# Patient Record
Sex: MALE | Race: WHITE | NOT HISPANIC OR LATINO | URBAN - METROPOLITAN AREA
[De-identification: names, ages, dates, MRNs, and addresses within clinical notes are randomized per-mention and may not be internally consistent; named-entity substitution may affect disease eponyms.]

---

## 2017-03-03 ENCOUNTER — OFFICE VISIT - HEALTHEAST (OUTPATIENT)
Dept: FAMILY MEDICINE | Facility: CLINIC | Age: 62
End: 2017-03-03

## 2017-03-03 ENCOUNTER — COMMUNICATION - HEALTHEAST (OUTPATIENT)
Dept: FAMILY MEDICINE | Facility: CLINIC | Age: 62
End: 2017-03-03

## 2017-03-03 DIAGNOSIS — I10 ESSENTIAL HYPERTENSION WITH GOAL BLOOD PRESSURE LESS THAN 130/80: ICD-10-CM

## 2017-03-03 DIAGNOSIS — L91.8 SKIN TAG: ICD-10-CM

## 2017-03-03 DIAGNOSIS — E78.5 HYPERLIPIDEMIA, UNSPECIFIED HYPERLIPIDEMIA TYPE: ICD-10-CM

## 2017-03-03 DIAGNOSIS — E11.9 TYPE 2 DIABETES MELLITUS (H): ICD-10-CM

## 2017-03-03 LAB
CHOLEST SERPL-MCNC: 128 MG/DL
FASTING STATUS PATIENT QL REPORTED: ABNORMAL
HBA1C MFR BLD: 7.7 % (ref 3.5–6)
HDLC SERPL-MCNC: 34 MG/DL
LDLC SERPL CALC-MCNC: 66 MG/DL
TRIGL SERPL-MCNC: 138 MG/DL

## 2017-03-11 ENCOUNTER — RECORDS - HEALTHEAST (OUTPATIENT)
Dept: ADMINISTRATIVE | Facility: OTHER | Age: 62
End: 2017-03-11

## 2017-04-09 ENCOUNTER — COMMUNICATION - HEALTHEAST (OUTPATIENT)
Dept: FAMILY MEDICINE | Facility: CLINIC | Age: 62
End: 2017-04-09

## 2017-04-09 DIAGNOSIS — I10 ESSENTIAL HYPERTENSION: ICD-10-CM

## 2017-04-09 DIAGNOSIS — E78.5 HYPERLIPIDEMIA: ICD-10-CM

## 2017-07-03 ENCOUNTER — OFFICE VISIT - HEALTHEAST (OUTPATIENT)
Dept: FAMILY MEDICINE | Facility: CLINIC | Age: 62
End: 2017-07-03

## 2017-07-03 DIAGNOSIS — H91.90 DECREASED HEARING: ICD-10-CM

## 2017-07-03 DIAGNOSIS — Z02.89 ENCOUNTER FOR EXAMINATION REQUIRED BY DEPARTMENT OF TRANSPORTATION (DOT): ICD-10-CM

## 2017-07-03 ASSESSMENT — MIFFLIN-ST. JEOR: SCORE: 1750.99

## 2017-07-05 ENCOUNTER — COMMUNICATION - HEALTHEAST (OUTPATIENT)
Dept: FAMILY MEDICINE | Facility: CLINIC | Age: 62
End: 2017-07-05

## 2017-07-05 ENCOUNTER — OFFICE VISIT - HEALTHEAST (OUTPATIENT)
Dept: FAMILY MEDICINE | Facility: CLINIC | Age: 62
End: 2017-07-05

## 2017-07-05 DIAGNOSIS — I10 ESSENTIAL HYPERTENSION WITH GOAL BLOOD PRESSURE LESS THAN 130/80: ICD-10-CM

## 2017-07-05 DIAGNOSIS — E11.9 TYPE 2 DIABETES MELLITUS (H): ICD-10-CM

## 2017-07-05 DIAGNOSIS — E78.5 HYPERLIPIDEMIA, UNSPECIFIED HYPERLIPIDEMIA TYPE: ICD-10-CM

## 2017-07-05 DIAGNOSIS — Z00.00 ROUTINE GENERAL MEDICAL EXAMINATION AT A HEALTH CARE FACILITY: ICD-10-CM

## 2017-07-05 DIAGNOSIS — D12.6 TUBULAR ADENOMA OF COLON: ICD-10-CM

## 2017-07-05 DIAGNOSIS — E11.319 DIABETIC RETINOPATHY (H): ICD-10-CM

## 2017-07-05 DIAGNOSIS — E66.09 NON MORBID OBESITY DUE TO EXCESS CALORIES: ICD-10-CM

## 2017-07-05 LAB — HBA1C MFR BLD: 7.3 % (ref 3.5–6)

## 2017-07-05 ASSESSMENT — MIFFLIN-ST. JEOR: SCORE: 1752.41

## 2017-10-04 ENCOUNTER — OFFICE VISIT - HEALTHEAST (OUTPATIENT)
Dept: AUDIOLOGY | Facility: CLINIC | Age: 62
End: 2017-10-04

## 2017-10-04 DIAGNOSIS — H93.11 TINNITUS OF RIGHT EAR: ICD-10-CM

## 2017-10-04 DIAGNOSIS — H90.3 SENSORINEURAL HEARING LOSS, ASYMMETRICAL: ICD-10-CM

## 2017-11-07 ENCOUNTER — OFFICE VISIT - HEALTHEAST (OUTPATIENT)
Dept: FAMILY MEDICINE | Facility: CLINIC | Age: 62
End: 2017-11-07

## 2017-11-07 ENCOUNTER — COMMUNICATION - HEALTHEAST (OUTPATIENT)
Dept: FAMILY MEDICINE | Facility: CLINIC | Age: 62
End: 2017-11-07

## 2017-11-07 DIAGNOSIS — H26.9 CATARACTS, BILATERAL: ICD-10-CM

## 2017-11-07 DIAGNOSIS — E11.9 TYPE 2 DIABETES MELLITUS (H): ICD-10-CM

## 2017-11-07 DIAGNOSIS — I10 ESSENTIAL HYPERTENSION: ICD-10-CM

## 2017-11-07 DIAGNOSIS — E11.36 TYPE 2 DIABETES MELLITUS WITH DIABETIC CATARACT, WITHOUT LONG-TERM CURRENT USE OF INSULIN (H): ICD-10-CM

## 2017-11-07 DIAGNOSIS — E80.6 HYPERBILIRUBINEMIA: ICD-10-CM

## 2017-11-07 DIAGNOSIS — E78.5 HYPERLIPIDEMIA, UNSPECIFIED HYPERLIPIDEMIA TYPE: ICD-10-CM

## 2017-11-07 LAB
CHOLEST SERPL-MCNC: 143 MG/DL
FASTING STATUS PATIENT QL REPORTED: ABNORMAL
HBA1C MFR BLD: 7.5 % (ref 3.5–6)
HDLC SERPL-MCNC: 36 MG/DL
LDLC SERPL CALC-MCNC: 81 MG/DL
TRIGL SERPL-MCNC: 132 MG/DL

## 2017-12-01 ENCOUNTER — COMMUNICATION - HEALTHEAST (OUTPATIENT)
Dept: FAMILY MEDICINE | Facility: CLINIC | Age: 62
End: 2017-12-01

## 2017-12-01 DIAGNOSIS — E11.9 TYPE 2 DIABETES MELLITUS (H): ICD-10-CM

## 2017-12-06 ENCOUNTER — COMMUNICATION - HEALTHEAST (OUTPATIENT)
Dept: UROLOGY | Facility: CLINIC | Age: 62
End: 2017-12-06

## 2017-12-11 ENCOUNTER — AMBULATORY - HEALTHEAST (OUTPATIENT)
Dept: LAB | Facility: HOSPITAL | Age: 62
End: 2017-12-11

## 2017-12-11 ENCOUNTER — COMMUNICATION - HEALTHEAST (OUTPATIENT)
Dept: UROLOGY | Facility: CLINIC | Age: 62
End: 2017-12-11

## 2017-12-11 DIAGNOSIS — N20.9 URINARY TRACT STONES: ICD-10-CM

## 2017-12-13 LAB
1ST CONSTITUENT:: NORMAL
SOURCE: NORMAL

## 2017-12-28 ENCOUNTER — COMMUNICATION - HEALTHEAST (OUTPATIENT)
Dept: UROLOGY | Facility: CLINIC | Age: 62
End: 2017-12-28

## 2018-03-09 ENCOUNTER — OFFICE VISIT - HEALTHEAST (OUTPATIENT)
Dept: FAMILY MEDICINE | Facility: CLINIC | Age: 63
End: 2018-03-09

## 2018-03-09 DIAGNOSIS — I10 ESSENTIAL HYPERTENSION: ICD-10-CM

## 2018-03-09 DIAGNOSIS — E78.5 HYPERLIPIDEMIA, UNSPECIFIED HYPERLIPIDEMIA TYPE: ICD-10-CM

## 2018-03-09 DIAGNOSIS — D12.6 TUBULAR ADENOMA OF COLON: ICD-10-CM

## 2018-03-09 DIAGNOSIS — N20.0 LEFT NEPHROLITHIASIS: ICD-10-CM

## 2018-03-09 DIAGNOSIS — E11.319 DIABETIC RETINOPATHY OF BOTH EYES ASSOCIATED WITH TYPE 2 DIABETES MELLITUS, MACULAR EDEMA PRESENCE UNSPECIFIED, UNSPECIFIED RETINOPATHY SEVERITY (H): ICD-10-CM

## 2018-03-09 DIAGNOSIS — E11.9 TYPE 2 DIABETES MELLITUS (H): ICD-10-CM

## 2018-03-09 DIAGNOSIS — N18.30 CKD (CHRONIC KIDNEY DISEASE) STAGE 3, GFR 30-59 ML/MIN (H): ICD-10-CM

## 2018-03-09 LAB
ANION GAP SERPL CALCULATED.3IONS-SCNC: 9 MMOL/L (ref 5–18)
BUN SERPL-MCNC: 16 MG/DL (ref 8–22)
CALCIUM SERPL-MCNC: 9.6 MG/DL (ref 8.5–10.5)
CHLORIDE BLD-SCNC: 102 MMOL/L (ref 98–107)
CO2 SERPL-SCNC: 30 MMOL/L (ref 22–31)
CREAT SERPL-MCNC: 1.17 MG/DL (ref 0.7–1.3)
CREAT UR-MCNC: 253.8 MG/DL
GFR SERPL CREATININE-BSD FRML MDRD: >60 ML/MIN/1.73M2
GLUCOSE BLD-MCNC: 108 MG/DL (ref 70–125)
HBA1C MFR BLD: 7.8 % (ref 3.5–6)
MICROALBUMIN UR-MCNC: 1.96 MG/DL (ref 0–1.99)
MICROALBUMIN/CREAT UR: 7.7 MG/G
POTASSIUM BLD-SCNC: 4.5 MMOL/L (ref 3.5–5)
SODIUM SERPL-SCNC: 141 MMOL/L (ref 136–145)

## 2018-03-10 ENCOUNTER — COMMUNICATION - HEALTHEAST (OUTPATIENT)
Dept: FAMILY MEDICINE | Facility: CLINIC | Age: 63
End: 2018-03-10

## 2018-03-24 ENCOUNTER — RECORDS - HEALTHEAST (OUTPATIENT)
Dept: ADMINISTRATIVE | Facility: OTHER | Age: 63
End: 2018-03-24

## 2018-04-29 ENCOUNTER — COMMUNICATION - HEALTHEAST (OUTPATIENT)
Dept: FAMILY MEDICINE | Facility: CLINIC | Age: 63
End: 2018-04-29

## 2018-04-29 DIAGNOSIS — E78.5 HYPERLIPIDEMIA: ICD-10-CM

## 2018-04-29 DIAGNOSIS — I10 ESSENTIAL HYPERTENSION: ICD-10-CM

## 2018-06-18 ENCOUNTER — OFFICE VISIT - HEALTHEAST (OUTPATIENT)
Dept: FAMILY MEDICINE | Facility: CLINIC | Age: 63
End: 2018-06-18

## 2018-06-18 DIAGNOSIS — Z02.89 ENCOUNTER FOR EXAMINATION REQUIRED BY DEPARTMENT OF TRANSPORTATION (DOT): ICD-10-CM

## 2018-06-18 DIAGNOSIS — E11.9 DIABETES MELLITUS, TYPE 2 (H): ICD-10-CM

## 2018-06-18 DIAGNOSIS — E78.5 HYPERLIPIDEMIA, UNSPECIFIED HYPERLIPIDEMIA TYPE: ICD-10-CM

## 2018-06-18 DIAGNOSIS — I10 ESSENTIAL HYPERTENSION: ICD-10-CM

## 2018-06-18 DIAGNOSIS — E11.319 DIABETIC RETINOPATHY OF BOTH EYES ASSOCIATED WITH TYPE 2 DIABETES MELLITUS, MACULAR EDEMA PRESENCE UNSPECIFIED, UNSPECIFIED RETINOPATHY SEVERITY (H): ICD-10-CM

## 2018-06-18 DIAGNOSIS — H90.3 ASYMMETRICAL SENSORINEURAL HEARING LOSS: ICD-10-CM

## 2018-06-18 LAB
ALBUMIN SERPL-MCNC: 3.9 G/DL (ref 3.5–5)
ALBUMIN UR-MCNC: ABNORMAL MG/DL
ALP SERPL-CCNC: 74 U/L (ref 45–120)
ALT SERPL W P-5'-P-CCNC: 35 U/L (ref 0–45)
ANION GAP SERPL CALCULATED.3IONS-SCNC: 12 MMOL/L (ref 5–18)
APPEARANCE UR: CLEAR
AST SERPL W P-5'-P-CCNC: 17 U/L (ref 0–40)
BACTERIA #/AREA URNS HPF: ABNORMAL HPF
BILIRUB SERPL-MCNC: 1.4 MG/DL (ref 0–1)
BILIRUB UR QL STRIP: NEGATIVE
BUN SERPL-MCNC: 23 MG/DL (ref 8–22)
CALCIUM SERPL-MCNC: 9.7 MG/DL (ref 8.5–10.5)
CHLORIDE BLD-SCNC: 103 MMOL/L (ref 98–107)
CHOLEST SERPL-MCNC: 125 MG/DL
CO2 SERPL-SCNC: 27 MMOL/L (ref 22–31)
COLOR UR AUTO: YELLOW
CREAT SERPL-MCNC: 1.37 MG/DL (ref 0.7–1.3)
FASTING STATUS PATIENT QL REPORTED: YES
GFR SERPL CREATININE-BSD FRML MDRD: 52 ML/MIN/1.73M2
GLUCOSE BLD-MCNC: 100 MG/DL (ref 70–125)
GLUCOSE UR STRIP-MCNC: NEGATIVE MG/DL
HBA1C MFR BLD: 7.5 % (ref 3.5–6)
HDLC SERPL-MCNC: 33 MG/DL
HGB UR QL STRIP: NEGATIVE
KETONES UR STRIP-MCNC: ABNORMAL MG/DL
LDLC SERPL CALC-MCNC: 67 MG/DL
LEUKOCYTE ESTERASE UR QL STRIP: NEGATIVE
NITRATE UR QL: NEGATIVE
PH UR STRIP: 5 [PH] (ref 5–8)
POTASSIUM BLD-SCNC: 4.7 MMOL/L (ref 3.5–5)
PROT SERPL-MCNC: 6.7 G/DL (ref 6–8)
RBC #/AREA URNS AUTO: ABNORMAL HPF
SODIUM SERPL-SCNC: 142 MMOL/L (ref 136–145)
SP GR UR STRIP: >=1.03 (ref 1–1.03)
SQUAMOUS #/AREA URNS AUTO: ABNORMAL LPF
TRIGL SERPL-MCNC: 124 MG/DL
UROBILINOGEN UR STRIP-ACNC: ABNORMAL
WBC #/AREA URNS AUTO: ABNORMAL HPF

## 2018-06-18 ASSESSMENT — MIFFLIN-ST. JEOR: SCORE: 1733.42

## 2018-06-20 ENCOUNTER — OFFICE VISIT - HEALTHEAST (OUTPATIENT)
Dept: FAMILY MEDICINE | Facility: CLINIC | Age: 63
End: 2018-06-20

## 2018-06-20 DIAGNOSIS — H90.3 ASYMMETRICAL SENSORINEURAL HEARING LOSS: ICD-10-CM

## 2018-06-20 DIAGNOSIS — E11.22 TYPE 2 DIABETES MELLITUS WITH STAGE 3 CHRONIC KIDNEY DISEASE, WITHOUT LONG-TERM CURRENT USE OF INSULIN (H): ICD-10-CM

## 2018-06-20 DIAGNOSIS — D12.6 TUBULAR ADENOMA OF COLON: ICD-10-CM

## 2018-06-20 DIAGNOSIS — I10 ESSENTIAL HYPERTENSION: ICD-10-CM

## 2018-06-20 DIAGNOSIS — N20.0 LEFT NEPHROLITHIASIS: ICD-10-CM

## 2018-06-20 DIAGNOSIS — N18.30 TYPE 2 DIABETES MELLITUS WITH STAGE 3 CHRONIC KIDNEY DISEASE, WITHOUT LONG-TERM CURRENT USE OF INSULIN (H): ICD-10-CM

## 2018-06-20 DIAGNOSIS — E78.5 HYPERLIPIDEMIA, UNSPECIFIED HYPERLIPIDEMIA TYPE: ICD-10-CM

## 2018-06-20 DIAGNOSIS — N18.30 CKD (CHRONIC KIDNEY DISEASE) STAGE 3, GFR 30-59 ML/MIN (H): ICD-10-CM

## 2018-08-10 ENCOUNTER — COMMUNICATION - HEALTHEAST (OUTPATIENT)
Dept: FAMILY MEDICINE | Facility: CLINIC | Age: 63
End: 2018-08-10

## 2018-08-10 DIAGNOSIS — E11.36 TYPE 2 DIABETES MELLITUS WITH DIABETIC CATARACT, WITHOUT LONG-TERM CURRENT USE OF INSULIN (H): ICD-10-CM

## 2018-08-15 ENCOUNTER — OFFICE VISIT - HEALTHEAST (OUTPATIENT)
Dept: OTOLARYNGOLOGY | Facility: CLINIC | Age: 63
End: 2018-08-15

## 2018-08-15 ENCOUNTER — OFFICE VISIT - HEALTHEAST (OUTPATIENT)
Dept: AUDIOLOGY | Facility: CLINIC | Age: 63
End: 2018-08-15

## 2018-08-15 DIAGNOSIS — H90.3 SENSORINEURAL HEARING LOSS, ASYMMETRICAL: ICD-10-CM

## 2018-08-15 DIAGNOSIS — H93.11 TINNITUS OF RIGHT EAR: ICD-10-CM

## 2018-08-15 DIAGNOSIS — H90.3 ASYMMETRICAL SENSORINEURAL HEARING LOSS: ICD-10-CM

## 2018-10-24 ENCOUNTER — OFFICE VISIT - HEALTHEAST (OUTPATIENT)
Dept: FAMILY MEDICINE | Facility: CLINIC | Age: 63
End: 2018-10-24

## 2018-10-24 ENCOUNTER — AMBULATORY - HEALTHEAST (OUTPATIENT)
Dept: FAMILY MEDICINE | Facility: CLINIC | Age: 63
End: 2018-10-24

## 2018-10-24 DIAGNOSIS — E66.811 CLASS 1 OBESITY DUE TO EXCESS CALORIES WITH SERIOUS COMORBIDITY AND BODY MASS INDEX (BMI) OF 33.0 TO 33.9 IN ADULT: ICD-10-CM

## 2018-10-24 DIAGNOSIS — E11.9 DIABETES MELLITUS, TYPE 2 (H): ICD-10-CM

## 2018-10-24 DIAGNOSIS — I10 ESSENTIAL HYPERTENSION: ICD-10-CM

## 2018-10-24 DIAGNOSIS — E66.09 CLASS 1 OBESITY DUE TO EXCESS CALORIES WITH SERIOUS COMORBIDITY AND BODY MASS INDEX (BMI) OF 33.0 TO 33.9 IN ADULT: ICD-10-CM

## 2018-10-24 DIAGNOSIS — L91.8 CUTANEOUS SKIN TAGS: ICD-10-CM

## 2018-10-24 DIAGNOSIS — R06.83 SNORING: ICD-10-CM

## 2018-10-24 DIAGNOSIS — Z00.00 ROUTINE GENERAL MEDICAL EXAMINATION AT A HEALTH CARE FACILITY: ICD-10-CM

## 2018-10-24 DIAGNOSIS — N18.30 CKD (CHRONIC KIDNEY DISEASE) STAGE 3, GFR 30-59 ML/MIN (H): ICD-10-CM

## 2018-10-24 DIAGNOSIS — E78.5 HYPERLIPIDEMIA, UNSPECIFIED HYPERLIPIDEMIA TYPE: ICD-10-CM

## 2018-10-24 DIAGNOSIS — D12.6 TUBULAR ADENOMA OF COLON: ICD-10-CM

## 2018-10-24 LAB
ALBUMIN SERPL-MCNC: 3.8 G/DL (ref 3.5–5)
ALP SERPL-CCNC: 73 U/L (ref 45–120)
ALT SERPL W P-5'-P-CCNC: 42 U/L (ref 0–45)
ANION GAP SERPL CALCULATED.3IONS-SCNC: 14 MMOL/L (ref 5–18)
AST SERPL W P-5'-P-CCNC: 21 U/L (ref 0–40)
BILIRUB SERPL-MCNC: 1.2 MG/DL (ref 0–1)
BUN SERPL-MCNC: 18 MG/DL (ref 8–22)
CALCIUM SERPL-MCNC: 10 MG/DL (ref 8.5–10.5)
CHLORIDE BLD-SCNC: 104 MMOL/L (ref 98–107)
CO2 SERPL-SCNC: 24 MMOL/L (ref 22–31)
CREAT SERPL-MCNC: 1.19 MG/DL (ref 0.7–1.3)
GFR SERPL CREATININE-BSD FRML MDRD: >60 ML/MIN/1.73M2
GLUCOSE BLD-MCNC: 134 MG/DL (ref 70–125)
HBA1C MFR BLD: 7.8 % (ref 3.5–6)
POTASSIUM BLD-SCNC: 4.9 MMOL/L (ref 3.5–5)
PROT SERPL-MCNC: 6.6 G/DL (ref 6–8)
PSA SERPL-MCNC: 2.4 NG/ML (ref 0–4.5)
SODIUM SERPL-SCNC: 142 MMOL/L (ref 136–145)

## 2018-10-24 ASSESSMENT — MIFFLIN-ST. JEOR: SCORE: 1744.47

## 2018-10-25 ENCOUNTER — AMBULATORY - HEALTHEAST (OUTPATIENT)
Dept: FAMILY MEDICINE | Facility: CLINIC | Age: 63
End: 2018-10-25

## 2018-10-25 ENCOUNTER — COMMUNICATION - HEALTHEAST (OUTPATIENT)
Dept: FAMILY MEDICINE | Facility: CLINIC | Age: 63
End: 2018-10-25

## 2018-10-25 DIAGNOSIS — E55.9 VITAMIN D DEFICIENCY: ICD-10-CM

## 2018-10-25 LAB — 25(OH)D3 SERPL-MCNC: 13.8 NG/ML (ref 30–80)

## 2019-02-26 ENCOUNTER — OFFICE VISIT - HEALTHEAST (OUTPATIENT)
Dept: FAMILY MEDICINE | Facility: CLINIC | Age: 64
End: 2019-02-26

## 2019-02-26 DIAGNOSIS — D12.6 TUBULAR ADENOMA OF COLON: ICD-10-CM

## 2019-02-26 DIAGNOSIS — E66.811 CLASS 1 OBESITY DUE TO EXCESS CALORIES WITH SERIOUS COMORBIDITY AND BODY MASS INDEX (BMI) OF 33.0 TO 33.9 IN ADULT: ICD-10-CM

## 2019-02-26 DIAGNOSIS — N18.30 CKD (CHRONIC KIDNEY DISEASE) STAGE 3, GFR 30-59 ML/MIN (H): ICD-10-CM

## 2019-02-26 DIAGNOSIS — E11.36 TYPE 2 DIABETES MELLITUS WITH DIABETIC CATARACT, WITHOUT LONG-TERM CURRENT USE OF INSULIN (H): ICD-10-CM

## 2019-02-26 DIAGNOSIS — E78.5 HYPERLIPIDEMIA, UNSPECIFIED HYPERLIPIDEMIA TYPE: ICD-10-CM

## 2019-02-26 DIAGNOSIS — E55.9 VITAMIN D DEFICIENCY: ICD-10-CM

## 2019-02-26 DIAGNOSIS — E66.09 CLASS 1 OBESITY DUE TO EXCESS CALORIES WITH SERIOUS COMORBIDITY AND BODY MASS INDEX (BMI) OF 33.0 TO 33.9 IN ADULT: ICD-10-CM

## 2019-02-26 DIAGNOSIS — I10 ESSENTIAL HYPERTENSION: ICD-10-CM

## 2019-02-26 LAB
ANION GAP SERPL CALCULATED.3IONS-SCNC: 13 MMOL/L (ref 5–18)
BUN SERPL-MCNC: 20 MG/DL (ref 8–22)
CALCIUM SERPL-MCNC: 9.6 MG/DL (ref 8.5–10.5)
CHLORIDE BLD-SCNC: 103 MMOL/L (ref 98–107)
CO2 SERPL-SCNC: 25 MMOL/L (ref 22–31)
CREAT SERPL-MCNC: 1.3 MG/DL (ref 0.7–1.3)
GFR SERPL CREATININE-BSD FRML MDRD: 56 ML/MIN/1.73M2
GLUCOSE BLD-MCNC: 197 MG/DL (ref 70–125)
HBA1C MFR BLD: 7.1 % (ref 3.5–6)
POTASSIUM BLD-SCNC: 4.5 MMOL/L (ref 3.5–5)
SODIUM SERPL-SCNC: 141 MMOL/L (ref 136–145)

## 2019-02-27 ENCOUNTER — COMMUNICATION - HEALTHEAST (OUTPATIENT)
Dept: FAMILY MEDICINE | Facility: CLINIC | Age: 64
End: 2019-02-27

## 2019-02-27 LAB — 25(OH)D3 SERPL-MCNC: 54.2 NG/ML (ref 30–80)

## 2019-04-06 ENCOUNTER — RECORDS - HEALTHEAST (OUTPATIENT)
Dept: ADMINISTRATIVE | Facility: OTHER | Age: 64
End: 2019-04-06

## 2019-04-17 ENCOUNTER — RECORDS - HEALTHEAST (OUTPATIENT)
Dept: HEALTH INFORMATION MANAGEMENT | Facility: CLINIC | Age: 64
End: 2019-04-17

## 2019-04-19 ENCOUNTER — COMMUNICATION - HEALTHEAST (OUTPATIENT)
Dept: FAMILY MEDICINE | Facility: CLINIC | Age: 64
End: 2019-04-19

## 2019-04-19 DIAGNOSIS — I10 ESSENTIAL HYPERTENSION: ICD-10-CM

## 2019-04-19 DIAGNOSIS — E78.5 HYPERLIPIDEMIA: ICD-10-CM

## 2019-06-14 ENCOUNTER — OFFICE VISIT - HEALTHEAST (OUTPATIENT)
Dept: FAMILY MEDICINE | Facility: CLINIC | Age: 64
End: 2019-06-14

## 2019-06-14 DIAGNOSIS — Z12.11 SCREEN FOR COLON CANCER: ICD-10-CM

## 2019-06-14 DIAGNOSIS — Z02.89 ENCOUNTER FOR EXAMINATION REQUIRED BY DEPARTMENT OF TRANSPORTATION (DOT): ICD-10-CM

## 2019-06-14 LAB
ALBUMIN UR-MCNC: ABNORMAL MG/DL
APPEARANCE UR: CLEAR
BACTERIA #/AREA URNS HPF: ABNORMAL HPF
BILIRUB UR QL STRIP: NEGATIVE
COLOR UR AUTO: YELLOW
GLUCOSE UR STRIP-MCNC: NEGATIVE MG/DL
HGB UR QL STRIP: ABNORMAL
KETONES UR STRIP-MCNC: NEGATIVE MG/DL
LEUKOCYTE ESTERASE UR QL STRIP: NEGATIVE
MUCOUS THREADS #/AREA URNS LPF: ABNORMAL LPF
NITRATE UR QL: NEGATIVE
PH UR STRIP: 5 [PH] (ref 5–8)
RBC #/AREA URNS AUTO: ABNORMAL HPF
SP GR UR STRIP: >=1.03 (ref 1–1.03)
SQUAMOUS #/AREA URNS AUTO: ABNORMAL LPF
UROBILINOGEN UR STRIP-ACNC: ABNORMAL
WBC #/AREA URNS AUTO: ABNORMAL HPF

## 2019-06-14 ASSESSMENT — MIFFLIN-ST. JEOR: SCORE: 1741.35

## 2019-07-10 ENCOUNTER — COMMUNICATION - HEALTHEAST (OUTPATIENT)
Dept: FAMILY MEDICINE | Facility: CLINIC | Age: 64
End: 2019-07-10

## 2019-07-10 ENCOUNTER — OFFICE VISIT - HEALTHEAST (OUTPATIENT)
Dept: FAMILY MEDICINE | Facility: CLINIC | Age: 64
End: 2019-07-10

## 2019-07-10 DIAGNOSIS — N18.30 CKD (CHRONIC KIDNEY DISEASE) STAGE 3, GFR 30-59 ML/MIN (H): ICD-10-CM

## 2019-07-10 DIAGNOSIS — E55.9 VITAMIN D DEFICIENCY: ICD-10-CM

## 2019-07-10 DIAGNOSIS — I10 ESSENTIAL HYPERTENSION: ICD-10-CM

## 2019-07-10 DIAGNOSIS — D12.6 TUBULAR ADENOMA OF COLON: ICD-10-CM

## 2019-07-10 DIAGNOSIS — E78.5 HYPERLIPIDEMIA, UNSPECIFIED HYPERLIPIDEMIA TYPE: ICD-10-CM

## 2019-07-10 DIAGNOSIS — E11.36 TYPE 2 DIABETES MELLITUS WITH DIABETIC CATARACT, WITHOUT LONG-TERM CURRENT USE OF INSULIN (H): ICD-10-CM

## 2019-07-10 LAB
ANION GAP SERPL CALCULATED.3IONS-SCNC: 12 MMOL/L (ref 5–18)
BUN SERPL-MCNC: 26 MG/DL (ref 8–22)
CALCIUM SERPL-MCNC: 10.1 MG/DL (ref 8.5–10.5)
CHLORIDE BLD-SCNC: 102 MMOL/L (ref 98–107)
CHOLEST SERPL-MCNC: 127 MG/DL
CO2 SERPL-SCNC: 26 MMOL/L (ref 22–31)
CREAT SERPL-MCNC: 1.31 MG/DL (ref 0.7–1.3)
CREAT UR-MCNC: 387.6 MG/DL
FASTING STATUS PATIENT QL REPORTED: YES
GFR SERPL CREATININE-BSD FRML MDRD: 55 ML/MIN/1.73M2
GLUCOSE BLD-MCNC: 157 MG/DL (ref 70–125)
HBA1C MFR BLD: 8.2 % (ref 3.5–6)
HDLC SERPL-MCNC: 34 MG/DL
LDLC SERPL CALC-MCNC: 64 MG/DL
MICROALBUMIN UR-MCNC: 3.32 MG/DL (ref 0–1.99)
MICROALBUMIN/CREAT UR: 8.6 MG/G
POTASSIUM BLD-SCNC: 4.7 MMOL/L (ref 3.5–5)
SODIUM SERPL-SCNC: 140 MMOL/L (ref 136–145)
TRIGL SERPL-MCNC: 147 MG/DL

## 2019-07-11 LAB — 25(OH)D3 SERPL-MCNC: 37.1 NG/ML (ref 30–80)

## 2019-08-01 ENCOUNTER — COMMUNICATION - HEALTHEAST (OUTPATIENT)
Dept: FAMILY MEDICINE | Facility: CLINIC | Age: 64
End: 2019-08-01

## 2019-08-01 DIAGNOSIS — E11.36 TYPE 2 DIABETES MELLITUS WITH DIABETIC CATARACT, WITHOUT LONG-TERM CURRENT USE OF INSULIN (H): ICD-10-CM

## 2019-09-26 ENCOUNTER — RECORDS - HEALTHEAST (OUTPATIENT)
Dept: ADMINISTRATIVE | Facility: OTHER | Age: 64
End: 2019-09-26

## 2019-11-14 ENCOUNTER — AMBULATORY - HEALTHEAST (OUTPATIENT)
Dept: FAMILY MEDICINE | Facility: CLINIC | Age: 64
End: 2019-11-14

## 2019-11-14 DIAGNOSIS — E11.319 TYPE 2 DIABETES MELLITUS WITH RETINOPATHY, WITHOUT LONG-TERM CURRENT USE OF INSULIN, MACULAR EDEMA PRESENCE UNSPECIFIED, UNSPECIFIED LATERALITY, UNSPECIFIED RETINOPATHY SEVERITY (H): ICD-10-CM

## 2019-11-15 ENCOUNTER — OFFICE VISIT - HEALTHEAST (OUTPATIENT)
Dept: FAMILY MEDICINE | Facility: CLINIC | Age: 64
End: 2019-11-15

## 2019-11-15 DIAGNOSIS — E55.9 VITAMIN D DEFICIENCY: ICD-10-CM

## 2019-11-15 DIAGNOSIS — E78.5 HYPERLIPIDEMIA, UNSPECIFIED HYPERLIPIDEMIA TYPE: ICD-10-CM

## 2019-11-15 DIAGNOSIS — N18.30 CKD (CHRONIC KIDNEY DISEASE) STAGE 3, GFR 30-59 ML/MIN (H): ICD-10-CM

## 2019-11-15 DIAGNOSIS — Z00.00 ROUTINE GENERAL MEDICAL EXAMINATION AT A HEALTH CARE FACILITY: ICD-10-CM

## 2019-11-15 DIAGNOSIS — E66.811 CLASS 1 OBESITY DUE TO EXCESS CALORIES WITH SERIOUS COMORBIDITY AND BODY MASS INDEX (BMI) OF 33.0 TO 33.9 IN ADULT: ICD-10-CM

## 2019-11-15 DIAGNOSIS — E11.319 TYPE 2 DIABETES MELLITUS WITH RETINOPATHY, WITHOUT LONG-TERM CURRENT USE OF INSULIN, MACULAR EDEMA PRESENCE UNSPECIFIED, UNSPECIFIED LATERALITY, UNSPECIFIED RETINOPATHY SEVERITY (H): ICD-10-CM

## 2019-11-15 DIAGNOSIS — E11.319 DIABETIC RETINOPATHY OF BOTH EYES ASSOCIATED WITH TYPE 2 DIABETES MELLITUS, MACULAR EDEMA PRESENCE UNSPECIFIED, UNSPECIFIED RETINOPATHY SEVERITY (H): ICD-10-CM

## 2019-11-15 DIAGNOSIS — D12.6 TUBULAR ADENOMA OF COLON: ICD-10-CM

## 2019-11-15 DIAGNOSIS — I10 ESSENTIAL HYPERTENSION: ICD-10-CM

## 2019-11-15 DIAGNOSIS — E66.09 CLASS 1 OBESITY DUE TO EXCESS CALORIES WITH SERIOUS COMORBIDITY AND BODY MASS INDEX (BMI) OF 33.0 TO 33.9 IN ADULT: ICD-10-CM

## 2019-11-15 DIAGNOSIS — H43.393 VITREOUS FLOATERS OF BOTH EYES: ICD-10-CM

## 2019-11-15 DIAGNOSIS — H90.3 ASYMMETRICAL SENSORINEURAL HEARING LOSS: ICD-10-CM

## 2019-11-15 LAB
ALBUMIN SERPL-MCNC: 3.8 G/DL (ref 3.5–5)
ALP SERPL-CCNC: 74 U/L (ref 45–120)
ALT SERPL W P-5'-P-CCNC: 32 U/L (ref 0–45)
ANION GAP SERPL CALCULATED.3IONS-SCNC: 12 MMOL/L (ref 5–18)
AST SERPL W P-5'-P-CCNC: 18 U/L (ref 0–40)
BILIRUB SERPL-MCNC: 1.2 MG/DL (ref 0–1)
BUN SERPL-MCNC: 16 MG/DL (ref 8–22)
CALCIUM SERPL-MCNC: 9.6 MG/DL (ref 8.5–10.5)
CHLORIDE BLD-SCNC: 104 MMOL/L (ref 98–107)
CO2 SERPL-SCNC: 26 MMOL/L (ref 22–31)
CREAT SERPL-MCNC: 1.3 MG/DL (ref 0.7–1.3)
GFR SERPL CREATININE-BSD FRML MDRD: 55 ML/MIN/1.73M2
GLUCOSE BLD-MCNC: 120 MG/DL (ref 70–125)
HBA1C MFR BLD: 7.5 % (ref 3.5–6)
HIV 1+2 AB+HIV1 P24 AG SERPL QL IA: NEGATIVE
POTASSIUM BLD-SCNC: 4.7 MMOL/L (ref 3.5–5)
PROT SERPL-MCNC: 6.7 G/DL (ref 6–8)
PSA SERPL-MCNC: 3.2 NG/ML (ref 0–4.5)
SODIUM SERPL-SCNC: 142 MMOL/L (ref 136–145)

## 2019-11-15 ASSESSMENT — MIFFLIN-ST. JEOR: SCORE: 1717.26

## 2019-11-18 ENCOUNTER — COMMUNICATION - HEALTHEAST (OUTPATIENT)
Dept: FAMILY MEDICINE | Facility: CLINIC | Age: 64
End: 2019-11-18

## 2019-11-18 DIAGNOSIS — E11.36 TYPE 2 DIABETES MELLITUS WITH DIABETIC CATARACT, WITHOUT LONG-TERM CURRENT USE OF INSULIN (H): ICD-10-CM

## 2019-11-18 LAB — 25(OH)D3 SERPL-MCNC: 35.5 NG/ML (ref 30–80)

## 2020-03-20 ENCOUNTER — OFFICE VISIT - HEALTHEAST (OUTPATIENT)
Dept: FAMILY MEDICINE | Facility: CLINIC | Age: 65
End: 2020-03-20

## 2020-03-20 DIAGNOSIS — I10 ESSENTIAL HYPERTENSION: ICD-10-CM

## 2020-03-20 DIAGNOSIS — E78.5 HYPERLIPIDEMIA, UNSPECIFIED HYPERLIPIDEMIA TYPE: ICD-10-CM

## 2020-03-20 DIAGNOSIS — E11.319 TYPE 2 DIABETES MELLITUS WITH RETINOPATHY, WITHOUT LONG-TERM CURRENT USE OF INSULIN, MACULAR EDEMA PRESENCE UNSPECIFIED, UNSPECIFIED LATERALITY, UNSPECIFIED RETINOPATHY SEVERITY (H): ICD-10-CM

## 2020-03-20 DIAGNOSIS — D12.6 TUBULAR ADENOMA OF COLON: ICD-10-CM

## 2020-03-20 DIAGNOSIS — E66.811 CLASS 1 OBESITY DUE TO EXCESS CALORIES WITH SERIOUS COMORBIDITY AND BODY MASS INDEX (BMI) OF 33.0 TO 33.9 IN ADULT: ICD-10-CM

## 2020-03-20 DIAGNOSIS — E66.09 CLASS 1 OBESITY DUE TO EXCESS CALORIES WITH SERIOUS COMORBIDITY AND BODY MASS INDEX (BMI) OF 33.0 TO 33.9 IN ADULT: ICD-10-CM

## 2020-04-16 ENCOUNTER — COMMUNICATION - HEALTHEAST (OUTPATIENT)
Dept: FAMILY MEDICINE | Facility: CLINIC | Age: 65
End: 2020-04-16

## 2020-04-16 DIAGNOSIS — E78.5 HYPERLIPIDEMIA, UNSPECIFIED HYPERLIPIDEMIA TYPE: ICD-10-CM

## 2020-04-16 DIAGNOSIS — I10 ESSENTIAL HYPERTENSION WITH GOAL BLOOD PRESSURE LESS THAN 130/80: ICD-10-CM

## 2020-05-26 ENCOUNTER — COMMUNICATION - HEALTHEAST (OUTPATIENT)
Dept: FAMILY MEDICINE | Facility: CLINIC | Age: 65
End: 2020-05-26

## 2020-06-13 ENCOUNTER — RECORDS - HEALTHEAST (OUTPATIENT)
Dept: ADMINISTRATIVE | Facility: OTHER | Age: 65
End: 2020-06-13

## 2020-06-13 LAB — RETINOPATHY: NEGATIVE

## 2020-06-19 ENCOUNTER — RECORDS - HEALTHEAST (OUTPATIENT)
Dept: HEALTH INFORMATION MANAGEMENT | Facility: CLINIC | Age: 65
End: 2020-06-19

## 2020-06-29 ENCOUNTER — OFFICE VISIT - HEALTHEAST (OUTPATIENT)
Dept: FAMILY MEDICINE | Facility: CLINIC | Age: 65
End: 2020-06-29

## 2020-06-29 DIAGNOSIS — I10 ESSENTIAL HYPERTENSION: ICD-10-CM

## 2020-06-29 DIAGNOSIS — Z02.89 ENCOUNTER FOR EXAMINATION REQUIRED BY DEPARTMENT OF TRANSPORTATION (DOT): ICD-10-CM

## 2020-06-29 DIAGNOSIS — H90.3 ASYMMETRICAL SENSORINEURAL HEARING LOSS: ICD-10-CM

## 2020-06-29 DIAGNOSIS — E78.2 MIXED HYPERLIPIDEMIA: ICD-10-CM

## 2020-06-29 DIAGNOSIS — E11.9 TYPE 2 DIABETES MELLITUS WITHOUT COMPLICATION, WITHOUT LONG-TERM CURRENT USE OF INSULIN (H): ICD-10-CM

## 2020-06-29 LAB
ALBUMIN UR-MCNC: ABNORMAL MG/DL
APPEARANCE UR: CLEAR
BACTERIA #/AREA URNS HPF: ABNORMAL HPF
BILIRUB UR QL STRIP: NEGATIVE
COLOR UR AUTO: YELLOW
GLUCOSE UR STRIP-MCNC: NEGATIVE MG/DL
HBA1C MFR BLD: 7.4 % (ref 3.5–6)
HGB UR QL STRIP: NEGATIVE
KETONES UR STRIP-MCNC: NEGATIVE MG/DL
LEUKOCYTE ESTERASE UR QL STRIP: NEGATIVE
MUCOUS THREADS #/AREA URNS LPF: ABNORMAL LPF
NITRATE UR QL: NEGATIVE
PH UR STRIP: 5 [PH] (ref 5–8)
RBC #/AREA URNS AUTO: ABNORMAL HPF
SP GR UR STRIP: >=1.03 (ref 1–1.03)
SQUAMOUS #/AREA URNS AUTO: ABNORMAL LPF
UROBILINOGEN UR STRIP-ACNC: ABNORMAL
WBC #/AREA URNS AUTO: ABNORMAL HPF

## 2020-06-29 ASSESSMENT — MIFFLIN-ST. JEOR: SCORE: 1709.32

## 2020-08-01 ENCOUNTER — COMMUNICATION - HEALTHEAST (OUTPATIENT)
Dept: FAMILY MEDICINE | Facility: CLINIC | Age: 65
End: 2020-08-01

## 2020-08-01 DIAGNOSIS — E11.36 TYPE 2 DIABETES MELLITUS WITH DIABETIC CATARACT, WITHOUT LONG-TERM CURRENT USE OF INSULIN (H): ICD-10-CM

## 2020-08-09 ENCOUNTER — COMMUNICATION - HEALTHEAST (OUTPATIENT)
Dept: FAMILY MEDICINE | Facility: CLINIC | Age: 65
End: 2020-08-09

## 2020-08-09 DIAGNOSIS — E11.36 TYPE 2 DIABETES MELLITUS WITH DIABETIC CATARACT, WITHOUT LONG-TERM CURRENT USE OF INSULIN (H): ICD-10-CM

## 2020-08-21 ENCOUNTER — OFFICE VISIT - HEALTHEAST (OUTPATIENT)
Dept: FAMILY MEDICINE | Facility: CLINIC | Age: 65
End: 2020-08-21

## 2020-08-21 ENCOUNTER — COMMUNICATION - HEALTHEAST (OUTPATIENT)
Dept: FAMILY MEDICINE | Facility: CLINIC | Age: 65
End: 2020-08-21

## 2020-08-21 DIAGNOSIS — E66.811 CLASS 1 OBESITY DUE TO EXCESS CALORIES WITH SERIOUS COMORBIDITY AND BODY MASS INDEX (BMI) OF 33.0 TO 33.9 IN ADULT: ICD-10-CM

## 2020-08-21 DIAGNOSIS — E11.9 TYPE 2 DIABETES MELLITUS WITHOUT COMPLICATION, WITHOUT LONG-TERM CURRENT USE OF INSULIN (H): ICD-10-CM

## 2020-08-21 DIAGNOSIS — E66.09 CLASS 1 OBESITY DUE TO EXCESS CALORIES WITH SERIOUS COMORBIDITY AND BODY MASS INDEX (BMI) OF 33.0 TO 33.9 IN ADULT: ICD-10-CM

## 2020-08-21 DIAGNOSIS — E78.2 MIXED HYPERLIPIDEMIA: ICD-10-CM

## 2020-08-21 DIAGNOSIS — I10 ESSENTIAL HYPERTENSION: ICD-10-CM

## 2020-08-21 LAB
ANION GAP SERPL CALCULATED.3IONS-SCNC: 11 MMOL/L (ref 5–18)
BUN SERPL-MCNC: 20 MG/DL (ref 8–22)
CALCIUM SERPL-MCNC: 9.7 MG/DL (ref 8.5–10.5)
CHLORIDE BLD-SCNC: 103 MMOL/L (ref 98–107)
CHOLEST SERPL-MCNC: 125 MG/DL
CO2 SERPL-SCNC: 27 MMOL/L (ref 22–31)
CREAT SERPL-MCNC: 1.24 MG/DL (ref 0.7–1.3)
CREAT UR-MCNC: 280.7 MG/DL
FASTING STATUS PATIENT QL REPORTED: YES
GFR SERPL CREATININE-BSD FRML MDRD: 59 ML/MIN/1.73M2
GLUCOSE BLD-MCNC: 129 MG/DL (ref 70–125)
HDLC SERPL-MCNC: 35 MG/DL
LDLC SERPL CALC-MCNC: 62 MG/DL
MICROALBUMIN UR-MCNC: 2.38 MG/DL (ref 0–1.99)
MICROALBUMIN/CREAT UR: 8.5 MG/G
POTASSIUM BLD-SCNC: 4.4 MMOL/L (ref 3.5–5)
SODIUM SERPL-SCNC: 141 MMOL/L (ref 136–145)
TRIGL SERPL-MCNC: 140 MG/DL

## 2020-10-14 ENCOUNTER — COMMUNICATION - HEALTHEAST (OUTPATIENT)
Dept: FAMILY MEDICINE | Facility: CLINIC | Age: 65
End: 2020-10-14

## 2020-10-14 DIAGNOSIS — E78.5 HYPERLIPIDEMIA, UNSPECIFIED HYPERLIPIDEMIA TYPE: ICD-10-CM

## 2020-10-14 DIAGNOSIS — I10 ESSENTIAL HYPERTENSION WITH GOAL BLOOD PRESSURE LESS THAN 130/80: ICD-10-CM

## 2020-12-22 ENCOUNTER — OFFICE VISIT - HEALTHEAST (OUTPATIENT)
Dept: FAMILY MEDICINE | Facility: CLINIC | Age: 65
End: 2020-12-22

## 2020-12-22 DIAGNOSIS — E11.319 TYPE 2 DIABETES MELLITUS WITH RETINOPATHY, WITHOUT LONG-TERM CURRENT USE OF INSULIN, MACULAR EDEMA PRESENCE UNSPECIFIED, UNSPECIFIED LATERALITY, UNSPECIFIED RETINOPATHY SEVERITY (H): ICD-10-CM

## 2020-12-22 DIAGNOSIS — H91.93 BILATERAL HEARING LOSS, UNSPECIFIED HEARING LOSS TYPE: ICD-10-CM

## 2020-12-22 DIAGNOSIS — I10 ESSENTIAL HYPERTENSION: ICD-10-CM

## 2020-12-22 DIAGNOSIS — D49.2 ATYPICAL SQUAMOPROLIFERATIVE SKIN LESION: ICD-10-CM

## 2020-12-22 DIAGNOSIS — Z00.01 ENCOUNTER FOR GENERAL ADULT MEDICAL EXAMINATION WITH ABNORMAL FINDINGS: ICD-10-CM

## 2020-12-22 DIAGNOSIS — E66.9 OBESITY, UNSPECIFIED: ICD-10-CM

## 2020-12-22 DIAGNOSIS — N18.31 STAGE 3A CHRONIC KIDNEY DISEASE (H): ICD-10-CM

## 2020-12-22 DIAGNOSIS — E78.2 MIXED HYPERLIPIDEMIA: ICD-10-CM

## 2020-12-22 LAB
ANION GAP SERPL CALCULATED.3IONS-SCNC: 12 MMOL/L (ref 5–18)
BUN SERPL-MCNC: 23 MG/DL (ref 8–22)
CALCIUM SERPL-MCNC: 9.6 MG/DL (ref 8.5–10.5)
CHLORIDE BLD-SCNC: 103 MMOL/L (ref 98–107)
CO2 SERPL-SCNC: 27 MMOL/L (ref 22–31)
CREAT SERPL-MCNC: 1.3 MG/DL (ref 0.7–1.3)
GFR SERPL CREATININE-BSD FRML MDRD: 55 ML/MIN/1.73M2
GLUCOSE BLD-MCNC: 176 MG/DL (ref 70–125)
HBA1C MFR BLD: 7.8 %
POTASSIUM BLD-SCNC: 4.1 MMOL/L (ref 3.5–5)
SODIUM SERPL-SCNC: 142 MMOL/L (ref 136–145)

## 2020-12-22 RX ORDER — ATORVASTATIN CALCIUM 40 MG/1
40 TABLET, FILM COATED ORAL DAILY
Qty: 90 TABLET | Refills: 3 | Status: SHIPPED | OUTPATIENT
Start: 2020-12-22 | End: 2022-01-20

## 2020-12-22 RX ORDER — LISINOPRIL AND HYDROCHLOROTHIAZIDE 12.5; 2 MG/1; MG/1
TABLET ORAL
Qty: 90 TABLET | Refills: 3 | Status: SHIPPED | OUTPATIENT
Start: 2020-12-22 | End: 2022-01-14

## 2020-12-22 RX ORDER — GLIPIZIDE 10 MG/1
10 TABLET, FILM COATED, EXTENDED RELEASE ORAL 2 TIMES DAILY
Qty: 180 TABLET | Refills: 1 | Status: SHIPPED | OUTPATIENT
Start: 2020-12-22 | End: 2021-08-24

## 2020-12-22 ASSESSMENT — MIFFLIN-ST. JEOR: SCORE: 1690.61

## 2020-12-23 ENCOUNTER — COMMUNICATION - HEALTHEAST (OUTPATIENT)
Dept: FAMILY MEDICINE | Facility: CLINIC | Age: 65
End: 2020-12-23

## 2021-01-04 ENCOUNTER — COMMUNICATION - HEALTHEAST (OUTPATIENT)
Dept: FAMILY MEDICINE | Facility: CLINIC | Age: 66
End: 2021-01-04

## 2021-01-04 DIAGNOSIS — E11.319 TYPE 2 DIABETES MELLITUS WITH RETINOPATHY, WITHOUT LONG-TERM CURRENT USE OF INSULIN, MACULAR EDEMA PRESENCE UNSPECIFIED, UNSPECIFIED LATERALITY, UNSPECIFIED RETINOPATHY SEVERITY (H): ICD-10-CM

## 2021-01-04 RX ORDER — BLOOD SUGAR DIAGNOSTIC
1 STRIP MISCELLANEOUS DAILY
Qty: 50 STRIP | Refills: 3 | Status: SHIPPED | OUTPATIENT
Start: 2021-01-04

## 2021-04-21 ENCOUNTER — AMBULATORY - HEALTHEAST (OUTPATIENT)
Dept: FAMILY MEDICINE | Facility: CLINIC | Age: 66
End: 2021-04-21

## 2021-04-21 DIAGNOSIS — E11.319 TYPE 2 DIABETES MELLITUS WITH RETINOPATHY, WITHOUT LONG-TERM CURRENT USE OF INSULIN, MACULAR EDEMA PRESENCE UNSPECIFIED, UNSPECIFIED LATERALITY, UNSPECIFIED RETINOPATHY SEVERITY (H): ICD-10-CM

## 2021-04-22 ENCOUNTER — OFFICE VISIT - HEALTHEAST (OUTPATIENT)
Dept: FAMILY MEDICINE | Facility: CLINIC | Age: 66
End: 2021-04-22

## 2021-04-22 ENCOUNTER — AMBULATORY - HEALTHEAST (OUTPATIENT)
Dept: NURSING | Facility: CLINIC | Age: 66
End: 2021-04-22

## 2021-04-22 ENCOUNTER — COMMUNICATION - HEALTHEAST (OUTPATIENT)
Dept: FAMILY MEDICINE | Facility: CLINIC | Age: 66
End: 2021-04-22

## 2021-04-22 DIAGNOSIS — E78.2 MIXED HYPERLIPIDEMIA: ICD-10-CM

## 2021-04-22 DIAGNOSIS — E11.319 TYPE 2 DIABETES MELLITUS WITH RETINOPATHY, WITHOUT LONG-TERM CURRENT USE OF INSULIN, MACULAR EDEMA PRESENCE UNSPECIFIED, UNSPECIFIED LATERALITY, UNSPECIFIED RETINOPATHY SEVERITY (H): ICD-10-CM

## 2021-04-22 DIAGNOSIS — N18.31 STAGE 3A CHRONIC KIDNEY DISEASE (H): ICD-10-CM

## 2021-04-22 DIAGNOSIS — I10 ESSENTIAL HYPERTENSION: ICD-10-CM

## 2021-04-22 LAB
ALBUMIN SERPL-MCNC: 4 G/DL (ref 3.5–5)
ALP SERPL-CCNC: 78 U/L (ref 45–120)
ALT SERPL W P-5'-P-CCNC: 36 U/L (ref 0–45)
ANION GAP SERPL CALCULATED.3IONS-SCNC: 12 MMOL/L (ref 5–18)
AST SERPL W P-5'-P-CCNC: 18 U/L (ref 0–40)
BILIRUB SERPL-MCNC: 1.3 MG/DL (ref 0–1)
BUN SERPL-MCNC: 22 MG/DL (ref 8–22)
CALCIUM SERPL-MCNC: 9.3 MG/DL (ref 8.5–10.5)
CHLORIDE BLD-SCNC: 101 MMOL/L (ref 98–107)
CHOLEST SERPL-MCNC: 121 MG/DL
CO2 SERPL-SCNC: 26 MMOL/L (ref 22–31)
CREAT SERPL-MCNC: 1.4 MG/DL (ref 0.7–1.3)
FASTING STATUS PATIENT QL REPORTED: YES
GFR SERPL CREATININE-BSD FRML MDRD: 51 ML/MIN/1.73M2
GLUCOSE BLD-MCNC: 180 MG/DL (ref 70–125)
HBA1C MFR BLD: 7.7 %
HDLC SERPL-MCNC: 33 MG/DL
LDLC SERPL CALC-MCNC: 56 MG/DL
POTASSIUM BLD-SCNC: 4.5 MMOL/L (ref 3.5–5)
PROT SERPL-MCNC: 6.6 G/DL (ref 6–8)
SODIUM SERPL-SCNC: 139 MMOL/L (ref 136–145)
TRIGL SERPL-MCNC: 160 MG/DL

## 2021-05-13 ENCOUNTER — AMBULATORY - HEALTHEAST (OUTPATIENT)
Dept: NURSING | Facility: CLINIC | Age: 66
End: 2021-05-13

## 2021-05-28 NOTE — TELEPHONE ENCOUNTER
Refill Approved    Rx renewed per Medication Renewal Policy. Medication was last renewed on 3/3/17.    Ann Edwards, [facility] Triage/Med Refill 4/22/2019     Requested Prescriptions   Pending Prescriptions Disp Refills     lisinopril-hydrochlorothiazide (PRINZIDE,ZESTORETIC) 20-12.5 mg per tablet [Pharmacy Med Name: LISINOPRIL-HCTZ 20-12.5 MG TAB] 90 tablet 3     Sig: TAKE 1 TABLET BY MOUTH DAILY       Diuretics/Combination Diuretics Refill Protocol  Passed - 4/19/2019  1:18 AM        Passed - Visit with PCP or prescribing provider visit in past 12 months     Last office visit with prescriber/PCP: 2/26/2019 Mark Lin MD OR same dept: 2/26/2019 Mark Lin MD OR same specialty: 2/26/2019 Mark Lin MD  Last physical: 10/24/2018 Last MTM visit: Visit date not found   Next visit within 3 mo: Visit date not found  Next physical within 3 mo: Visit date not found  Prescriber OR PCP: Mark Lin MD  Last diagnosis associated with med order: 1. Essential hypertension  - lisinopril-hydrochlorothiazide (PRINZIDE,ZESTORETIC) 20-12.5 mg per tablet [Pharmacy Med Name: LISINOPRIL-HCTZ 20-12.5 MG TAB]; Take 1 tablet by mouth daily.  Dispense: 90 tablet; Refill: 3    2. Hyperlipidemia  - atorvastatin (LIPITOR) 40 MG tablet [Pharmacy Med Name: ATORVASTATIN 40 MG TABLET]; TAKE 1 TABLET BY MOUTH EVERY DAY  Dispense: 90 tablet; Refill: 3    If protocol passes may refill for 12 months if within 3 months of last provider visit (or a total of 15 months).             Passed - Serum Potassium in past 12 months      Lab Results   Component Value Date    Potassium 4.5 02/26/2019             Passed - Serum Sodium in past 12 months      Lab Results   Component Value Date    Sodium 141 02/26/2019             Passed - Blood pressure on file in past 12 months     BP Readings from Last 1 Encounters:   02/26/19 110/60             Passed - Serum Creatinine in past 12 months      Creatinine   Date Value Ref Range  Status   02/26/2019 1.30 0.70 - 1.30 mg/dL Final             atorvastatin (LIPITOR) 40 MG tablet [Pharmacy Med Name: ATORVASTATIN 40 MG TABLET] 90 tablet 3     Sig: TAKE 1 TABLET BY MOUTH EVERY DAY       Statins Refill Protocol (Hmg CoA Reductase Inhibitors) Passed - 4/19/2019  1:18 AM        Passed - PCP or prescribing provider visit in past 12 months      Last office visit with prescriber/PCP: 2/26/2019 Mark Lin MD OR same dept: 2/26/2019 Mark Lin MD OR same specialty: 2/26/2019 Mark Lin MD  Last physical: 10/24/2018 Last MTM visit: Visit date not found   Next visit within 3 mo: Visit date not found  Next physical within 3 mo: Visit date not found  Prescriber OR PCP: Mark Lin MD  Last diagnosis associated with med order: 1. Essential hypertension  - lisinopril-hydrochlorothiazide (PRINZIDE,ZESTORETIC) 20-12.5 mg per tablet [Pharmacy Med Name: LISINOPRIL-HCTZ 20-12.5 MG TAB]; Take 1 tablet by mouth daily.  Dispense: 90 tablet; Refill: 3    2. Hyperlipidemia  - atorvastatin (LIPITOR) 40 MG tablet [Pharmacy Med Name: ATORVASTATIN 40 MG TABLET]; TAKE 1 TABLET BY MOUTH EVERY DAY  Dispense: 90 tablet; Refill: 3    If protocol passes may refill for 12 months if within 3 months of last provider visit (or a total of 15 months).

## 2021-05-29 NOTE — PROGRESS NOTES
Medical Examination      Assessment:      Healthy male exam.   Meets standards, but periodic monitoring required due to Type 2 diabetes and hypertension.   qualified only for 1 year.      Plan:        Medical examiners certificate completed and printed.  Return as needed.         Subjective:      Clif Christie is a 63 y.o. male who presents today for a  fitness determination physical exam. The patient reports no problems.  He has a class D license.  Drives a  truck.  Followed by primary care provider for management of hypertension, type 2 diabetes and hyperlipidemia.  Overall feels well.  Reviewed medical history.  Denies concerns with dizziness and lightheadedness.  No chest pain or dyspnea.  No lower extremity edema.  Reports good control of blood sugars.  No episodes of hypoglycemia.  No adverse side effects associated with his medications.  He has chronic tinnitus and decreased hearing in right ear.  Has history of prior work injury about 30 years ago in which he lost the tip of his right thumb.  He has yearly eye exams with his ophthalmologist.    He does have early cataracts for which monitoring but no treatment is required.  He has asymmetrical hearing loss.  He was felt to be candidate for hearing aids.  He notices a little bit of tinnitus in the right ear but otherwise feels that his hearing is adequate and does not wish to pursue hearing aids at this time.  He is contemplating retiring next year.  He is otherwise feeling well.  No other questions or concerns today     The patient reports no problems.  The following portions of the patient's history were reviewed and updated as appropriate: allergies, current medications, past family history, past medical history, past social history, past surgical history and problem list.  Review of Systems  Pertinent items are noted in HPI.     Objective:      Vision:   Uncorrected Corrected Horizontal Field of Vision  "  Right Eye 20/23-1  >90 degrees   Left Eye  20/32-1  >90 degrees   Both Eyes  20/32       Applicant can recognize and distinguish among traffic control signals and devices showing standard red, green, and gilson colors.         Monocular Vision?: No      Hearing:    Able to hear forced whisper at greater than or equal to 5 feet in both ears       /70 (Patient Site: Right Arm, Patient Position: Sitting, Cuff Size: Adult Large)   Pulse 65   Temp 98.1  F (36.7  C) (Oral)   Ht 5' 7.5\" (1.715 m)   Wt 219 lb 5 oz (99.5 kg)   SpO2 97%   BMI 33.84 kg/m    General appearance: alert, appears stated age and cooperative  Head: Normocephalic, without obvious abnormality, atraumatic  Eyes: conjunctivae/corneas clear. PERRL, EOM's intact. Fundi benign.  Ears: normal TM's and external ear canals both ears  Nose: Nares normal. Septum midline. Mucosa normal. No drainage or sinus tenderness.  Throat: lips, mucosa, and tongue normal; teeth and gums normal  Neck: no adenopathy, no carotid bruit and thyroid not enlarged, symmetric, no tenderness/mass/nodules  Back: symmetric, no curvature. ROM normal. No CVA tenderness.  Lungs: clear to auscultation bilaterally  Chest wall: no tenderness  Heart: regular rate and rhythm, S1, S2 normal, no murmur, click, rub or gallop  Abdomen: soft, non-tender; bowel sounds normal; no masses,  no organomegaly  Extremities: extremities normal, atraumatic, no cyanosis or edema  Pulses: 2+ and symmetric  Skin: Skin color, texture, turgor normal. No rashes or lesions  Neurologic: Grossly normal    Labs:       Lab Results   Component Value Date    COLORU Yellow 06/14/2019    CLARITYU Clear 06/14/2019    GLUCOSEU Negative 06/14/2019    BILIRUBINUR Negative 06/14/2019    KETONESU Negative 06/14/2019    SPECGRAV >=1.030 06/14/2019    HGBUA Trace (!) 06/14/2019    PHUR 5.0 06/14/2019    PROTEINUA 30 mg/dL (!) 06/14/2019    UROBILINOGEN 0.2 E.U./dL 06/14/2019    NITRITE Negative 06/14/2019    " LEUKOCYTESUR Negative 06/14/2019    BACTERIA Few (!) 06/14/2019    RBCUA 0-2 06/14/2019    WBCUA 0-5 06/14/2019    SQUAMEPI 0-5 06/14/2019

## 2021-05-30 VITALS — WEIGHT: 221 LBS | BODY MASS INDEX: 34.61 KG/M2

## 2021-05-30 NOTE — PROGRESS NOTES
Assessment/Plan:    1. Type 2 diabetes mellitus with diabetic cataract, without long-term current use of insulin (H)  Type 2 diabetes with noted worsening.  A1c increasing from 7.1% up to 8.2%.  Patient uncertain reason for this however has not changed significantly level of activity or weight.  Weight goal remains less than 210 pounds initially 200 pounds ideally.  Microalbumin screen obtained today.  Annual diabetic eye exams to continue.  Monofilament testing of bilateral feet normal today with appropriate circulation.  Reassess at [place] to Medicare physical in 4 months.  - Glycosylated Hemoglobin A1c  - Microalbumin, Random Urine  - Basic Metabolic Panel    2. Essential hypertension  Continues lisinopril hydrochlorothiazide 20/12.5 using 1 tablet daily.  - Basic Metabolic Panel    3. Hyperlipidemia, unspecified hyperlipidemia type  Check lipid cascade.  Continues atorvastatin 40 mg daily.  Fasting.  - Lipid Cascade    4. CKD (chronic kidney disease) stage 3, GFR 30-59 ml/min (H)  CKD stage III with prior creatinine 1.30 GFR 56.  Ensure stable renal function.    5. Tubular adenoma of colon  Tubular adenoma on colonoscopy August 31, 2016 told to repeat at 3-year interval and has received information for scheduling.    6. Vitamin D deficiency  Vitamin D deficiency with prior level of 13.8 improved to 54.2.  Now using 2000 units daily of vitamin D.  Recheck vitamin D level.  - Vitamin D, Total (25-Hydroxy)      The following high BMI interventions were performed this visit: encouragement to exercise, weight monitoring, weight loss from baseline weight and lifestyle education regarding diet.  Ensure ongoing efforts to achieve weight goal < 210 pounds initially, < 200 pounds ideally.         Subjective:    Clif Christie is seen today for follow-up evaluation.  Type 2 diabetes.  Prior improvement in A1c from 7.6% down to 7.1%.  Not getting consistent exercise.  Trying to eat more fruits in general.  Using  "glipizide extended release 10 mg twice daily metformin 1000 mg twice daily.  Lisinopril hydrochlorothiazide 20/12.5 daily for hypertension.  Atorvastatin 40 mg daily for lipid management.  Aspirin 81 mg daily without bleeding concerns.  History of CKD stage III.  Tubular adenoma history is to repeat colonoscopy August 2019.  Prior vitamin D deficiency and did complete 50,000 units twice a week x12 weeks now using 2000 units daily.  No recent illness.  No chest pain or palpitations.  No shortness of breath.  Comprehensive review of systems as above otherwise all negative.    S.O. 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     Family History   Problem Relation Age of Onset     Diabetes Mother      Diabetes Father      Diabetes Brother         Past Medical History:   Diagnosis Date     Diabetes mellitus (H)      Hypertension         Social History     Tobacco Use     Smoking status: Light Tobacco Smoker     Types: Cigarettes     Smokeless tobacco: Never Used   Substance Use Topics     Alcohol use: Yes     Comment: rare     Drug use: No        Current Outpatient Medications   Medication Sig Dispense Refill     aspirin 81 MG EC tablet Take 81 mg by mouth daily.       atorvastatin (LIPITOR) 40 MG tablet TAKE ONE TABLET BY MOUTH ONE TIME DAILY 90 tablet 2     blood sugar diagnostic (GLUCOSE BLOOD) Strp Accu-Chek [place] in vitro strips.  Test twice daily.       cholecalciferol, vitamin D3, (VITAMIN D3) 2,000 unit capsule Take 1,000 Units by mouth daily.       glipiZIDE (GLUCOTROL XL) 10 MG 24 hr tablet Take 1 tablet (10 mg total) by mouth 2 (two) times a day. 180 tablet 3     " lisinopril-hydrochlorothiazide (PRINZIDE,ZESTORETIC) 20-12.5 mg per tablet TAKE ONE TABLET BY MOUTH ONE TIME DAILY 90 tablet 2     metFORMIN (GLUCOPHAGE) 1000 MG tablet TAKE ONE TABLET BY MOUTH TWICE A DAY WITH MEALS 180 tablet 2     No current facility-administered medications for this visit.           Objective:    Vitals:    07/10/19 0807   BP: 100/60   Pulse: 92   SpO2: 95%   Weight: 215 lb (97.5 kg)      Body mass index is 33.18 kg/m .    Alert.  No apparent distress.  Chest clear.  Cardiac exam regular.  Monofilament testing normal bilateral feet.  No ankle edema.  Dorsalis pedis pulses intact.  Extremities warm and dry.      This note has been dictated using voice recognition software and as a result may contain minor grammatical errors and unintended word substitutions.

## 2021-05-31 VITALS — BODY MASS INDEX: 33.56 KG/M2 | WEIGHT: 221.44 LBS | HEIGHT: 68 IN

## 2021-05-31 VITALS — HEIGHT: 68 IN | WEIGHT: 220 LBS | BODY MASS INDEX: 33.34 KG/M2

## 2021-05-31 VITALS — WEIGHT: 216 LBS | BODY MASS INDEX: 32.84 KG/M2

## 2021-05-31 NOTE — TELEPHONE ENCOUNTER
Refill Approved    Rx renewed per Medication Renewal Policy. Medication was last renewed on 8/10/18.    Ann Edwards, [facility] Triage/Med Refill 8/1/2019     Requested Prescriptions   Pending Prescriptions Disp Refills     glipiZIDE (GLUCOTROL XL) 10 MG 24 hr tablet [Pharmacy Med Name: GLIPIZIDE ER 10 MG TABLET] 180 tablet 3     Sig: TAKE 1 TABLET BY MOUTH TWICE A DAY       Oral Hypoglycemics Refill Protocol Passed - 8/1/2019  8:33 AM        Passed - Visit with PCP or prescribing provider visit in last 6 months       Last office visit with prescriber/PCP: 7/10/2019 OR same dept: 7/10/2019 Mark Lin MD OR same specialty: 7/10/2019 Mark Lin MD Last physical: Visit date not found Last MTM visit: Visit date not found         Next appt within 3 mo: Visit date not found  Next physical within 3 mo: Visit date not found  Prescriber OR PCP: Mark Lin MD  Last diagnosis associated with med order: 1. Type 2 diabetes mellitus with diabetic cataract, without long-term current use of insulin (H)  - glipiZIDE (GLUCOTROL XL) 10 MG 24 hr tablet [Pharmacy Med Name: GLIPIZIDE ER 10 MG TABLET]; TAKE 1 TABLET BY MOUTH TWICE A DAY  Dispense: 180 tablet; Refill: 3     If protocol passes may refill for 12 months if within 3 months of last provider visit (or a total of 15 months).           Passed - A1C in last 6 months     Hemoglobin A1c   Date Value Ref Range Status   07/10/2019 8.2 (H) 3.5 - 6.0 % Final               Passed - Microalbumin in last year      Microalbumin, Random Urine   Date Value Ref Range Status   07/10/2019 3.32 (H) 0.00 - 1.99 mg/dL Final                  Passed - Blood pressure in last year     BP Readings from Last 1 Encounters:   07/10/19 100/60             Passed - Serum creatinine in last year     Creatinine   Date Value Ref Range Status   07/10/2019 1.31 (H) 0.70 - 1.30 mg/dL Final

## 2021-06-01 VITALS — BODY MASS INDEX: 32.97 KG/M2 | HEIGHT: 68 IN | WEIGHT: 217.56 LBS

## 2021-06-01 VITALS — WEIGHT: 217 LBS | BODY MASS INDEX: 33.49 KG/M2

## 2021-06-01 VITALS — BODY MASS INDEX: 32.99 KG/M2 | WEIGHT: 217 LBS

## 2021-06-02 VITALS — WEIGHT: 216 LBS | BODY MASS INDEX: 33.33 KG/M2

## 2021-06-02 VITALS — WEIGHT: 220 LBS | HEIGHT: 68 IN | BODY MASS INDEX: 33.34 KG/M2

## 2021-06-03 VITALS — BODY MASS INDEX: 33.18 KG/M2 | WEIGHT: 215 LBS

## 2021-06-03 VITALS
HEART RATE: 70 BPM | WEIGHT: 214 LBS | DIASTOLIC BLOOD PRESSURE: 70 MMHG | HEIGHT: 68 IN | OXYGEN SATURATION: 95 % | SYSTOLIC BLOOD PRESSURE: 110 MMHG | BODY MASS INDEX: 32.43 KG/M2

## 2021-06-03 VITALS — WEIGHT: 219.31 LBS | HEIGHT: 68 IN | BODY MASS INDEX: 33.24 KG/M2

## 2021-06-03 NOTE — TELEPHONE ENCOUNTER
Refill Approved    Rx renewed per Medication Renewal Policy. Medication was last renewed on 2/26/19.    Ann Edwards, [facility] Triage/Med Refill 11/18/2019     Requested Prescriptions   Pending Prescriptions Disp Refills     metFORMIN (GLUCOPHAGE) 1000 MG tablet [Pharmacy Med Name: METFORMIN HCL 1,000 MG TABLET] 180 tablet 2     Sig: TAKE 1 TABLET BY MOUTH TWICE A DAY WITH MEALS       Metformin Refill Protocol Passed - 11/18/2019  2:33 AM        Passed - Blood pressure in last 12 months     BP Readings from Last 1 Encounters:   11/15/19 110/70             Passed - LFT or AST or ALT in last 12 months     Albumin   Date Value Ref Range Status   11/15/2019 3.8 3.5 - 5.0 g/dL Final     Bilirubin, Total   Date Value Ref Range Status   11/15/2019 1.2 (H) 0.0 - 1.0 mg/dL Final     Alkaline Phosphatase   Date Value Ref Range Status   11/15/2019 74 45 - 120 U/L Final     AST   Date Value Ref Range Status   11/15/2019 18 0 - 40 U/L Final     ALT   Date Value Ref Range Status   11/15/2019 32 0 - 45 U/L Final     Protein, Total   Date Value Ref Range Status   11/15/2019 6.7 6.0 - 8.0 g/dL Final                Passed - GFR or Serum Creatinine in last 6 months     GFR MDRD Non Af Amer   Date Value Ref Range Status   11/15/2019 55 (L) >60 mL/min/1.73m2 Final     GFR MDRD Af Amer   Date Value Ref Range Status   11/15/2019 >60 >60 mL/min/1.73m2 Final             Passed - Visit with PCP or prescribing provider visit in last 6 months or next 3 months     Last office visit with prescriber/PCP: 11/15/2019 OR same dept: 11/15/2019 Mark Lin MD OR same specialty: 11/15/2019 Mark Lin MD Last physical: Visit date not found Last MTM visit: Visit date not found         Next appt within 3 mo: Visit date not found  Next physical within 3 mo: Visit date not found  Prescriber OR PCP: Mark Lin MD  Last diagnosis associated with med order: 1. Type 2 diabetes mellitus with diabetic cataract, without long-term current  use of insulin (H)  - metFORMIN (GLUCOPHAGE) 1000 MG tablet [Pharmacy Med Name: METFORMIN HCL 1,000 MG TABLET]; TAKE 1 TABLET BY MOUTH TWICE A DAY WITH MEALS  Dispense: 180 tablet; Refill: 2     If protocol passes may refill for 12 months if within 3 months of last provider visit (or a total of 15 months).           Passed - A1C in last 6 months     Hemoglobin A1c   Date Value Ref Range Status   11/15/2019 7.5 (H) 3.5 - 6.0 % Final               Passed - Microalbumin in last year      Microalbumin, Random Urine   Date Value Ref Range Status   07/10/2019 3.32 (H) 0.00 - 1.99 mg/dL Final
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
## 2021-06-04 VITALS
OXYGEN SATURATION: 92 % | BODY MASS INDEX: 32.96 KG/M2 | TEMPERATURE: 97.4 F | HEART RATE: 73 BPM | SYSTOLIC BLOOD PRESSURE: 110 MMHG | DIASTOLIC BLOOD PRESSURE: 60 MMHG | WEIGHT: 212 LBS

## 2021-06-04 VITALS — WEIGHT: 212 LBS | BODY MASS INDEX: 32.71 KG/M2

## 2021-06-04 VITALS
SYSTOLIC BLOOD PRESSURE: 116 MMHG | BODY MASS INDEX: 33.45 KG/M2 | HEART RATE: 87 BPM | DIASTOLIC BLOOD PRESSURE: 65 MMHG | TEMPERATURE: 98.9 F | HEIGHT: 67 IN | OXYGEN SATURATION: 96 % | WEIGHT: 213.13 LBS

## 2021-06-05 VITALS
BODY MASS INDEX: 32.18 KG/M2 | OXYGEN SATURATION: 96 % | SYSTOLIC BLOOD PRESSURE: 100 MMHG | WEIGHT: 207 LBS | HEART RATE: 95 BPM | DIASTOLIC BLOOD PRESSURE: 60 MMHG

## 2021-06-05 VITALS
WEIGHT: 209 LBS | TEMPERATURE: 96.4 F | OXYGEN SATURATION: 93 % | SYSTOLIC BLOOD PRESSURE: 100 MMHG | DIASTOLIC BLOOD PRESSURE: 60 MMHG | HEART RATE: 93 BPM | BODY MASS INDEX: 32.8 KG/M2 | HEIGHT: 67 IN

## 2021-06-07 NOTE — PROGRESS NOTES
"Clif Christie is a 64 y.o. male who is being evaluated via a billable telephone visit.      The patient has been notified of following:     \"This telephone visit will be conducted via a call between you and your physician/provider. We have found that certain health care needs can be provided without the need for a physical exam.  This service lets us provide the care you need with a short phone conversation.  If a prescription is necessary we can send it directly to your pharmacy.  If lab work is needed we can place an order for that and you can then stop by our lab to have the test done at a later time.    If during the course of the call the physician/provider feels a telephone visit is not appropriate, you will not be charged for this service.\"         Clif Christie complains of    Chief Complaint   Patient presents with     Diabetes     pt fasting        I have reviewed and updated the patient's Past Medical History, Social History, Family History and Medication List.    ALLERGIES  Patient has no known allergies.    S.O. 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  Type 2 diabetes.  Prior A1c of 7.5% on November 15, 2019 which had improved from 8.2% previously.  A1c's have averaged in the 7-7.9 range over past several years.  Continues metformin 1000 mg twice daily and glipizide extended release 10 mg twice daily.  Remains on aspirin 81 mg daily.  Blood sugar can be as low as 70 at times and remains asymptomatic otherwise on average 110-120 with majority of blood sugar readings less than 140.  Weight at home was 212 pounds this morning on " scale.  Has not been below 210 pounds in recent memory.  Does recognize weight goal of less than 200 pounds ideally.  Lisinopril hydrochlorothiazide 20/12.5 daily for hypertension and atorvastatin 40 mg daily for lipid management.  History of CKD stage IIIa with creatinine 1.30 GFR 55.  Adenomatous colon polyp on prior colonoscopy September 26, 2019 with recommended follow-up 5-year interval.        Assessment/Plan:    1. Type 2 diabetes mellitus with retinopathy, without long-term current use of insulin, macular edema presence unspecified, unspecified laterality, unspecified retinopathy severity (H)  Continue glipizide extended release 10 mg twice daily metformin 1000 mg twice daily.  Continue to monitor renal function while on metformin.  Anticipate reassessment in office in next 3 to 4 months with fasting labs at that time.    2. Class 1 obesity due to excess calories with serious comorbidity and body mass index (BMI) of 33.0 to 33.9 in adult  Dietary and exercise modification for weight goal less than 205 pounds initially, less than 200 pounds ideally.    3. Essential hypertension  Hypertension stable with lisinopril hydrochlorothiazide 20/12.5 using 1 tab daily without side effects of cough, dizziness etc.    4. Hyperlipidemia, unspecified hyperlipidemia type  Gato on atorvastatin 40 mg daily with prior LDL 64 July 10, 2019.    5. Tubular adenoma of colon  History of tubular adenoma on prior colonoscopy September 26, 2019.  Will repeat at 5-year interval.        Phone call duration:  12 minutes    Mark Lin MD

## 2021-06-07 NOTE — TELEPHONE ENCOUNTER
Refill Approved    Rx renewed per Medication Renewal Policy. Medication was last renewed on 3/3/17.    Ann Edwards, [facility] Triage/Med Refill 4/16/2020     Requested Prescriptions   Pending Prescriptions Disp Refills     atorvastatin (LIPITOR) 40 MG tablet [Pharmacy Med Name: ATORVASTATIN 40 MG TABLET] 90 tablet 3     Sig: TAKE 1 TABLET BY MOUTH EVERY DAY       Statins Refill Protocol (Hmg CoA Reductase Inhibitors) Passed - 4/16/2020 12:31 AM        Passed - PCP or prescribing provider visit in past 12 months      Last office visit with prescriber/PCP: 11/15/2019 Mark Lin MD OR same dept: 11/15/2019 Mark Lin MD OR same specialty: 11/15/2019 Mark Lin MD  Last physical: 10/24/2018 Last MTM visit: Visit date not found   Next visit within 3 mo: Visit date not found  Next physical within 3 mo: Visit date not found  Prescriber OR PCP: Mark Lin MD  Last diagnosis associated with med order: 1. Hyperlipidemia, unspecified hyperlipidemia type  - atorvastatin (LIPITOR) 40 MG tablet [Pharmacy Med Name: ATORVASTATIN 40 MG TABLET]; TAKE 1 TABLET BY MOUTH EVERY DAY  Dispense: 90 tablet; Refill: 3    2. Essential hypertension with goal blood pressure less than 130/80  - lisinopriL-hydrochlorothiazide (PRINZIDE,ZESTORETIC) 20-12.5 mg per tablet [Pharmacy Med Name: LISINOPRIL-HCTZ 20-12.5 MG TAB]; TAKE 1 TABLET BY MOUTH DAILY  Dispense: 90 tablet; Refill: 3    If protocol passes may refill for 12 months if within 3 months of last provider visit (or a total of 15 months).                lisinopriL-hydrochlorothiazide (PRINZIDE,ZESTORETIC) 20-12.5 mg per tablet [Pharmacy Med Name: LISINOPRIL-HCTZ 20-12.5 MG TAB] 90 tablet 3     Sig: TAKE 1 TABLET BY MOUTH DAILY       Diuretics/Combination Diuretics Refill Protocol  Passed - 4/16/2020 12:31 AM        Passed - Visit with PCP or prescribing provider visit in past 12 months     Last office visit with prescriber/PCP: 11/15/2019 Mark Lin  MD OR same dept: 11/15/2019 Mark Lin MD OR same specialty: 11/15/2019 Mark Lin MD  Last physical: 10/24/2018 Last MTM visit: Visit date not found   Next visit within 3 mo: Visit date not found  Next physical within 3 mo: Visit date not found  Prescriber OR PCP: Mark Lin MD  Last diagnosis associated with med order: 1. Hyperlipidemia, unspecified hyperlipidemia type  - atorvastatin (LIPITOR) 40 MG tablet [Pharmacy Med Name: ATORVASTATIN 40 MG TABLET]; TAKE 1 TABLET BY MOUTH EVERY DAY  Dispense: 90 tablet; Refill: 3    2. Essential hypertension with goal blood pressure less than 130/80  - lisinopriL-hydrochlorothiazide (PRINZIDE,ZESTORETIC) 20-12.5 mg per tablet [Pharmacy Med Name: LISINOPRIL-HCTZ 20-12.5 MG TAB]; TAKE 1 TABLET BY MOUTH DAILY  Dispense: 90 tablet; Refill: 3    If protocol passes may refill for 12 months if within 3 months of last provider visit (or a total of 15 months).             Passed - Serum Potassium in past 12 months      Lab Results   Component Value Date    Potassium 4.7 11/15/2019             Passed - Serum Sodium in past 12 months      Lab Results   Component Value Date    Sodium 142 11/15/2019             Passed - Blood pressure on file in past 12 months     BP Readings from Last 1 Encounters:   11/15/19 110/70             Passed - Serum Creatinine in past 12 months      Creatinine   Date Value Ref Range Status   11/15/2019 1.30 0.70 - 1.30 mg/dL Final

## 2021-06-08 NOTE — TELEPHONE ENCOUNTER
Left message to call back for: pt  Information to relay to patient:  Spoke with spouse and she will relay the message to call and schedule with dr doty next week for his DOT px.

## 2021-06-08 NOTE — TELEPHONE ENCOUNTER
Ok for DOT physical next week.  Please advise patient that the [employer] has granted an extension for DOT certificates due to COVID19.  It is okay to wait until next week

## 2021-06-08 NOTE — TELEPHONE ENCOUNTER
Who is calling:  Patient   Reason for Call:  DOT physical - see below   Date of last appointment with primary care:   Okay to leave a detailed message: Yes    Patients DOT card expires next week.     Wanting to know if DOT physical can be done at the clinic.

## 2021-06-08 NOTE — TELEPHONE ENCOUNTER
Spoke with spouse and she will let Clif know the next face to face opening would be the week of June 29th for his DOT px.

## 2021-06-09 NOTE — PROGRESS NOTES
Medical Examination      Assessment:      Healthy male exam.   Meets standards, but periodic monitoring required due to Type 2 diabetes and hypertension.   qualified only for 1 year.      Plan:        Medical examiners certificate completed and printed.  Return as needed.   Follow up with PCP for management of chronic medical conditions         Subjective:      Clif Christie is a 64 y.o. male who presents today for a  fitness determination physical exam.. The patient reports no problems.  He has a class D license.  Drives a  truck.  Followed by primary care provider for management of hypertension, type 2 diabetes and hyperlipidemia.  Overall feels well.  Reviewed medical history.  Denies concerns with dizziness and lightheadedness.  No chest pain or dyspnea.  No lower extremity edema.  Reports good control of blood sugars.  No episodes of hypoglycemia.  No adverse side effects associated with his medications.  He has chronic tinnitus and decreased hearing in right ear.  Has history of prior work injury about 30 years ago in which he lost the tip of his right thumb.  He has yearly eye exams with his ophthalmologist.    He does have early cataracts for which monitoring but no treatment is required.  He has asymmetrical hearing loss.  He was felt to be candidate for hearing aids.  He notices a little bit of tinnitus in the right ear but otherwise feels that his hearing is adequate and does not wish to pursue hearing aids at this time.  He is retiring in 6 months.  He is otherwise feeling well.  No other questions or concerns today          The patient reports no problems.  The following portions of the patient's history were reviewed and updated as appropriate: allergies, current medications, past family history, past medical history, past social history, past surgical history and problem list.  Review of Systems  A 12 point comprehensive review of systems was  "negative except as noted.     Objective:      Vision:   Uncorrected Corrected Horizontal Field of Vision   Right Eye 20/32-1  >90 degrees   Left Eye  20/25  >90 degrees   Both Eyes  20/25-1       Applicant can recognize and distinguish among traffic control signals and devices showing standard red, green, and gilson colors.         Monocular Vision?: No      Hearing:    Able to hear forced whisper at greater than or equal to 5 feet in both ears       /65 (Patient Site: Right Arm, Patient Position: Sitting, Cuff Size: Adult Large)   Pulse 87   Temp 98.9  F (37.2  C) (Oral)   Ht 5' 7.25\" (1.708 m)   Wt 213 lb 2 oz (96.7 kg)   SpO2 96%   BMI 33.13 kg/m    General appearance: alert, appears stated age and cooperative  Head: Normocephalic, without obvious abnormality, atraumatic  Eyes: conjunctivae/corneas clear. PERRL, EOM's intact. Fundi benign.  Ears: normal TM's and external ear canals both ears  Nose: Nares normal. Septum midline. Mucosa normal. No drainage or sinus tenderness.  Throat: lips, mucosa, and tongue normal; teeth and gums normal  Neck: no adenopathy, no carotid bruit, no JVD, supple, symmetrical, trachea midline and thyroid not enlarged, symmetric, no tenderness/mass/nodules  Back: symmetric, no curvature. ROM normal. No CVA tenderness.  Lungs: clear to auscultation bilaterally  Chest wall: no tenderness  Heart: regular rate and rhythm, S1, S2 normal, no murmur, click, rub or gallop  Abdomen: soft, non-tender; bowel sounds normal; no masses,  no organomegaly  Extremities: extremities normal, atraumatic, no cyanosis or edema  Pulses: 2+ and symmetric  Skin: Skin color, texture, turgor normal. No rashes or lesions  Neurologic: Grossly normal    Labs:      Recent Results (from the past 24 hour(s))   Glycosylated Hemoglobin A1c    Collection Time: 06/29/20  9:12 AM   Result Value Ref Range    Hemoglobin A1c 7.4 (H) 3.5 - 6.0 %   Urinalysis-UC if Indicated    Collection Time: 06/29/20  9:48 AM "   Result Value Ref Range    Color, UA Yellow Colorless, Yellow, Straw, Light Yellow    Clarity, UA Clear Clear    Glucose, UA Negative Negative    Bilirubin, UA Negative Negative    Ketones, UA Negative Negative    Specific Gravity, UA >=1.030 1.005 - 1.030    Blood, UA Negative Negative    pH, UA 5.0 5.0 - 8.0    Protein, UA 30 mg/dL (!) Negative mg/dL    Urobilinogen, UA 0.2 E.U./dL 0.2 E.U./dL, 1.0 E.U./dL    Nitrite, UA Negative Negative    Leukocytes, UA Negative Negative    Bacteria, UA Few (!) None Seen hpf    RBC, UA None Seen None Seen, 0-2 hpf    WBC, UA 0-5 None Seen, 0-5 hpf    Squam Epithel, UA 0-5 None Seen, 0-5 lpf    Mucus, UA Moderate (!) None Seen lpf

## 2021-06-10 NOTE — PROGRESS NOTES
Assessment/Plan:    1. Type 2 diabetes mellitus without complication, without long-term current use of insulin (H)  Recent A1c 7.4% June 29, 2020 and will reassess at upcoming annual wellness visit December 2020.  Recent eye exam June 13, 2020 without diabetic retinopathy noted.  Monofilament testing today was normal.  Will update microalbumin screen.  Continue to use of metformin 1000 mg twice daily and glipizide extended release 10 mg twice daily.  - Basic Metabolic Panel  - Microalbumin, Random Urine; Future    2. Essential hypertension  Hypertension at goal.  Lisinopril hydrochlorothiazide 20/12.5 daily continues.  Med monitoring completed.  - Basic Metabolic Panel    3. Mixed hyperlipidemia  Patient is fasting.  Continues use of atorvastatin 40 mg daily.  Lipid cascade obtained.  Therapeutic lifestyle changes reviewed for weight goal less than 200 pounds initially, less than 190 pounds ideally.  - Lipid Cascade    4. Class 1 obesity due to excess calories with serious comorbidity and body mass index (BMI) of 33.0 to 33.9 in adult  As above, therapeutic lifestyle changes reviewed for weight goal less than 200 pounds initially, less than 190 pounds ideally.      The following high BMI interventions were performed this visit: encouragement to exercise, weight monitoring, weight loss from baseline weight and lifestyle education regarding diet .  Ensure ongoing efforts to achieve weight goal < 200 pounds initially, < 190 pounds ideally.         Subjective:    Clif Christie is seen today for follow-up evaluation.  Type 2 diabetes.  Doing well.  Metformin 1000 mg twice daily as well as glipizide extended release 10 mg twice daily.  Remains on aspirin 81 mg daily.  Recent diabetic eye exam June 13, 2020 without diabetic retinopathy.  Prior microalbumin screen normal July 10, 2019.  Continues lisinopril hydrochlorothiazide 20/12.5 daily for hypertension.  No cough.  No dizziness.  Atorvastatin 40 mg daily for lipid  "management.  Has lost 2 pounds since prior physical November 15, 2019.  Trying to get under 210 pounds initially and maintain this weight.  Denies recent illness.  No fever cough or shortness of breath.  Will need Pneumovax booster had physical in December 2020.  Comprehensive review of systems as above otherwise all negative.    S.O. 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 No pertinent surgical history.     Family History   Problem Relation Age of Onset     Diabetes Mother      Diabetes Father      Diabetes Brother         Past Medical History:   Diagnosis Date     Diabetes mellitus (H)      Hypertension         Social History     Tobacco Use     Smoking status: Light Tobacco Smoker     Types: Cigarettes     Smokeless tobacco: Never Used   Substance Use Topics     Alcohol use: Yes     Comment: rare     Drug use: No        Current Outpatient Medications   Medication Sig Dispense Refill     aspirin 81 MG EC tablet Take 81 mg by mouth daily.       atorvastatin (LIPITOR) 40 MG tablet TAKE 1 TABLET BY MOUTH EVERY DAY 90 tablet 1     blood glucose test (GLUCOSE BLOOD) strips Use 1 each As Directed daily. Accu-Chek Palma in vitro strips.  Test once a day 100 strip 3     cholecalciferol, vitamin D3, (VITAMIN D3) 2,000 unit capsule Take 1,000 Units by mouth daily.       glipiZIDE (GLUCOTROL XL) 10 MG 24 hr tablet TAKE 1 TABLET BY MOUTH TWICE A  tablet 3     lisinopriL-hydrochlorothiazide (PRINZIDE,ZESTORETIC) 20-12.5 mg per tablet TAKE 1 TABLET BY MOUTH DAILY 90 tablet 1     metFORMIN (GLUCOPHAGE) 1000 MG tablet TAKE 1 TABLET BY MOUTH TWICE A DAY WITH MEALS 180 tablet 2     No current " facility-administered medications for this visit.           Objective:    Vitals:    08/21/20 0844   BP: 110/60   Pulse: 73   Temp: 97.4  F (36.3  C)   SpO2: 92%   Weight: 212 lb (96.2 kg)      Body mass index is 32.96 kg/m .    Alert.  No apparent distress.  Chest clear.  Cardiac exam regular.  BMI 32.96.  Monofilament testing normal.  No ankle edema.      This note has been dictated using voice recognition software and as a result may contain minor grammatical errors and unintended word substitutions.

## 2021-06-10 NOTE — TELEPHONE ENCOUNTER
RN cannot approve Refill Request    RN can NOT refill this medication PCP messaged that patient is overdue for Labs. Last office visit: 11/15/2019 Mark Lin MD Last Physical: 10/24/2018 Last MTM visit: Visit date not found Last visit same specialty: 6/29/2020 Judi Garner MD.  Next visit within 3 mo: Visit date not found  Next physical within 3 mo: Visit date not found      Deja Oconnor, [facility] Triage/Med Refill 8/2/2020    Requested Prescriptions   Pending Prescriptions Disp Refills     metFORMIN (GLUCOPHAGE) 1000 MG tablet [Pharmacy Med Name: METFORMIN HCL 1,000 MG TABLET] 180 tablet 2     Sig: TAKE 1 TABLET BY MOUTH TWICE A DAY WITH MEALS       Metformin Refill Protocol Failed - 8/1/2020 12:52 AM        Failed - Microalbumin in last year      Microalbumin, Random Urine   Date Value Ref Range Status   07/10/2019 3.32 (H) 0.00 - 1.99 mg/dL Final                  Passed - Blood pressure in last 12 months     BP Readings from Last 1 Encounters:   06/29/20 116/65             Passed - LFT or AST or ALT in last 12 months     Albumin   Date Value Ref Range Status   11/15/2019 3.8 3.5 - 5.0 g/dL Final     Bilirubin, Total   Date Value Ref Range Status   11/15/2019 1.2 (H) 0.0 - 1.0 mg/dL Final     Alkaline Phosphatase   Date Value Ref Range Status   11/15/2019 74 45 - 120 U/L Final     AST   Date Value Ref Range Status   11/15/2019 18 0 - 40 U/L Final     ALT   Date Value Ref Range Status   11/15/2019 32 0 - 45 U/L Final     Protein, Total   Date Value Ref Range Status   11/15/2019 6.7 6.0 - 8.0 g/dL Final                Passed - GFR or Serum Creatinine in last 6 months     GFR MDRD Non Af Amer   Date Value Ref Range Status   11/15/2019 55 (L) >60 mL/min/1.73m2 Final     GFR MDRD Af Amer   Date Value Ref Range Status   11/15/2019 >60 >60 mL/min/1.73m2 Final             Passed - Visit with PCP or prescribing provider visit in last 6 months or next 3 months     Last office visit with prescriber/PCP:  Visit date not found OR same dept: 6/29/2020 Judi Garner MD OR same specialty: 6/29/2020 Judi Garner MD Last physical: Visit date not found Last MTM visit: Visit date not found         Next appt within 3 mo: Visit date not found  Next physical within 3 mo: Visit date not found  Prescriber OR PCP: Mark Lin MD  Last diagnosis associated with med order: 1. Type 2 diabetes mellitus with diabetic cataract, without long-term current use of insulin (H)  - metFORMIN (GLUCOPHAGE) 1000 MG tablet [Pharmacy Med Name: METFORMIN HCL 1,000 MG TABLET]; TAKE 1 TABLET BY MOUTH TWICE A DAY WITH MEALS  Dispense: 180 tablet; Refill: 2     If protocol passes may refill for 12 months if within 3 months of last provider visit (or a total of 15 months).           Passed - A1C in last 6 months     Hemoglobin A1c   Date Value Ref Range Status   06/29/2020 7.4 (H) 3.5 - 6.0 % Final

## 2021-06-10 NOTE — TELEPHONE ENCOUNTER
RN cannot approve Refill Request    RN can NOT refill this medication PCP messaged that patient is overdue for Labs. Last office visit: 11/15/2019 Mark Lin MD Last Physical: 10/24/2018 Last MTM visit: Visit date not found Last visit same specialty: 6/29/2020 Judi Garner MD.  Next visit within 3 mo: Visit date not found  Next physical within 3 mo: Visit date not found      Deja Oconnor, [facility] Triage/Med Refill 8/10/2020    Requested Prescriptions   Pending Prescriptions Disp Refills     glipiZIDE (GLUCOTROL XL) 10 MG 24 hr tablet [Pharmacy Med Name: GLIPIZIDE ER 10 MG TABLET] 180 tablet 3     Sig: TAKE 1 TABLET BY MOUTH TWICE A DAY       Oral Hypoglycemics Refill Protocol Failed - 8/9/2020 12:30 AM        Failed - Microalbumin in last year      Microalbumin, Random Urine   Date Value Ref Range Status   07/10/2019 3.32 (H) 0.00 - 1.99 mg/dL Final                  Passed - Visit with PCP or prescribing provider visit in last 6 months       Last office visit with prescriber/PCP: Visit date not found OR same dept: 6/29/2020 Judi Garner MD OR same specialty: 6/29/2020 Judi Garner MD Last physical: Visit date not found Last MTM visit: Visit date not found         Next appt within 3 mo: Visit date not found  Next physical within 3 mo: Visit date not found  Prescriber OR PCP: Mark Lin MD  Last diagnosis associated with med order: 1. Type 2 diabetes mellitus with diabetic cataract, without long-term current use of insulin (H)  - glipiZIDE (GLUCOTROL XL) 10 MG 24 hr tablet [Pharmacy Med Name: GLIPIZIDE ER 10 MG TABLET]; TAKE 1 TABLET BY MOUTH TWICE A DAY  Dispense: 180 tablet; Refill: 3     If protocol passes may refill for 12 months if within 3 months of last provider visit (or a total of 15 months).           Passed - A1C in last 6 months     Hemoglobin A1c   Date Value Ref Range Status   06/29/2020 7.4 (H) 3.5 - 6.0 % Final               Passed - Blood pressure in last year     BP  Readings from Last 1 Encounters:   06/29/20 116/65             Passed - Serum creatinine in last year     Creatinine   Date Value Ref Range Status   11/15/2019 1.30 0.70 - 1.30 mg/dL Final
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
## 2021-06-11 NOTE — PROGRESS NOTES
Assessment:     1. Routine general medical examination at a health care facility  Td, Adult, Adsorbed (blue label)    Varicella-zoster vaccine subcutaneous   2. Non morbid obesity due to excess calories     3. Type 2 diabetes mellitus  Glycosylated Hemoglobin A1c    Glycosylated Hemoglobin A1c    Basic Metabolic Panel   4. Essential hypertension with goal blood pressure less than 130/80  Basic Metabolic Panel   5. Hyperlipidemia, unspecified hyperlipidemia type     6. Diabetic retinopathy     7. Tubular adenoma of colon          Plan:      Routine healthcare maintenance.  Preventative cares reviewed.  Repeat colonoscopy August 2019 with history of multiple tubular adenomas with advanced adenoma noted.  Zostavax immunization and tetanus booster were provided today.  Basic metabolic panel to ensure stable glycemic control as well as renal function with recent mild renal insufficiency.  Continue atorvastatin 40 mg daily for lipid management.  Remains on metformin 1000 mg twice daily and glipizide extended release 10 mg twice daily for diabetes management with A1c improving from 7.7% to 7.3%.  Improvement needed and consistent exercise as well as dietary modifications for weight goal less than 210 pounds initially, less than 200 pounds ideally.  Continue lisinopril hydrochlorothiazide 20/12.51 tablet daily for hypertension management with recent microalbumin screen normal March 3, 2017.  Diabetic follow-up in 4 months otherwise continue annual physical exams.    I have had an Advance Directives discussion with the patient.  The following high BMI interventions were performed this visit: encouragement to exercise, weight monitoring, weight loss from baseline weight and lifestyle education regarding diet        Subjective:      Clif Christie is a 61 y.o. male who presents for an annual exam.  Doing well.  Type 2 diabetes.  History of diabetic retinopathy.  [facility] diabetic eye exam March 2017 through [facility]  "clinic.  Likely cataracts.  Will likely will have repaired in future.  Continues metformin 1000 mg twice daily glipizide extended release 10 mg twice daily for diabetes management with prior A1c increasing from 7.5% up to 7.7%.  Patient was anticipating glycemic control would be stable today however did see decrease in A1c from 7.7% 7.3% today.  Does not require refills at this time.  Multiple tubular adenomas on prior colonoscopy August 31, 2016 and told her repeat 3 year interval.  Continues atorvastatin 40 mg daily for lipid management and lisinopril hydrochlorothiazide 20/12.51 tablet daily for hypertension management and renal protective effects.    S.O. 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 - quit 12/08 (occasional cheat with cigarette...) - quit 3/13/13 again   Infrequent EtOH   No surgeries   No hospitalizations     Healthy Habits:   Regular Exercise: No  Healthy Diet: improved  Dental Visits Regularly: Yes  Seat Belt: Yes   Sexually active: Yes  Colonoscopy: Yes and 8/31/16 (multiple tubular adenomas with \"advanced\" adenoma - repeat in 3 years)  Lipid Profile: Yes  Glucose Screen: Yes    Immunization History   Administered Date(s) Administered     Pneumo Polysac 23-V 08/17/2007     Td, historic 01/11/2000, 08/17/2007     Tdap 08/17/2007     Immunization status: needs Td booster and Zostervax.  Vision Screening:both eyes and March, 2017 with [facility] (h/o diabetic retinopathy and likely bilateral cataracts)  Hearing: PASS     Current Outpatient Prescriptions   Medication Sig Dispense Refill     aspirin 81 MG EC tablet Take 81 mg by mouth daily.       atorvastatin (LIPITOR) 40 MG tablet TAKE ONE TABLET BY MOUTH ONE TIME DAILY 90 tablet 2     blood sugar diagnostic (GLUCOSE BLOOD) Strp Accu-Chek Palma in vitro strips.  " "Test twice daily.       glipiZIDE (GLUCOTROL) 10 MG 24 hr tablet TAKE ONE TABLET BY MOUTH TWICE DAILY 180 tablet 2     lisinopril-hydrochlorothiazide (PRINZIDE,ZESTORETIC) 20-12.5 mg per tablet TAKE ONE TABLET BY MOUTH ONE TIME DAILY 90 tablet 2     metFORMIN (GLUCOPHAGE) 1000 MG tablet TAKE ONE TABLET BY MOUTH TWICE A DAY WITH MEALS 180 tablet 2     No current facility-administered medications for this visit.      Past Medical History:   Diagnosis Date     Diabetes mellitus      Hypertension      History reviewed. No pertinent surgical history.  Review of patient's allergies indicates no known allergies.  Family History   Problem Relation Age of Onset     Diabetes Mother      Diabetes Father      Diabetes Brother      Social History     Social History     Marital status: Single     Spouse name: N/A     Number of children: N/A     Years of education: N/A     Occupational History     Not on file.     Social History Main Topics     Smoking status: Light Tobacco Smoker     Types: Cigarettes     Smokeless tobacco: Never Used     Alcohol use Yes      Comment: rare     Drug use: No     Sexual activity: Yes     Partners: Female     Other Topics Concern     Not on file     Social History Narrative       Review of Systems  Comprehensive ROS: as above, otherwise all negative.           Objective:     /60  Pulse 80  Ht 5' 8\" (1.727 m)  Wt 220 lb (99.8 kg)  BMI 33.45 kg/m2  Body mass index is 33.45 kg/(m^2).    Physical    General Appearance:    Alert, cooperative, no distress, appears stated age.  Obesity.   Head:    Normocephalic, without obvious abnormality, atraumatic   Eyes:    PERRL, conjunctiva/corneas clear, EOM's intact, fundi     benign, both eyes        Ears:    Normal TM's and external ear canals, both ears   Nose:   Nares normal, septum midline, mucosa normal, no drainage    or sinus tenderness   Throat:   Lips, mucosa, and tongue normal; teeth and gums normal   Neck:   Supple, symmetrical, trachea " midline, no adenopathy;        thyroid:  No enlargement/tenderness/nodules; no carotid    bruit or JVD   Back:     Symmetric, no curvature, ROM normal, no CVA tenderness   Lungs:     Clear to auscultation bilaterally, respirations unlabored   Chest wall:    No tenderness or deformity   Heart:    Regular rate and rhythm, S1 and S2 normal, no murmur, rub   or gallop   Abdomen:     Soft, non-tender, bowel sounds active all four quadrants,     no masses, no organomegaly.  Obese.   Genitalia:    Normal male without lesion, discharge or tenderness.  No inguinal hernia noted.     Rectal:    Normal tone.  Prostate normal/symmetric, no masses or tenderness.   Extremities:   Extremities normal, atraumatic, no cyanosis or edema   Pulses:   2+ and symmetric all extremities   Skin:   Skin color, texture, turgor normal, no rashes or lesions   Lymph nodes:   Cervical, supraclavicular, and axillary nodes normal   Neurologic:   CNII-XII intact. Normal strength, sensation and reflexes       throughout

## 2021-06-13 NOTE — PROGRESS NOTES
Assessment:    1. Type 2 diabetes mellitus  Glycosylated Hemoglobin A1c    Comprehensive Metabolic Panel   2. Essential hypertension  Comprehensive Metabolic Panel   3. Hyperlipidemia, unspecified hyperlipidemia type  Lipid Cascade   4. Cataracts, bilateral     5. Hyperbilirubinemia  Comprehensive Metabolic Panel    HM2(CBC w/o Differential)   6. Type 2 diabetes mellitus with diabetic cataract, without long-term current use of insulin  metFORMIN (GLUCOPHAGE) 1000 MG tablet    glipiZIDE (GLUCOTROL XL) 10 MG 24 hr tablet         Plan:    Type 2 diabetes, stable.  A1c had increased slightly from 7.3% 7.5% despite 4 pound weight loss.  Did check renal function and fasting glucose today with patient's fasting glucose this morning 125 at home.  Check lipid cascade otherwise continues atorvastatin 40 mg daily.  Remains a metformin 1000 mg twice daily and glipizide extended release 10 mg twice daily.  Refill sent to pharmacy to be filled upon patient request.  Continue lisinopril hydrochlorothiazide 20/12.5 using 1 tablet daily for hypertension and renal protective effects with prior microalbumin screen normal March 3, 2017.  Patient did have eye exam in March and will [facility] report regarding likely cataracts and question history of diabetic retinopathy.  Will need repeat colonoscopy in August 2019 due to multiple tubular adenomas.  Patient declines flu shot otherwise immunizations reviewed and up-to-date.        Subjective:    SarwatHEBERT Christie is seen today for type 2 diabetes management.  Continues metformin 1000 mg twice daily and glipizide extended release using 10 mg twice daily.  No hypoglycemic symptoms.  Blood sugar was 125 this morning.  Remains on lisinopril hydrochlorothiazide 20/12.5 using 1 tablet daily for hypertension management.  Prior microalbumin screen normal March 3, 2017.  Atorvastatin 40 mg daily for lipid management.  History of mild bilirubin elevation in the past.  No  "abdominal complaints.  Normal appetite.  No diarrhea.  Comprehensive review of systems as above otherwise all negative.    S.O. 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 No pertinent surgical history.     Family History   Problem Relation Age of Onset     Diabetes Mother      Diabetes Father      Diabetes Brother         Past Medical History:   Diagnosis Date     Diabetes mellitus      Hypertension         Social History   Substance Use Topics     Smoking status: Light Tobacco Smoker     Types: Cigarettes     Smokeless tobacco: Never Used     Alcohol use Yes      Comment: rare        Current Outpatient Prescriptions   Medication Sig Dispense Refill     aspirin 81 MG EC tablet Take 81 mg by mouth daily.       atorvastatin (LIPITOR) 40 MG tablet TAKE ONE TABLET BY MOUTH ONE TIME DAILY 90 tablet 2     blood sugar diagnostic (GLUCOSE BLOOD) Strp Accu-Chek Palma in vitro strips.  Test twice daily.       glipiZIDE (GLUCOTROL XL) 10 MG 24 hr tablet TAKE ONE TABLET BY MOUTH TWICE DAILY 180 tablet 2     lisinopril-hydrochlorothiazide (PRINZIDE,ZESTORETIC) 20-12.5 mg per tablet TAKE ONE TABLET BY MOUTH ONE TIME DAILY 90 tablet 2     metFORMIN (GLUCOPHAGE) 1000 MG tablet TAKE ONE TABLET BY MOUTH TWICE A DAY WITH MEALS 180 tablet 2     No current facility-administered medications for this visit.           Objective:    Vitals:    11/07/17 0800   BP: 110/70   Pulse: 80   Weight: 216 lb (98 kg)      Body mass index is 32.84 kg/(m^2).    Alert.  No apparent distress.  Chest clear.  Cardiac exam regular.  Monofilament testing normal bilateral feet also good dorsalis pedis and posterior tibial pulses " without ankle edema.  Extremities warm and dry.
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
## 2021-06-13 NOTE — PROGRESS NOTES
Assessment and Plan:     Patient has been advised of split billing requirements and indicates understanding: No     1. Encounter for general adult medical examination with abnormal findings  Annual wellness visit completed.  Risk associated with lack of exercise, suboptimal diet, need to install carbon monoxide detector, history of hearing concern with more tinnitus described in right ear as well as need to complete healthcare directives with paperwork provided today.  Annual wellness visits to continue.      2. Type 2 diabetes mellitus with retinopathy, without long-term current use of insulin, macular edema presence unspecified, unspecified laterality, unspecified retinopathy severity (H)  A1C remains stable (7.4 on 08/21/2020 to 7.8 today). Encouraged healthy lifestyle including regular exercise and healthy diet.  Anticipate reassessment at follow-up in 4 months.  - Glycosylated Hemoglobin A1C  - blood glucose test (GLUCOSE BLOOD) strips; Use 1 each As Directed daily. Accu-Chek Palma in vitro strips.  Test once a day  Dispense: 50 strip; Refill: 3  - glipiZIDE (GLUCOTROL XL) 10 MG 24 hr tablet; Take 1 tablet (10 mg total) by mouth 2 (two) times a day.  Dispense: 180 tablet; Refill: 1  - metFORMIN (GLUCOPHAGE) 1000 MG tablet; Take 1 tablet (1,000 mg total) by mouth 2 (two) times a day with meals.  Dispense: 180 tablet; Refill: 1  - Basic Metabolic Panel    3. Essential hypertension  This is well-controlled with current medication regimen.   - lisinopriL-hydrochlorothiazide (PRINZIDE,ZESTORETIC) 20-12.5 mg per tablet; TAKE 1 TABLET BY MOUTH EVERY DAY  Dispense: 90 tablet; Refill: 3  - Basic Metabolic Panel    4. Mixed hyperlipidemia  This is stable.  Most recent lipid cascade from August 21, 2020 reviewed.  Encouraged regular exercise and healthy diet.  Weight goal remains less than 200 pounds initially, less than 190 pounds ideally.  - atorvastatin (LIPITOR) 40 MG tablet; Take 1 tablet (40 mg total) by mouth  daily.  Dispense: 90 tablet; Refill: 3    5. Obesity  The patient has lost 3 pounds since last visit. Encouraged to continue exercise, healthy diet, and weight loss goal of 200 lbs initially, less than 190 pounds ideally.    6. Stage 3a chronic kidney disease  Stable with noted chronic kidney disease stage IIIa with prior creatinine 1.24 and GFR 55.  Will continue to monitor.   - Basic Metabolic Panel    7. Bilateral hearing loss, unspecified hearing loss type  The patient has not concerns with this. He does not believe he needs further audiology evaluation or hearing aides at this time.     8. Atypical squamoproliferative skin lesion  I do suspect that the patient has a seborrheic keratosis of the right temporal region. I suspect this is benign. However, given multiple moles on trunk, would appreciate dermatology evaluation.   - Ambulatory referral to Dermatology    The patient's current medical problems were reviewed.    I have had an Advance Directives discussion with the patient.  The following high BMI interventions were performed this visit: encouragement to exercise, weight monitoring, weight loss from baseline weight and lifestyle education regarding diet .  Ensure ongoing efforts to achieve weight goal < 200 pounds initially, < 190 pounds ideally.     The following health maintenance schedule was reviewed with the patient and provided in printed form in the after visit summary:   Health Maintenance   Topic Date Due     ZOSTER VACCINES (2 of 3) 08/30/2017     INFLUENZA VACCINE RULE BASED (1) 08/01/2020     Pneumococcal Vaccine: 65+ Years (2 of 2 - PPSV23) 11/15/2020     DIABETIC FOOT EXAM  11/15/2020     DIABETIC EYE EXAM  06/13/2021     A1C  06/22/2021     BMP  08/21/2021     LIPID  08/21/2021     MICROALBUMIN  08/21/2021     MEDICARE ANNUAL WELLNESS VISIT  12/22/2021     FALL RISK ASSESSMENT  12/22/2021     COLORECTAL CANCER SCREENING  09/26/2024     ADVANCE CARE PLANNING  12/22/2025     TD 18+ HE   "07/05/2027     HEPATITIS C SCREENING  Completed     Pneumococcal Vaccine: Pediatrics (0 to 5 Years) and At-Risk Patients (6 to 64 Years)  Aged Out        Subjective:     Chief Complaint: Clif Christie is an 65 y.o. male here for an Annual Wellness visit.     HPI:  The patient is doing well overall. His main concern is of a dry/scaly area to his right temporal scalp. He otherwise has no other health concerns at this time. He states that he has otherwise been in his usual state of health.  Underlying diabetes reviewed and continues Metformin 1000 mg twice daily and glipizide extended release 10 mg twice daily.  Using lisinopril hydrochlorothiazide 20/12.5 daily for hypertension and atorvastatin 40 mg daily for lipid management.  Some tinnitus in right ear described with some associated general hearing decreased not requiring hearing aids.  Has multiple scaly skin lesions and has not had a good skin check by dermatologist previously.  Prior A1c of 7.4% noted June 29, 2020 and has lost 3 pounds since that time however A1c did increase slightly today to 7.8%.  Patient declines flu shots in general.  Denies recent illness.  Has not had Shingrix immunization previously.    Review of Systems:  Please see above.  The rest of the review of systems are negative for all systems.    S.O. \"Johanna\"   1 daughter \"Charice\"   2 dogs - Valery Crouch (both are chihuahuas...)   Dad - dec 62 sudden death, DM   Mom - DM   3 younger bros - Kurtis (DM), Billy, and Will   [employer]   < 1/2 ppd - quit 12/08 (occasional cheat with cigarette...) - quit 3/13/13 again   Infrequent EtOH   No surgeries   No hospitalizations     Patient Care Team:  Mark Lin MD as PCP - General (Family Medicine)  Mark Lin MD as Assigned PCP     Patient Active Problem List   Diagnosis     Dupuytren's Contracture     Type 2 diabetes mellitus with retinopathy, without long-term current use of insulin, macular edema " presence unspecified, unspecified laterality, unspecified retinopathy severity (H)     Hyperlipidemia     Obesity     Essential Hypertension     Tubular adenoma of colon     Cataracts, bilateral     Vitreous floaters of both eyes     Vitamin D deficiency     Asymmetrical sensorineural hearing loss     Stage 3a chronic kidney disease     Past Medical History:   Diagnosis Date     Diabetes mellitus (H)      Hypertension       No past surgical history on file.   Family History   Problem Relation Age of Onset     Diabetes Mother      Diabetes Father      Diabetes Brother       Social History     Socioeconomic History     Marital status: Single     Spouse name: Not on file     Number of children: Not on file     Years of education: Not on file     Highest education level: Not on file   Occupational History     Not on file   Social Needs     Financial resource strain: Not on file     Food insecurity     Worry: Not on file     Inability: Not on file     Transportation needs     Medical: Not on file     Non-medical: Not on file   Tobacco Use     Smoking status: Light Tobacco Smoker     Types: Cigarettes     Smokeless tobacco: Never Used   Substance and Sexual Activity     Alcohol use: Yes     Comment: rare     Drug use: No     Sexual activity: Yes     Partners: Female   Lifestyle     Physical activity     Days per week: Not on file     Minutes per session: Not on file     Stress: Not on file   Relationships     Social connections     Talks on phone: Not on file     Gets together: Not on file     Attends [employer] service: Not on file     Active member of club or organization: Not on file     Attends meetings of clubs or organizations: Not on file     Relationship status: Not on file     Intimate partner violence     Fear of current or ex partner: Not on file     Emotionally abused: Not on file     Physically abused: Not on file     Forced sexual activity: Not on file   Other Topics Concern     Not on file   Social History  "Narrative     Not on file      Current Outpatient Medications   Medication Sig Dispense Refill     aspirin 81 MG EC tablet Take 81 mg by mouth daily.       atorvastatin (LIPITOR) 40 MG tablet Take 1 tablet (40 mg total) by mouth daily. 90 tablet 3     blood glucose test (GLUCOSE BLOOD) strips Use 1 each As Directed daily. Accu-Chek Palma in vitro strips.  Test once a day 50 strip 3     cholecalciferol, vitamin D3, (VITAMIN D3) 2,000 unit capsule Take 1,000 Units by mouth daily.       glipiZIDE (GLUCOTROL XL) 10 MG 24 hr tablet Take 1 tablet (10 mg total) by mouth 2 (two) times a day. 180 tablet 1     lisinopriL-hydrochlorothiazide (PRINZIDE,ZESTORETIC) 20-12.5 mg per tablet TAKE 1 TABLET BY MOUTH EVERY DAY 90 tablet 3     metFORMIN (GLUCOPHAGE) 1000 MG tablet Take 1 tablet (1,000 mg total) by mouth 2 (two) times a day with meals. 180 tablet 1     No current facility-administered medications for this visit.       Objective:   Vital Signs:   Visit Vitals  /60   Pulse 93   Temp (!) 96.4  F (35.8  C)   Ht 5' 7.25\" (1.708 m)   Wt 209 lb (94.8 kg)   SpO2 93%   BMI 32.49 kg/m           VisionScreening:  No exam data present     PHYSICAL EXAM    General Appearance:    Alert, cooperative, no distress, appears stated age.  BMI = 32.49.   Head:    Normocephalic, without obvious abnormality, atraumatic   Eyes:    PERRL, conjunctiva/corneas clear, EOM's intact, fundi     benign, both eyes        Ears:    Normal TM's and external ear canals, both ears   Nose:   Nares normal, septum midline, mucosa normal, no drainage    or sinus tenderness   Throat:   Lips, mucosa, and tongue normal; teeth and gums normal   Neck:   Supple, symmetrical, trachea midline, no adenopathy;        thyroid:  No enlargement/tenderness/nodules; no carotid    bruit or JVD   Back:     Symmetric, no curvature, ROM normal, no CVA tenderness   Lungs:     Clear to auscultation bilaterally, respirations unlabored   Chest wall:    No tenderness or deformity "   Heart:    Regular rate and rhythm, S1 and S2 normal, no murmur, rub   or gallop   Abdomen:     Soft, non-tender, bowel sounds active all four quadrants,     no masses, no organomegaly.     Genitalia:    Normal male without lesion, discharge or tenderness.  No inguinal hernia noted.     Rectal:    Normal tone.  Prostate normal/symmetric, no masses or tenderness.   Extremities:   Extremities normal, atraumatic, no cyanosis or edema   Pulses:   2+ and symmetric all extremities   Skin:   Skin color, texture, turgor normal, no rashes.  Some mild hyperkeratotic lesions on bilateral shoulders.  Seborrheic keratosis likely right temporal region at hairline.  Small skin tags present.   Lymph nodes:   Cervical, supraclavicular, and axillary nodes normal   Dermatology   Right temporal area with dry/scaly lesion consistent with seborrheic keratosis.    Neurologic:   CNII-XII intact. Normal strength, sensation and reflexes       throughout.  Monofilament testing today was normal.          Assessment Results 12/22/2020   Activities of Daily Living No help needed   Instrumental Activities of Daily Living No help needed   Get Up and Go Score Less than 12 seconds   Mini Cog Total Score 4   Some recent data might be hidden     A Mini-Cog score of 0-2 suggests the possibility of dementia, score of 3-5 suggests no dementia    Identified Health Risks:     He is at risk for lack of exercise and has been provided with information to increase physical activity for the benefit of his well-being.  The patient was counseled and encouraged to consider modifying their diet and eating habits. He was provided with information on recommended healthy diet options.  The patient reports that he does not have all recommended working emergency equipment available. He was provided with information about emergency preparedness, including smoke detectors.  The patient was provided with written information regarding signs of hearing loss.  Information  regarding advance directives (living [place]), including where he can download the appropriate form, was provided to the patient via the AVS.

## 2021-06-14 NOTE — TELEPHONE ENCOUNTER
Reason contacted:  Medication problem  Information relayed:    Spoke with patient who states he needs accu-chek guide test strips. A prscription was sent to the pharmacy for the accu-chek yandel test strips but this is not covered by insurance.     Patient states he receives 50 test strips at a time and typically checks his blood sugar 1-2 times weekly.      Patient would like to be contacted once his test strips have been sent to the pharmacy.   Additional questions:  No  Further follow-up needed:  Yes  Okay to leave a detailed message:  No

## 2021-06-15 ENCOUNTER — RECORDS - HEALTHEAST (OUTPATIENT)
Dept: ADMINISTRATIVE | Facility: OTHER | Age: 66
End: 2021-06-15

## 2021-06-15 LAB — RETINOPATHY: NEGATIVE

## 2021-06-16 PROBLEM — H26.9 CATARACTS, BILATERAL: Status: ACTIVE | Noted: 2017-11-07

## 2021-06-16 PROBLEM — E55.9 VITAMIN D DEFICIENCY: Status: ACTIVE | Noted: 2019-11-15

## 2021-06-16 PROBLEM — H90.3 ASYMMETRICAL SENSORINEURAL HEARING LOSS: Status: ACTIVE | Noted: 2019-11-15

## 2021-06-16 PROBLEM — H43.393 VITREOUS FLOATERS OF BOTH EYES: Status: ACTIVE | Noted: 2019-11-15

## 2021-06-16 PROBLEM — N18.31 STAGE 3A CHRONIC KIDNEY DISEASE (H): Status: ACTIVE | Noted: 2020-12-22

## 2021-06-16 NOTE — PROGRESS NOTES
Assessment/Plan:    1. Type 2 diabetes mellitus  Type 2 diabetes fair control.  A1c did increase from 7.5% to 7.8%.  Patient declines medication adjustment and elects dietary and exercise modification for weight goal less than 200 and pounds ideally.  Continued use of metformin 1000 mg twice daily and glipizide extended-release 10 mg twice daily.  Reassess at follow-up in 3-4 months.  Microalbumin screen to be completed.  - Glycosylated Hemoglobin A1c  - Microalbumin, Random Urine  - Basic Metabolic Panel    2. Essential hypertension  Hypertension stable.  Continue lisinopril hydrochlorothiazide 20/12.5 daily.    3. Hyperlipidemia, unspecified hyperlipidemia type  Hyperlipidemia, stable.  Continue atorvastatin 40 mg daily with dietary and exercise modifications recommended as noted above.    4. Diabetic retinopathy of both eyes associated with type 2 diabetes mellitus, macular edema presence unspecified, unspecified retinopathy severity  Patient has follow-up with [facility] in 2 weeks to confirm cataract and/or retinopathy status.    5. Left nephrolithiasis  Recent left sided nephrolithiasis with passage of calcium oxalate monohydrate stone December 5, 2017 measuring 2 mm.  CT scan did show bilateral fat-containing inguinal hernias.    6. Tubular adenoma of colon  Tubular adenoma history with colonoscopy August 31, 2016 to be repeated at 3 year interval.    7. CKD (chronic kidney disease) stage 3, GFR 30-59 ml/min  CKD stage IIIa.  Repeat basic metabolic panel to ensure stable renal function and glycemic control.  - Basic Metabolic Panel            Subjective:    Clif Christie is seen today for follow-up evaluation.  Type 2 diabetes.  Metformin 1000 mg twice daily glipizide extended-release 10 mg twice daily.  Continues daily aspirin.  Lisinopril hydrochlorothiazide 20/12.5 using 1 tablet daily for hypertension.  Atorvastatin 40 mg daily for lipid management.  Has lost 3 pounds since July 2017 however  "not consistent with exercise and does have some dietary indiscretions.  History of tubular adenoma on colonoscopy August 31, 2016 told her repeat 3 year interval.  Past 2 mm calcium oxalate monohydrate stone December 5, 2017 without residual concerns.  Apprehensive review of systems as above otherwise all negative.    S.O. 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 No pertinent surgical history.     Family History   Problem Relation Age of Onset     Diabetes Mother      Diabetes Father      Diabetes Brother         Past Medical History:   Diagnosis Date     Diabetes mellitus      Hypertension         Social History   Substance Use Topics     Smoking status: Light Tobacco Smoker     Types: Cigarettes     Smokeless tobacco: Never Used     Alcohol use Yes      Comment: rare        Current Outpatient Prescriptions   Medication Sig Dispense Refill     aspirin 81 MG EC tablet Take 81 mg by mouth daily.       atorvastatin (LIPITOR) 40 MG tablet TAKE ONE TABLET BY MOUTH ONE TIME DAILY 90 tablet 2     blood sugar diagnostic (GLUCOSE BLOOD) Strp Accu-Chek Palma in vitro strips.  Test twice daily.       glipiZIDE (GLUCOTROL XL) 10 MG 24 hr tablet TAKE ONE TABLET BY MOUTH TWICE DAILY 180 tablet 2     ibuprofen (ADVIL,MOTRIN) 400 MG tablet Take 1 tablet (400 mg total) by mouth every 6 (six) hours as needed for pain. 15 tablet 0     lisinopril-hydrochlorothiazide (PRINZIDE,ZESTORETIC) 20-12.5 mg per tablet TAKE ONE TABLET BY MOUTH ONE TIME DAILY 90 tablet 2     metFORMIN (GLUCOPHAGE) 1000 MG tablet TAKE ONE TABLET BY MOUTH TWICE A DAY WITH MEALS 180 tablet 2     No current facility-administered " medications for this visit.           Objective:    Vitals:    03/09/18 0802   BP: 100/60   Pulse: 80   Weight: 217 lb (98.4 kg)      Body mass index is 32.99 kg/(m^2).    Alert.  No apparent distress with chest clear.  Cardiac exam regular.  Blood pressure at goal.  BMI 32.99.  Extremities warm and dry.         CT ABDOMEN PELVIS WO ORAL WO IV CONTRAST  12/5/2017 8:19 PM     INDICATION: pain  TECHNIQUE: Routine CT abdomen and pelvis without oral or IV contrast. Multiplanar reformation images (MPR). Dose reduction techniques were used.  COMPARISON: None.     FINDINGS:  LUNG BASES: Negative.     ABDOMEN: Noncontrast liver, gallbladder, bile ducts, spleen, pancreas, adrenal glands, and right kidney are negative. 2 mm stone in the proximal to mid left ureter causes mild hydronephrosis and inflammatory changes of the left kidney.     PELVIS: Decompressed urinary bladder. Moderate-sized bilateral fat-containing inguinal hernias. Normal appendix. No dilated loops of bowel.     MUSCULOSKELETAL: Negative.     IMPRESSION:   CONCLUSION:  1.  2 mm stone in the proximal to mid left ureter causes mild hydronephrosis and inflammatory changes of the left kidney.  2.  Bilateral fat-containing inguinal hernias.

## 2021-06-16 NOTE — PROGRESS NOTES
Assessment/Plan:    1. Type 2 diabetes mellitus with retinopathy, without long-term current use of insulin, macular edema presence unspecified, unspecified laterality, unspecified retinopathy severity (H)  Type 2 diabetes, stable.  A1c improving slightly from 7.8% to 7.7% and will continue attempts at weight loss.  Patient wants to achieve weight under 205 pounds from current 207 pounds.  Goal less than 200 pounds initially, less than 190 pounds ideally reviewed.  Microalbumin screen remains normal August 21, 2020.  Had diabetic eye exam June 13, 2020 has sepsis coming follow-up exam Loly 15, 2021.  Monofilament testing today was normal.  Continues use of Metformin 1000 mg twice daily and glipizide XL 10 mg twice daily.  Is on aspirin 81 mg daily as well.  Diabetes follow-up in 4 months.  - Glycosylated Hemoglobin A1c  - CMP    2. Essential hypertension  Hypertension remains well controlled using lisinopril hydrochlorothiazide 20/12.5 daily.  Med monitoring completed today.    3. Mixed hyperlipidemia  History of hyperlipidemia.  Continues atorvastatin 40 mg daily.  Repeat cholesterol panel today with therapeutic lifestyle changes reviewed as noted above.  - lipid cascade    4. Stage 3a chronic kidney disease  History of CKD stage IIIa.  Ensure adequate hydration.  Lab monitoring.    Patient scheduled this morning for COVID-19 Pfizer immunization to begin series.    The following high BMI interventions were performed this visit: encouragement to exercise, weight monitoring, weight loss from baseline weight and lifestyle education regarding diet .  Ensure ongoing efforts to achieve weight goal < 200 pounds initially, < 190 pounds ideally.         Subjective:    SarwatHEBERT Christie is seen today for follow-up assessment type 2 diabetes.  Prior A1c of 7.8%.  Using Metformin 1000 mg twice daily glipizide XL 10 mg twice daily.  Aspirin 81 mg daily continues as well.  Prior microalbumin screen remains negative.  No history  "of diabetic retinopathy.  Did have tubular adenoma on prior colonoscopy with most recent colonoscopy September 26, 2019 with hyperplastic polyp otherwise recommendation for ongoing 5-year follow-up.  Atorvastatin 40 mg daily for lipid management.  History of CKD stage IIIa prior creatinine 1.3 and GFR 55.  Patient hoping to achieve weight less than 205 pounds initially.  Has cut out more sweets from his diet.  Comprehensive review of systems as above otherwise all negative.    S.O. 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 No pertinent surgical history.     Family History   Problem Relation Age of Onset     Diabetes Mother      Diabetes Father      Diabetes Brother         Past Medical History:   Diagnosis Date     Diabetes mellitus (H)      Hypertension         Social History     Tobacco Use     Smoking status: Light Tobacco Smoker     Types: Cigarettes     Smokeless tobacco: Never Used   Substance Use Topics     Alcohol use: Yes     Comment: rare     Drug use: No        Current Outpatient Medications   Medication Sig Dispense Refill     aspirin 81 MG EC tablet Take 81 mg by mouth daily.       atorvastatin (LIPITOR) 40 MG tablet Take 1 tablet (40 mg total) by mouth daily. 90 tablet 3     blood glucose test (ACCU-CHEK GUIDE TEST STRIPS) strips Use 1 each As Directed daily. Dispense brand per patient's insurance at pharmacy discretion. 50 strip 3     cholecalciferol, vitamin D3, (VITAMIN D3) 2,000 unit capsule Take 1,000 Units by mouth daily.       glipiZIDE (GLUCOTROL XL) 10 MG 24 hr tablet Take 1 tablet (10 mg total) by mouth 2 (two) times a day. 180 tablet 1     " lisinopriL-hydrochlorothiazide (PRINZIDE,ZESTORETIC) 20-12.5 mg per tablet TAKE 1 TABLET BY MOUTH EVERY DAY 90 tablet 3     metFORMIN (GLUCOPHAGE) 1000 MG tablet Take 1 tablet (1,000 mg total) by mouth 2 (two) times a day with meals. 180 tablet 1     No current facility-administered medications for this visit.           Objective:    Vitals:    04/22/21 0915   BP: 100/60   Pulse: 95   SpO2: 96%   Weight: 207 lb (93.9 kg)      Body mass index is 32.18 kg/m .    Alert.  No apparent distress.  Chest clear.  Cardiac exam regular.  Monofilament testing normal.  Dorsalis pedis pulses palpable bilateral symmetric otherwise somewhat diminished posterior tibial pulses.  No ankle edema.  No rash.      This note has been dictated using voice recognition software and as a result may contain minor grammatical errors and unintended word substitutions.

## 2021-06-18 NOTE — PROGRESS NOTES
Assessment/Plan:    1. Type 2 diabetes mellitus with stage 3 chronic kidney disease, without long-term current use of insulin  Type 2 diabetes, stable with A1c improving slightly from 7.8% to 7.5%.  Continues metformin 1000 mg twice daily and glipizide extended-release 10 mg twice daily.  Diabetic eye exam March 24, 2018 did not show evidence for retinopathy.  Patient does have annual eye exams to monitor.  Microalbumin screen normal March 9, 2018.  Monofilament testing today again remains normal with good circulation.  Diabetes recheck at physical exam in 4 months anticipated.    2. Essential hypertension  Hypertension remained stable on lisinopril hydrochlorothiazide 20/12.5 using 1 tablet daily as well as renal protective benefits with ACE inhibitor.    3. Hyperlipidemia, unspecified hyperlipidemia type  Remains on atorvastatin 40 mg daily for hyperlipidemia.    4. CKD (chronic kidney disease) stage 3, GFR 30-59 ml/min  CKD stage IIIa with recent creatinine 1.37 and GFR 52.  Ensure adequate hydration.  Continue ACE inhibitor for renal protective effects.  We will continue to follow closely.    5. Tubular adenoma of colon  Prior tubular adenoma on colonoscopy August 31, 2016 told her repeat 3 year interval.    6. Left nephrolithiasis  History of left-sided nephrolithiasis with 2 mm proximal to mid left ureteral stone on CT December 5, 2017, resolved without residual concerns.  Recent urinalysis June 18, 2018 without hematuria.    7. 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  Will help to assist in arranging appointment for further evaluation.    The following high BMI interventions were performed this visit: encouragement to exercise, weight monitoring, weight loss from baseline weight and lifestyle education regarding diet.  Ensure ongoing efforts to achieve weight goal < 210 pounds initially, < 200 pounds ideally.  "        Subjective:    Clif Christie is seen today for follow-up evaluation.  Type 2 diabetes.  History of chronic kidney disease stage IIIa.  Continues metformin 1000 mg twice daily and glipizide extended-release 10 mg twice daily.  Atorvastatin 40 mg daily for lipid management.  Lisinopril hydrochlorothiazide 20/12.5 using 1 tablet daily for hypertension.  History of 2 mm proximal to mid left ureteral stone on CT December 5, 2017 resolved without further concerns or recurrent issues with nephrolithiasis.  Does not require refills at this time.  Comprehensive review of systems as above otherwise all negative.  Recent diabetic eye exam without evidence for retinopathy.  Prior colonoscopy August 31, 2016 with tubular adenoma told her repeat 3 year interval.    S.O. 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     Family History   Problem Relation Age of Onset     Diabetes Mother      Diabetes Father      Diabetes Brother         Past Medical History:   Diagnosis Date     Diabetes mellitus (H)      Hypertension         Social History   Substance Use Topics     Smoking status: Light Tobacco Smoker     Types: Cigarettes     Smokeless tobacco: Never Used     Alcohol use Yes      Comment: rare        Current Outpatient Prescriptions   Medication Sig Dispense Refill     aspirin 81 MG EC tablet Take 81 mg by mouth daily.       atorvastatin (LIPITOR) 40 MG tablet TAKE ONE TABLET BY MOUTH ONE TIME DAILY 90 tablet 2     blood sugar diagnostic (GLUCOSE BLOOD) Strp Accu-Chek [place] in vitro strips.  Test twice daily.       glipiZIDE (GLUCOTROL XL) 10 MG 24 hr tablet TAKE ONE TABLET BY " MOUTH TWICE DAILY 180 tablet 2     lisinopril-hydrochlorothiazide (PRINZIDE,ZESTORETIC) 20-12.5 mg per tablet TAKE ONE TABLET BY MOUTH ONE TIME DAILY 90 tablet 2     metFORMIN (GLUCOPHAGE) 1000 MG tablet TAKE ONE TABLET BY MOUTH TWICE A DAY WITH MEALS 180 tablet 2     No current facility-administered medications for this visit.           Objective:    Vitals:    06/20/18 0756   BP: 110/60   Pulse: 72   Weight: 217 lb (98.4 kg)      Body mass index is 33.49 kg/(m^2).    Alert.  No apparent distress.  Monofilament testing normal bilateral feet.  Good circulation with dorsalis pedis pulse 2/2.  Somewhat diminished posterior tibial pulses symmetric bilateral.  No ankle edema.  No rash.  Cardiac exam regular.      This note has been dictated using voice recognition software and as a result may contain minor grammatical errors and unintended word substitutions.

## 2021-06-18 NOTE — PATIENT INSTRUCTIONS - HE
Patient Instructions by Mike Alexandre at 12/22/2020  8:20 AM     Author: Mike Alexandre Service: -- Author Type: Medical Student    Filed: 12/22/2020  9:47 AM Encounter Date: 12/22/2020 Status: Addendum    : Mark Lin MD (Physician)    Related Notes: Original Note by Mike Alexandre (Medical Student) filed at 12/22/2020  8:55 AM         Patient Education     Exercise for a Healthier Heart  You may wonder how you can improve the health of your heart. If youre thinking about exercise, youre on the right track. You dont need to become an athlete, but you do need a certain amount of brisk exercise to help strengthen your heart. If you have been diagnosed with a heart condition, your doctor may recommend exercise to help stabilize your condition. To help make exercise a habit, choose safe, fun activities.       Be sure to check with your health care provider before starting an exercise program.    Why exercise?  Exercising regularly offers many healthy rewards. It can help you do all of the following:    Improve your blood cholesterol levels to help prevent further heart trouble    Lower your blood pressure to help prevent a stroke or heart attack    Control diabetes, or reduce your risk of getting this disease    Improve your heart and lung function    Reach and maintain a healthy weight    Make your muscles stronger and more limber so you can stay active    Prevent falls and fractures by slowing the loss of bone mass (osteoporosis)    Manage stress better  Exercise tips  Ease into your routine. Set small goals. Then build on them.  Exercise on most days. Aim for a total of 150 or more minutes of moderate to  vigorous intensity activity each week. Consider 40 minutes, 3 to 4 times a week. For best results, activity should last for 40 minutes on average. It is OK to work up to the 40 minute period over time. Examples of moderate-intensity activity is walking one mile in 15 minutes or 30 to 45 minutes  of yard work.  Step up your daily activity level. Along with your exercise program, try being more active throughout the day. Walk instead of drive. Do more household tasks or yard work.  Choose one or more activities you enjoy. Walking is one of the easiest things you can do. You can also try swimming, riding a bike, or taking an exercise class.  Stop exercising and call your doctor if you:    Have chest pain or feel dizzy or lightheaded    Feel burning, tightness, pressure, or heaviness in your chest, neck, shoulders, back, or arms    Have unusual shortness of breath    Have increased joint or muscle pain    Have palpitations or an irregular heartbeat      0873-1252 [employer]. [address] [place] [address], [place] [address]. All rights reserved. This information is not intended as a substitute for professional medical care. Always follow your healthcare professional's instructions.         Patient Education   Understanding [employer] MyPlate  The USDA (US Department of Agriculture) has guidelines to help you make healthy food choices. These are called MyPlate. MyPlate shows the food groups that make up healthy meals using the image of a place setting. Before you eat, think about the healthiest choices for what to put onto your plate or into your cup or bowl. To learn more about building a healthy plate, visit www.choosemyplate.gov.       The Food Groups    Fruits: Any fruit or 100% fruit juice counts as part of the Fruit Group. Fruits may be fresh, canned, frozen, or dried, and may be whole, cut-up, or pureed. Make half your plate fruits and vegetables.    Vegetables: Any vegetable or 100% vegetable juice counts as a member of the Vegetable Group. Vegetables may be fresh, frozen, canned, or dried. They can be served raw or cooked and may be whole, cut-up, or mashed. Make half your plate fruits and vegetables.     Grains: All foods made from grains are part of the Grains Group. These include wheat, rice, oats,  cornmeal, and barley such as bread, pasta, oatmeal, cereal, tortillas, and grits. Grains should be no more than a quarter of your plate. At least half of your grains should be whole grains.    Protein: This group includes meat, poultry, seafood, beans and peas, eggs, processed soy products (like tofu), nuts (including nut butters), and seeds. Make protein choices no more than a quarter of your plate. Meat and poultry choices should be lean or low fat.    Dairy: All fluid milk products and foods made from milk that contain calcium, like yogurt and cheese are part of the Dairy Group. (Foods that have little calcium, such as cream, butter, and cream cheese, are not part of the group.) Most dairy choices should be low-fat or fat-free.    Oils: These are fats that are liquid at room temperature. They include canola, corn, olive, soybean, and sunflower oil. Foods that are mainly oil include mayonnaise, certain salad dressings, and soft margarines. You should have only 5 to 7 teaspoons of oils a day. You probably already get this much from the food you eat.  Use [employer] to Help Build Your Meals  The SuperTracker can help you plan and track your meals and activity. You can look up individual foods to see or compare their nutritional value. You can get guidelines for what and how much you should eat. You can compare your food choices. And you can assess personal physical activities and see ways you can improve. Go to www.chooseSkinkersplate.gov/supertracker/.    9988-2882 [employer]. [address] [place] [address], [place] [address]. All rights reserved. This information is not intended as a substitute for professional medical care. Always follow your healthcare professional's instructions.           Patient Education    Home Fire Safety  Each year, thousands of people, including children, are injured and killed in home fires. Children are often curious about fire, and may not understand the dangers. This makes home  fire safety practices especially important. Three important things you can do to keep your home safe from fire are:    Install smoke alarms in your home and make sure they work properly.    Teach children not to play with matches, lighters, and other materials that can be used to start fires. And keep these materials out of childrens reach.    Teach children what to do in case of fire. Create a fire safety action plan and practice it.  Read on for more details about keeping your family and home safe from fire.        Being Prepared for a Fire  A home fire can happen at any time. The following can help you be prepared:    Install smoke alarms on every level of your home, including the basement and outside all sleeping areas. This simple step cuts your familys risk of dying in a fire nearly in half.    Test smoke alarms monthly, and change the batteries once a year or when the alarm chirps.    Dont disable smoke alarms, even for a short time.    Ask your local fire department for tips on where to place smoke alarms in your home.    Replace all smoke alarms every 10 years.    Consider using voice smoke alarms. These alarms allow you to substitute your own voice for the alarm sound. They are helpful because many children dont wake up to the sound of a regular smoke alarm.    Install carbon monoxide detectors near sleeping areas.    Be aware that carbon monoxide is a byproduct of smoke that can be deadly. Its a gas that you cant see, smell, or taste.    Consider buying a combination smoke alarm / carbon monoxide detector.    Keep fire extinguishers in the home.    Keep them in accessible locations, especially in the kitchen.    Check usage dates to make sure they are not .    Use fire extinguishers only when the fire is in a contained area and is not spreading. (Otherwise, you should focus on getting out of the home.)    Train adults to use fire extinguishers. (Children should focus on getting out of the home  during a fire.)    If you live in an apartment, talk to your landlord about where smoke alarms are and how often they are tested. Also ask about fire extinguisher locations and emergency exit routes.  Indoor Fire Safety  Many things in your home are potential fire hazards. Follow these steps to help keep your home safe.    Be careful in the kitchen.    Never leave food thats cooking unattended.    If a fire breaks out in a cooking pan, put a lid on it to smother it. And never throw water on a grease fire. It will make the fire worse.    Conduct a home safety inspection. Look for anything, such as frayed wires and cords, that can cause a fire. Fix or remove any fire safety hazards you find.    Keep all matches and lighters in a secured drawer or cabinet out of the reach of children. Use childproof lighters.    Check to make sure all appliances, including the stove, are turned off before leaving the home.    Know where the gas main shut-off is located.    Make sure space heaters are stable and have protective covers. Keep them at least 3 feet from anything that can burn, such as curtains. Dont use space heaters in areas where young kids spend time alone.    Keep flammable liquids such as kerosene and gasoline locked up and safely stored away from kids and heat.    Keep all smoking materials out of reach of children. And never smoke in bed. If possible, smoke outdoors only.  Outdoor Fire Safety  Fire can be a hazard outdoors as well as indoors. When outdoors, be sure to do the following:    Always supervise kids near a barbecue grill, campfire, or portable stove.    Dont use fire pits around children. Kids can fall into them, and pits can be hot even after the fire goes out.    Keep a garden hose or fire extinguisher handy when cooking outdoors in case of fire.  Teaching Your Child About Fire Safety  One of the best ways to keep your home safe from fire is education. Make sure everyone in your family knows fire safety  rules, including children.    Teach your children the dangers of matches, lighters, and other dangerous items.    Teach them to never touch these and other objects that are hot, such as candles.    Have them tell you right away whenever they find matches or lighters. Explain that these items are tools for grown-ups, not toys. And never amuse children with matches or lighters.    Round up all matches and lighters and store them safely. In case you missed some, ask your children to tell you where any are located throughout your home.    Never leave a child alone in a room with a lit candle. Dont allow teens to have candles in their rooms.    Show children what to do in case of fire.    Be sure your kids know what the fire alarm sounds like and what to do if it goes off.    Teach kids what to do if their clothes catch fire: Stop, Drop to the ground, and Roll until the fire is put out. They should also cover their face with their hands. Practice these steps with your children. Make sure they understand that running will make the fire burn faster.    Show children how to crawl below smoke during a fire.    Make sure kids know at least two escape routes from each room in the home. These escape routes can be windows.    Teach kids to test doors for nearby fire by feeling for heat with the back of their hand. If the door is warm or hot, they should try their second exit.    Explain to children that they cant hide from a fire. Hiding in a closet or under a bed wont make them safe. Instead, they should try to escape the home. And if they cant escape, they should let others know they are trapped. They can do this by shutting the door to the room, opening a window, and turning on the lights.    Talk to your local fire department.    Introduce your children to a . Let them know that firefighters will look different when in full protective gear. Tell them to never hide from firefighters, and to follow all directions  from firefighters during a fire.    Find out if the fire department has a fire safety program for kids.      Create a Fire Safety Plan  Create a plan for your family to follow in case of a fire. Try making it a family project. Important steps for the plan include leaving the home right away and having a designated meeting place.    Make sure your child understands to get out and stay out. He or she should get out of the home immediately and not go back in, even if family members or pets are still inside.    Decide on a safe meeting place away from the home for everyone to gather.    Teach children to call 911 or emergency services from a cell phone or neighbors phone. Make sure they know to do this only after they are safely out of the home.    Teach your children the fire safety plan. Practice it and make sure they understand it.    Have fire drills twice a year to keep your children prepared in case of fire.    Visit the National Fire Protection Association web site at www.nfpa.org for more information.      3064-0256 [employer]. [address] [place] [address]. All rights reserved. This information is not intended as a substitute for professional medical care. Always follow your healthcare professional's instructions.         Patient Education   Signs of Hearing Loss  Hearing loss is a problem shared by many people. In fact, it is one of the most common health conditions, particularly as people age. Most people over age 65 have some hearing loss, and by age 80, almost everyone does. Because hearing loss usually occurs slowly over the years, you may not realize your hearing ability has gotten worse.       Have your hearing checked  Contact your [facility] care provider if you:    Have to strain to hear normal conversation.    Have to watch other peoples faces very carefully to follow what theyre saying.    Need to ask people to repeat what theyve said.    Often misunderstand what people are  saying.    Turn the volume of the television or radio up so high that others complain.    Feel that people are mumbling when theyre talking to you.    Find that the effort to hear leaves you feeling tired and irritated.    Notice, when using the phone, that you hear better with 1 ear than the other.    5850-6394 [employer]. [address] [place] [address], [place] [address]. All rights reserved. This information is not intended as a substitute for professional medical care. Always follow your healthcare professional's instructions.         Patient Education   Understanding Advance Care Planning  Advance care planning is the process of deciding ones own future medical care. It helps ensure that if you cant speak for yourself, your wishes can still be carried out. The plan is a series of legal documents that note a persons wishes. The documents vary by state. Advance care planning may be done when a person has a serious illness that is expected to get worse. It may be done before major surgery. And it can help you and your family be prepared in case of a major illness or injury. Advance care planning helps with making decisions at these times.       A health care proxy is a person who acts as the voice of a patient when the patient cant speak for himself or herself. The name of this role varies by state. It may be called a Durable Medical Power of  or Durable Power of  for Healthcare. It may be called an agent, surrogate, or advocate. Or it may be called a representative or decision maker. It is an official duty that is identified by a legal document. The document also varies by state.    Why Is Advance Care Planning Important?  If a person communicates their healthcare wishes:    They will be given medical care that matches their values and goals.    Their family members will not be forced to make decisions in a crisis with no guidance.  Creating a Plan  Making an advance care plan is often done  in 3 steps:    Thinking about ones wishes. To create an advance care plan, you should think about what kind of medical treatment you would want if you lose the ability to communicate. Are there any situations in which you would refuse or stop treatment? Are there therapies you would want or not want? And whom do you want to make decisions for you? There are many places to learn more about how to plan for your care. Ask your doctor or  for resources.    Picking a health care proxy. This means choosing a trusted person to speak for you only when you cant speak for yourself. When you cannot make medical decisions, your proxy makes sure the instructions in your advance care plan are followed. A proxy does not make decisions based on his or her own opinions. They must put aside those opinions and values if needed, and carry out your wishes.    Filling out the legal documents. There are several kinds of legal documents for advance care planning. Each one tells health care providers your wishes. The documents may vary by state. They must be signed and may need to be witnessed or notarized. You can cancel or change them whenever you wish. Depending on your state, the documents may include a Healthcare Proxy form, Living Will, Durable Medical Power of , Advance Directive, or others.  The Familys Role  The best help a family can give is to support their loved ones wishes. Open and honest communication is vital. Family should express any concerns they have about the patients choices while the patient can still make decisions.    9162-3340 [employer]. [address] [place] [address]. All rights reserved. This information is not intended as a substitute for professional medical care. Always follow your healthcare professional's instructions.         Also, [facility] [place] offers a free, downloadable health care directive that allows you to share your treatment choices and  personal preferences if you cannot communicate your wishes. It also allows you to appoint another person (called a health care agent) to make health care decisions if you are unable to do so. You can download an advance directive by going here: [email or web address]     Patient Education   Personalized Prevention Plan  You are due for the preventive services outlined below.  Your care team is available to assist you in scheduling these services.  If you have already completed any of these items, please share that information with your care team to update in your medical record.  Health Maintenance   Topic Date Due   ? ZOSTER VACCINES (2 of 3) 08/30/2017   ? INFLUENZA VACCINE RULE BASED (1) 08/01/2020   ? MEDICARE ANNUAL WELLNESS VISIT  11/15/2020   ? Pneumococcal Vaccine: 65+ Years (2 of 2 - PPSV23) 11/15/2020   ? DIABETIC FOOT EXAM  11/15/2020   ? DIABETIC EYE EXAM  06/13/2021   ? A1C  06/22/2021   ? BMP  08/21/2021   ? LIPID  08/21/2021   ? MICROALBUMIN  08/21/2021   ? FALL RISK ASSESSMENT  12/22/2021   ? COLORECTAL CANCER SCREENING  09/26/2024   ? ADVANCE CARE PLANNING  11/15/2024   ? TD 18+ HE  07/05/2027   ? HEPATITIS C SCREENING  Completed   ? Pneumococcal Vaccine: Pediatrics (0 to 5 Years) and At-Risk Patients (6 to 64 Years)  Aged Out        Patient Education     Exercise for a Healthier Heart  You may wonder how you can improve the health of your heart. If youre thinking about exercise, youre on the right track. You dont need to become an athlete, but you do need a certain amount of brisk exercise to help strengthen your heart. If you have been diagnosed with a heart condition, your doctor may recommend exercise to help stabilize your condition. To help make exercise a habit, choose safe, fun activities.       Be sure to check with your health care provider before starting an exercise program.    Why exercise?  Exercising regularly offers many healthy rewards. It can help you do  all of the following:    Improve your blood cholesterol levels to help prevent further heart trouble    Lower your blood pressure to help prevent a stroke or heart attack    Control diabetes, or reduce your risk of getting this disease    Improve your heart and lung function    Reach and maintain a healthy weight    Make your muscles stronger and more limber so you can stay active    Prevent falls and fractures by slowing the loss of bone mass (osteoporosis)    Manage stress better  Exercise tips  Ease into your routine. Set small goals. Then build on them.  Exercise on most days. Aim for a total of 150 or more minutes of moderate to  vigorous intensity activity each week. Consider 40 minutes, 3 to 4 times a week. For best results, activity should last for 40 minutes on average. It is OK to work up to the 40 minute period over time. Examples of moderate-intensity activity is walking one mile in 15 minutes or 30 to 45 minutes of yard work.  Step up your daily activity level. Along with your exercise program, try being more active throughout the day. Walk instead of drive. Do more household tasks or yard work.  Choose one or more activities you enjoy. Walking is one of the easiest things you can do. You can also try swimming, riding a bike, or taking an exercise class.  Stop exercising and call your doctor if you:    Have chest pain or feel dizzy or lightheaded    Feel burning, tightness, pressure, or heaviness in your chest, neck, shoulders, back, or arms    Have unusual shortness of breath    Have increased joint or muscle pain    Have palpitations or an irregular heartbeat      6193-1886 [employer]. [address] [place], [place] [address]. All rights reserved. This information is not intended as a substitute for professional medical care. Always follow your healthcare professional's instructions.         Patient Education   Understanding USDA MyPlate  The USDA (US Department of Agriculture) has  guidelines to help you make healthy food choices. These are called MyPlate. MyPlate shows the food groups that make up healthy meals using the image of a place setting. Before you eat, think about the healthiest choices for what to put onto your plate or into your cup or bowl. To learn more about building a healthy plate, visit www.choosemyplate.gov.       The Food Groups    Fruits: Any fruit or 100% fruit juice counts as part of the Fruit Group. Fruits may be fresh, canned, frozen, or dried, and may be whole, cut-up, or pureed. Make half your plate fruits and vegetables.    Vegetables: Any vegetable or 100% vegetable juice counts as a member of the Vegetable Group. Vegetables may be fresh, frozen, canned, or dried. They can be served raw or cooked and may be whole, cut-up, or mashed. Make half your plate fruits and vegetables.     Grains: All foods made from grains are part of the Grains Group. These include wheat, rice, oats, cornmeal, and barley such as bread, pasta, oatmeal, cereal, tortillas, and grits. Grains should be no more than a quarter of your plate. At least half of your grains should be whole grains.    Protein: This group includes meat, poultry, seafood, beans and peas, eggs, processed soy products (like tofu), nuts (including nut butters), and seeds. Make protein choices no more than a quarter of your plate. Meat and poultry choices should be lean or low fat.    Dairy: All fluid milk products and foods made from milk that contain calcium, like yogurt and cheese are part of the Dairy Group. (Foods that have little calcium, such as cream, butter, and cream cheese, are not part of the group.) Most dairy choices should be low-fat or fat-free.    Oils: These are fats that are liquid at room temperature. They include canola, corn, olive, soybean, and sunflower oil. Foods that are mainly oil include mayonnaise, certain salad dressings, and soft margarines. You should have only 5 to 7 teaspoons of oils a  day. You probably already get this much from the food you eat.  Use [employer] to Help Build Your Meals  The SuperTracker can help you plan and track your meals and activity. You can look up individual foods to see or compare their nutritional value. You can get guidelines for what and how much you should eat. You can compare your food choices. And you can assess personal physical activities and see ways you can improve. Go to www.[employer].gov/supertracker/.    0118-1929 [employer]. [address] [place] [address], [place] [address]. All rights reserved. This information is not intended as a substitute for professional medical care. Always follow your healthcare professional's instructions.           Patient Education    Home Fire Safety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

## 2021-06-18 NOTE — LETTER
Letter by Mark Lin MD at      Author: Mark Lin MD Service: -- Author Type: --    Filed:  Encounter Date: 2/27/2019 Status: (Other)       Clif Christie  [address] [place] [address]             February 27, 2019         Dear Mr. Christie,    Below are the results from your recent visit:    Resulted Orders   Glycosylated Hemoglobin A1c   Result Value Ref Range    Hemoglobin A1c 7.1 (H) 3.5 - 6.0 %   Basic Metabolic Panel   Result Value Ref Range    Sodium 141 136 - 145 mmol/L    Potassium 4.5 3.5 - 5.0 mmol/L    Chloride 103 98 - 107 mmol/L    CO2 25 22 - 31 mmol/L    Anion Gap, Calculation 13 5 - 18 mmol/L    Glucose 197 (H) 70 - 125 mg/dL    Calcium 9.6 8.5 - 10.5 mg/dL    BUN 20 8 - 22 mg/dL    Creatinine 1.30 0.70 - 1.30 mg/dL    GFR MDRD Af Amer >60 >60 mL/min/1.73m2    GFR MDRD Non Af Amer 56 (L) >60 mL/min/1.73m2    Narrative    Fasting Glucose reference range is 70-99 mg/dL per  American Diabetes Association (ADA) guidelines.   Vitamin D, Total (25-Hydroxy)   Result Value Ref Range    Vitamin D, Total (25-Hydroxy) 54.2 30.0 - 80.0 ng/mL    Narrative    Deficiency <10.0 ng/mL  Insufficiency 10.0-29.9 ng/mL  Sufficiency 30.0-80.0 ng/mL  Toxicity (possible) >100.0 ng/mL       Continue your current diabetes management as discussed in order to further improve.  Goal A1c remains < 7.0% ideally.     Your kidney function is mildly reduced.  Ensure adequate hydration.  We will continue to follow closely.     Your vitamin D level is now normal.  Continue your current management as discussed.     Please call with questions or contact us using [email or web address].    Sincerely,        Electronically signed by Mark Lin MD        Pt mario Pinon called.

## 2021-06-18 NOTE — PROGRESS NOTES
Medical Examination      Assessment:      Healthy male exam.   Meets standards, but periodic monitoring required due to HTN, Type 2 diabetes.   qualified only for 1 year.      Plan:        Medical examiners certificate completed and printed.  Return as needed.   He will follow-up with primary care provider later this week for diabetic check    Referral placed to ENT for hearing aid evaluation as recommended by audiologist         Subjective:      Clif Christie is a 62 y.o. male who presents today for a  fitness determination physical exam. The patient reports no problems.  He has a class D license.  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  Followed by primary care provider for management of hypertension, type 2 diabetes and hyperlipidemia.  Scheduled for diabetic check with PCP later this week.  Overall feels well.  Reviewed medical history.  Denies concerns with dizziness and lightheadedness.  No chest pain or dyspnea.  No lower extremity edema.  Reports good control of blood sugars.  No episodes of hypoglycemia.  No adverse side effects associated with his medications.  He has chronic tinnitus and decreased hearing in right ear.  History of prior work injury about 30 years ago in which he lost the tip of his right thumb.  He has yearly eye exams with his ophthalmologist.  Last exam was in March.  He does have early cataracts as well as some mild background diabetic retinopathy for which monitoring but no treatment is required.  He did have audiology examination last October.  This showed asymmetrical hearing loss.  He was felt to be candidate for hearing aids.  Yearly audiology examination was recommended as well as consultation with ENT.      The following portions of the patient's history were reviewed and updated as appropriate: allergies, current medications, past family history, past medical history, past social history, past surgical history and problem list.  Review of  "Systems  Pertinent items are noted in HPI.     Objective:      Vision:   Uncorrected Corrected Horizontal Field of Vision   Right Eye 20/32-1  >90 degrees   Left Eye  20/50  >90 degrees   Both Eyes  20/32-1       Had eye exam 3/24/2018.  Results reviewed.  Visual acuity was 20/30 in both eyes.    Applicant can recognize and distinguish among traffic control signals and devices showing standard red, green, and gilson colors.         Monocular Vision?: No      Hearing:    Able to hear forced whisper at greater than or equal to 5 feet in both ears.   Audiology exam performed October. 2017       /65 (Patient Site: Right Arm, Patient Position: Sitting, Cuff Size: Adult Large)  Pulse 86  Temp 97.9  F (36.6  C) (Tympanic)   Ht 5' 7.5\" (1.715 m)  Wt 217 lb 9 oz (98.7 kg)  SpO2 95%  BMI 33.57 kg/m2  General appearance: alert, appears stated age and cooperative  Head: Normocephalic, without obvious abnormality, atraumatic  Eyes: conjunctivae/corneas clear. PERRL, EOM's intact. Fundi benign.  Ears: normal TM's and external ear canals both ears  Nose: Nares normal. Septum midline. Mucosa normal. No drainage or sinus tenderness.  Throat: lips, mucosa, and tongue normal; teeth and gums normal  Neck: no adenopathy, no carotid bruit, no JVD, supple, symmetrical, trachea midline and thyroid not enlarged, symmetric, no tenderness/mass/nodules  Back: symmetric, no curvature. ROM normal. No CVA tenderness.  Lungs: clear to auscultation bilaterally  Heart: regular rate and rhythm, S1, S2 normal, no murmur, click, rub or gallop  Abdomen: soft, non-tender; bowel sounds normal; no masses,  no organomegaly  Extremities: extremities normal, atraumatic, no cyanosis or edema  Pulses: 2+ and symmetric  Skin: Skin color, texture, turgor normal. No rashes or lesions  Lymph nodes: Cervical, supraclavicular, and axillary nodes normal.  Neurologic: Grossly normal    Labs:     Lab Results   Component Value Date    COLORU Yellow 06/18/2018 "    CLARITYU Clear 06/18/2018    GLUCOSEU Negative 06/18/2018    BILIRUBINUR Negative 06/18/2018    KETONESU Trace (!) 06/18/2018    SPECGRAV >=1.030 06/18/2018    HGBUA Negative 06/18/2018    PHUR 5.0 06/18/2018    PROTEINUA Trace (!) 06/18/2018    UROBILINOGEN 0.2 E.U./dL 06/18/2018    NITRITE Negative 06/18/2018    LEUKOCYTESUR Negative 06/18/2018    BACTERIA None Seen 06/18/2018    RBCUA 0-2 06/18/2018    WBCUA 0-5 06/18/2018    SQUAMEPI 0-5 06/18/2018

## 2021-06-19 NOTE — LETTER
Letter by Mark Lin MD at      Author: Mark Lin MD Service: -- Author Type: --    Filed:  Encounter Date: 11/18/2019 Status: Signed         Clif Christie  [address] [place] [address]             November 18, 2019         Dear Mr. Christie,    Below are the results from your recent visit:    Resulted Orders   Glycosylated Hemoglobin A1c   Result Value Ref Range    Hemoglobin A1c 7.5 (H) 3.5 - 6.0 %   HIV Antigen/Antibody Screening Cascade   Result Value Ref Range    HIV Antigen / Antibody Negative Negative    Narrative    Method is Abbott HIV Ag/Ab for the detection of HIV p24 antigen, HIV-1 antibodies and HIV-2 antibodies.   Comprehensive Metabolic Panel   Result Value Ref Range    Sodium 142 136 - 145 mmol/L    Potassium 4.7 3.5 - 5.0 mmol/L    Chloride 104 98 - 107 mmol/L    CO2 26 22 - 31 mmol/L    Anion Gap, Calculation 12 5 - 18 mmol/L    Glucose 120 70 - 125 mg/dL    BUN 16 8 - 22 mg/dL    Creatinine 1.30 0.70 - 1.30 mg/dL    GFR MDRD Af Amer >60 >60 mL/min/1.73m2    GFR MDRD Non Af Amer 55 (L) >60 mL/min/1.73m2    Bilirubin, Total 1.2 (H) 0.0 - 1.0 mg/dL    Calcium 9.6 8.5 - 10.5 mg/dL    Protein, Total 6.7 6.0 - 8.0 g/dL    Albumin 3.8 3.5 - 5.0 g/dL    Alkaline Phosphatase 74 45 - 120 U/L    AST 18 0 - 40 U/L    ALT 32 0 - 45 U/L    Narrative    Fasting Glucose reference range is 70-99 mg/dL per  American Diabetes Association (ADA) guidelines.   PSA (Prostatic-Specific Antigen), Annual Screen   Result Value Ref Range    PSA 3.2 0.0 - 4.5 ng/mL    Narrative    Method is Abbott Prostate-Specific Antigen (PSA)  Standard-WHO 1st International (90:10)   Vitamin D, Total (25-Hydroxy)   Result Value Ref Range    Vitamin D, Total (25-Hydroxy) 35.5 30.0 - 80.0 ng/mL    Narrative    Deficiency <10.0 ng/mL  Insufficiency 10.0-29.9 ng/mL  Sufficiency 30.0-80.0 ng/mL  Toxicity (possible) >100.0 ng/mL       Continue your current diabetes management as discussed in order to  further improve.  Goal A1c remains < 7.0% ideally.     Your routine HIV screen was normal.     Your kidney function remains mildly reduced.  It appears stable.  Ensure adequate hydration.  Your liver function tests were otherwise normal.    Your prostate cancer screening test (i.e. PSA) was normal.     Your vitamin D level was at the low end of normal.  Confirm that you are currently using 2,000 units daily, then increase your OTC vitamin D from 2,000 units up to 4,000 units daily. We will plan to recheck your labs in the next 3-6 months to ensure desired improvement.      Please call with questions or contact us using [employer].    Sincerely,        Electronically signed by Mark Lin MD
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
## 2021-06-19 NOTE — LETTER
Letter by Mark Lin MD at      Author: Mark Lin MD Service: -- Author Type: --    Filed:  Encounter Date: 7/10/2019 Status: (Other)         Clif Christie  [address] [place] [address]             July 15, 2019         Dear Mr. Christie,    Below are the results from your recent visit:    Resulted Orders   Glycosylated Hemoglobin A1c   Result Value Ref Range    Hemoglobin A1c 8.2 (H) 3.5 - 6.0 %   Microalbumin, Random Urine   Result Value Ref Range    Microalbumin, Random Urine 3.32 (H) 0.00 - 1.99 mg/dL    Creatinine, Urine 387.6 mg/dL    Microalbumin/Creatinine Ratio Random Urine 8.6 <=19.9 mg/g    Narrative    Microalbumin, Random Urine  <2.0 mg/dL . . . . . . . . Normal  3.0-30.0 mg/dL . . . . . . Microalbuminuria  >30.0 mg/dL . . . . . .  . Clinical Proteinuria    Microalbumin/Creatinine Ratio, Random Urine  <20 mg/g . . . . .. . . . Normal   mg/g . . . . . . . Microalbuminuria  >300 mg/g . . . . . . . . Clinical Proteinuria       Vitamin D, Total (25-Hydroxy)   Result Value Ref Range    Vitamin D, Total (25-Hydroxy) 37.1 30.0 - 80.0 ng/mL    Narrative    Deficiency <10.0 ng/mL  Insufficiency 10.0-29.9 ng/mL  Sufficiency 30.0-80.0 ng/mL  Toxicity (possible) >100.0 ng/mL   Basic Metabolic Panel   Result Value Ref Range    Sodium 140 136 - 145 mmol/L    Potassium 4.7 3.5 - 5.0 mmol/L    Chloride 102 98 - 107 mmol/L    CO2 26 22 - 31 mmol/L    Anion Gap, Calculation 12 5 - 18 mmol/L    Glucose 157 (H) 70 - 125 mg/dL    Calcium 10.1 8.5 - 10.5 mg/dL    BUN 26 (H) 8 - 22 mg/dL    Creatinine 1.31 (H) 0.70 - 1.30 mg/dL    GFR MDRD Af Amer >60 >60 mL/min/1.73m2    GFR MDRD Non Af Amer 55 (L) >60 mL/min/1.73m2    Narrative    Fasting Glucose reference range is 70-99 mg/dL per  American Diabetes Association (ADA) guidelines.   Lipid Cascade   Result Value Ref Range    Cholesterol 127 <=199 mg/dL    Triglycerides 147 <=149 mg/dL    HDL Cholesterol 34 (L) >=40 mg/dL    LDL  Calculated 64 <=129 mg/dL    Patient Fasting > 8hrs? Yes        Continue your current diabetes management as discussed in order to further improve.  Goal A1c < 8.0% initially, < 7.0% ideally.     Please call with questions or contact us using [employer].    Sincerely,        Electronically signed by Mark Lin MD
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
## 2021-06-19 NOTE — LETTER
Letter by Mark Lin MD at      Author: Mark Lin MD Service: -- Author Type: --    Filed:  Encounter Date: 7/10/2019 Status: (Other)         Clif Christie  [address] [place] [address]             July 10, 2019         Dear Mr. Christie,    Below are the results from your recent visit:    Resulted Orders   Glycosylated Hemoglobin A1c   Result Value Ref Range    Hemoglobin A1c 8.2 (H) 3.5 - 6.0 %   Basic Metabolic Panel   Result Value Ref Range    Sodium 140 136 - 145 mmol/L    Potassium 4.7 3.5 - 5.0 mmol/L    Chloride 102 98 - 107 mmol/L    CO2 26 22 - 31 mmol/L    Anion Gap, Calculation 12 5 - 18 mmol/L    Glucose 157 (H) 70 - 125 mg/dL    Calcium 10.1 8.5 - 10.5 mg/dL    BUN 26 (H) 8 - 22 mg/dL    Creatinine 1.31 (H) 0.70 - 1.30 mg/dL    GFR MDRD Af Amer >60 >60 mL/min/1.73m2    GFR MDRD Non Af Amer 55 (L) >60 mL/min/1.73m2    Narrative    Fasting Glucose reference range is 70-99 mg/dL per  American Diabetes Association (ADA) guidelines.   Lipid Cascade   Result Value Ref Range    Cholesterol 127 <=199 mg/dL    Triglycerides 147 <=149 mg/dL    HDL Cholesterol 34 (L) >=40 mg/dL    LDL Calculated 64 <=129 mg/dL    Patient Fasting > 8hrs? Yes        Continue your current diabetes management as discussed in order to further improve.  Goal A1c < 8.0% initially, < 7.0% ideally.     Your kidney function remains mildly reduced.  It appears stable.  We will continue to follow closely.     Your cholesterol results were near goal.  Continue your current medication as discussed.   Ensure regular exercise, healthy diet, and weight loss modifications in order to further improve.  Weight goal < 210 pounds initially, < 200 pounds ideally.  We will plan to recheck your labs while fasting in the next 6-12 months to ensure desired improvement.       Please call with questions or contact us using [employer].    Sincerely,        Electronically signed by Mark Lin MD

## 2021-06-20 NOTE — LETTER
Letter by Mark Lin MD at      Author: Mark iLn MD Service: -- Author Type: --    Filed:  Encounter Date: 8/21/2020 Status: (Other)         Clif Christie  [address] [place] [address]             August 21, 2020         Dear Mr. Christie,    Below are the results from your recent visit:    Resulted Orders   Basic Metabolic Panel   Result Value Ref Range    Sodium 141 136 - 145 mmol/L    Potassium 4.4 3.5 - 5.0 mmol/L    Chloride 103 98 - 107 mmol/L    CO2 27 22 - 31 mmol/L    Anion Gap, Calculation 11 5 - 18 mmol/L    Glucose 129 (H) 70 - 125 mg/dL    Calcium 9.7 8.5 - 10.5 mg/dL    BUN 20 8 - 22 mg/dL    Creatinine 1.24 0.70 - 1.30 mg/dL    GFR MDRD Af Amer >60 >60 mL/min/1.73m2    GFR MDRD Non Af Amer 59 (L) >60 mL/min/1.73m2    Narrative    Fasting Glucose reference range is 70-99 mg/dL per  American Diabetes Association (ADA) guidelines.   Lipid Cascade   Result Value Ref Range    Cholesterol 125 <=199 mg/dL    Triglycerides 140 <=149 mg/dL    HDL Cholesterol 35 (L) >=40 mg/dL    LDL Calculated 62 <=129 mg/dL    Patient Fasting > 8hrs? Yes    Microalbumin, Random Urine   Result Value Ref Range    Microalbumin, Random Urine 2.38 (H) 0.00 - 1.99 mg/dL    Creatinine, Urine 280.7 mg/dL    Microalbumin/Creatinine Ratio Random Urine 8.5 <=19.9 mg/g    Narrative    Microalbumin, Random Urine  <2.0 mg/dL . . . . . . . . Normal  3.0-30.0 mg/dL . . . . . . Microalbuminuria  >30.0 mg/dL . . . . . .  . Clinical Proteinuria    Microalbumin/Creatinine Ratio, Random Urine  <20 mg/g . . . . .. . . . Normal   mg/g . . . . . . . Microalbuminuria  >300 mg/g . . . . . . . . Clinical Proteinuria           Continue your current diabetes management as discussed in order to further improve.  Goal A1c remains < 7.0% ideally.      Your cholesterol results were near goal.  Continue your current medication as discussed.  Ensure regular exercise, healthy diet, and weight loss modifications in  order to further improve.  Weight goal < 200 pounds initially, < 190 pounds ideally.  We will plan to recheck your labs while fasting in the next 6-12 months to ensure desired improvement.       Your urine screen was normal.  No evidence for significant protein in your urine.  We will plan to repeat this test on a yearly basis.     Please call with questions or contact us using [employer].    Sincerely,        Electronically signed by Mark Lin MD

## 2021-06-21 ENCOUNTER — RECORDS - HEALTHEAST (OUTPATIENT)
Dept: HEALTH INFORMATION MANAGEMENT | Facility: CLINIC | Age: 66
End: 2021-06-21

## 2021-06-21 NOTE — LETTER
Letter by Mark Lin MD at      Author: Mark Lin MD Service: -- Author Type: --    Filed:  Encounter Date: 12/23/2020 Status: (Other)         Clif Christie  [address] [place] [address]             December 23, 2020         Dear Mr. Christie,    Below are the results from your recent visit:    Resulted Orders   Glycosylated Hemoglobin A1C   Result Value Ref Range    Hemoglobin A1c 7.8 (H) <=5.6 %   Basic Metabolic Panel   Result Value Ref Range    Sodium 142 136 - 145 mmol/L    Potassium 4.1 3.5 - 5.0 mmol/L    Chloride 103 98 - 107 mmol/L    CO2 27 22 - 31 mmol/L    Anion Gap, Calculation 12 5 - 18 mmol/L    Glucose 176 (H) 70 - 125 mg/dL    Calcium 9.6 8.5 - 10.5 mg/dL    BUN 23 (H) 8 - 22 mg/dL    Creatinine 1.30 0.70 - 1.30 mg/dL    GFR MDRD Af Amer >60 >60 mL/min/1.73m2    GFR MDRD Non Af Amer 55 (L) >60 mL/min/1.73m2    Narrative    Fasting Glucose reference range is 70-99 mg/dL per  American Diabetes Association (ADA) guidelines.       Continue your current diabetes management as discussed in order to further improve.  Goal A1c < 7.5% initially, < 7.0% ideally.     Your kidney function remains mildly reduced.  It appears stable.  Ensure adequate hydration (even when fasting).  We will continue to follow closely.     Please call with questions or contact us using [employer].    Sincerely,        Electronically signed by Mark Lin MD       
Digestive/OB/GYN

## 2021-06-21 NOTE — PROGRESS NOTES
Assessment/Plan:     1. Routine general medical examination at a health care facility  Routine healthcare maintenance.  Preventative cares reviewed.  Medications reviewed and up-to-date otherwise patient does declines seasonal flu shot.  Annual physical exams to continue.  PSA screen based on age criteria.  - PSA, Annual Screen (Prostatic-Specific Antigen)    2. Class 1 obesity due to excess calories with serious comorbidity and body mass index (BMI) of 33.0 to 33.9 in adult  Therapeutic lifestyle changes for weight goal less than 210 pounds initially, less than 200 pounds ideally.    3. Diabetes mellitus, type 2 (H)  Type 2 diabetes, fair control with A1c increasing from 7.5% up to 7.8%.  Did review diabetes goal with A1c less than 7% ideally and continues glipizide extended release 10 mg twice daily metformin 1000 mg twice daily.  Reassess at follow-up in 4 months.  - Glycosylated Hemoglobin A1c    4. Essential hypertension  Hypertension, stable continues lisinopril hydrochlorothiazide 20/12.5 using 1 tablet daily with blood pressure 110/70 today.  - Comprehensive Metabolic Panel    5. Hyperlipidemia, unspecified hyperlipidemia type  Known history of hyperlipidemia and remains on atorvastatin 40 mg daily.    6. CKD (chronic kidney disease) stage 3, GFR 30-59 ml/min (H)  History of CKD stage III.  Ensure stable renal function and vitamin D level.  - Comprehensive Metabolic Panel  - Vitamin D, Total (25-Hydroxy)    7. Tubular adenoma of colon  History of tubular adenoma on colonoscopy August 31, 2016 with recommendation for 3-year follow-up.    8. Snoring  Snoring history.  Declines sleep study at this time.  We will continue to follow.  Weight goal as noted above.    9. Cutaneous skin tags  Skin tags x2 removed without complication.       I have had an Advance Directives discussion with the patient.  The following high BMI interventions were performed this visit: encouragement to exercise, weight monitoring,  "weight loss from baseline weight and lifestyle education regarding diet.  Ensure ongoing efforts to achieve weight goal < 210 pounds initially, < 200 pounds ideally.              Subjective:      Clif Christie is a 63 y.o. male who presents for an annual exam.  Doing well.  He has skin tags in right axilla that he would like to have removed.  Does snore.  Significant other sleeps in the other room.  Has never had a sleep study.  Uncertain of apneic episodes.  Denies significant daytime somnolence.  Does have type 2 diabetes.  Continues glipizide extended release and metformin.  Lisinopril hydrochlorothiazide for hypertension and atorvastatin for lipid management.  Has had history of CKD stage III.  Has had prior hep C screen negative December 16, 2011.  Has had mild bilirubin elevation historically with normal CBC without history of hemochromatosis etc.  Declines flu shot.  Comprehensive review of systems as above otherwise all negative.    S.O. \"Johanna\"   1 daughter \"Charice\"   2 dogs - Valery Crouch (both are chihuahuas...)   Dad - dec 62 sudden death, DM   Mom - DM   3 younger bros - Kurtis (DM), Billy, and Will   [place]   < 1/2 ppd - quit 12/08 (occasional cheat with cigarette...) - quit 3/13/13 again   Infrequent EtOH   No surgeries   No hospitalizations     Healthy Habits:   Regular Exercise: No  Healthy Diet: improved  Dental Visits Regularly: Yes  Seat Belt: Yes   Sexually active: Yes  Colonoscopy: Yes and 8/31/16 (repeat in 3 years)  Lipid Profile: Yes  Glucose Screen: Yes    Immunization History   Administered Date(s) Administered     Pneumo Polysac 23-V 08/17/2007     Td, Adult, Absorbed 07/05/2017     Td,adult,historic,unspecified 01/11/2000, 08/17/2007     Tdap 08/17/2007     ZOSTER, LIVE 07/05/2017     Immunization status: up to date and documented.  Vision Screening:both eyes  Hearing: PASS     Current Outpatient Prescriptions   Medication Sig Dispense Refill     " "aspirin 81 MG EC tablet Take 81 mg by mouth daily.       atorvastatin (LIPITOR) 40 MG tablet TAKE ONE TABLET BY MOUTH ONE TIME DAILY 90 tablet 2     blood sugar diagnostic (GLUCOSE BLOOD) Strp Accu-Chek [place] in vitro strips.  Test twice daily.       glipiZIDE (GLUCOTROL XL) 10 MG 24 hr tablet Take 1 tablet (10 mg total) by mouth 2 (two) times a day. 180 tablet 3     lisinopril-hydrochlorothiazide (PRINZIDE,ZESTORETIC) 20-12.5 mg per tablet TAKE ONE TABLET BY MOUTH ONE TIME DAILY 90 tablet 2     metFORMIN (GLUCOPHAGE) 1000 MG tablet TAKE ONE TABLET BY MOUTH TWICE A DAY WITH MEALS 180 tablet 2     No current facility-administered medications for this visit.      Past Medical History:   Diagnosis Date     Diabetes mellitus (H)      Hypertension      History reviewed. No pertinent surgical history.  Review of patient's allergies indicates no known allergies.  Family History   Problem Relation Age of Onset     Diabetes Mother      Diabetes Father      Diabetes Brother      Social History     Social History     Marital status: Single     Spouse name: N/A     Number of children: N/A     Years of education: N/A     Occupational History     Not on file.     Social History Main Topics     Smoking status: Light Tobacco Smoker     Types: Cigarettes     Smokeless tobacco: Never Used     Alcohol use Yes      Comment: rare     Drug use: No     Sexual activity: Yes     Partners: Female     Other Topics Concern     Not on file     Social History Narrative       Review of Systems  Comprehensive ROS: as above, otherwise all negative.           Objective:     /70  Pulse 79  Ht 5' 7.5\" (1.715 m)  Wt 220 lb (99.8 kg)  SpO2 94%  BMI 33.95 kg/m2  Body mass index is 33.95 kg/(m^2).    Physical    General Appearance:    Alert, cooperative, no distress, appears stated age.  Obesity per   Head:    Normocephalic, without obvious abnormality, atraumatic   Eyes:    PERRL, conjunctiva/corneas clear, EOM's intact, fundi     benign, both " eyes        Ears:    Normal TM's and external ear canals, both ears   Nose:   Nares normal, septum midline, mucosa normal, no drainage    or sinus tenderness   Throat:   Lips, mucosa, and tongue normal; teeth and gums normal   Neck:   Supple, symmetrical, trachea midline, no adenopathy;        thyroid:  No enlargement/tenderness/nodules; no carotid    bruit or JVD   Back:     Symmetric, no curvature, ROM normal, no CVA tenderness   Lungs:     Clear to auscultation bilaterally, respirations unlabored   Chest wall:    No tenderness or deformity   Heart:    Regular rate and rhythm, S1 and S2 normal, no murmur, rub   or gallop   Abdomen:     Soft, non-tender, bowel sounds active all four quadrants,     no masses, no organomegaly.  Obese.   Genitalia:    Normal male without lesion, discharge or tenderness.  No inguinal hernia noted.     Rectal:    Normal tone.  Prostate normal/symmetric, no masses or tenderness.   Extremities:   Extremities normal, atraumatic, no cyanosis or edema   Pulses:   2+ and symmetric all extremities   Skin:   Skin color, texture, turgor normal, no rashes or lesions.  Skin tags x6 in right axilla.  Skin tags x2 removed typical fashion.   Lymph nodes:   Cervical, supraclavicular, and axillary nodes normal   Neurologic:   CNII-XII intact. Normal strength, sensation and reflexes       throughout                This note has been dictated using voice recognition software and as a result may contain minor grammatical errors and unintended word substitutions.

## 2021-06-21 NOTE — LETTER
Letter by Mark Lin MD at      Author: Mark Lin MD Service: -- Author Type: --    Filed:  Encounter Date: 4/22/2021 Status: (Other)         Clif Christie  [address] [place] [address]             April 22, 2021         Dear Mr. Christie,    Below are the results from your recent visit:    Resulted Orders   Glycosylated Hemoglobin A1c   Result Value Ref Range    Hemoglobin A1c 7.7 (H) <=5.6 %   Lipid Cascade   Result Value Ref Range    Cholesterol 121 <=199 mg/dL    Triglycerides 160 (H) <=149 mg/dL    HDL Cholesterol 33 (L) >=40 mg/dL    LDL Calculated 56 <=129 mg/dL    Patient Fasting > 8hrs? Yes    Comprehensive Metabolic Panel   Result Value Ref Range    Sodium 139 136 - 145 mmol/L    Potassium 4.5 3.5 - 5.0 mmol/L    Chloride 101 98 - 107 mmol/L    CO2 26 22 - 31 mmol/L    Anion Gap, Calculation 12 5 - 18 mmol/L    Glucose 180 (H) 70 - 125 mg/dL    BUN 22 8 - 22 mg/dL    Creatinine 1.40 (H) 0.70 - 1.30 mg/dL    GFR MDRD Af Amer >60 >60 mL/min/1.73m2    GFR MDRD Non Af Amer 51 (L) >60 mL/min/1.73m2    Bilirubin, Total 1.3 (H) 0.0 - 1.0 mg/dL    Calcium 9.3 8.5 - 10.5 mg/dL    Protein, Total 6.6 6.0 - 8.0 g/dL    Albumin 4.0 3.5 - 5.0 g/dL    Alkaline Phosphatase 78 45 - 120 U/L    AST 18 0 - 40 U/L    ALT 36 0 - 45 U/L    Narrative    Fasting Glucose reference range is 70-99 mg/dL per  American Diabetes Association (ADA) guidelines.       Continue your current diabetes management as discussed in order to further improve.  Goal A1c < 7.5% initially, < 7.0% ideally.     Your cholesterol results were near goal.  Continue your current medication as discussed.   Ensure regular exercise, healthy diet, and weight loss modifications in order to further improve.  Weight goal < 200 pounds initially, < 190 pounds ideally.  We will plan to recheck your labs while fasting in the next 6-12 months to ensure desired improvement.       Your kidney function remains reduced.  It appears  stable.  We will continue to follow closely.     Your liver function tests were normal.     Please call with questions or contact us using [employer].    Sincerely,        Electronically signed by Mark Lin MD

## 2021-06-24 NOTE — PROGRESS NOTES
Assessment/Plan:    1. Type 2 diabetes mellitus with diabetic cataract, without long-term current use of insulin (H)  Type 2 diabetes, improved control with A1c decreasing from 7.8% down to 7.1% associate with 4 pound weight loss and improved diet.  Will continue dietary and exercise modification for weight goal less than 210 pounds initially, less than 200 pounds ideally.  Refill on metformin 1000 mg twice daily provided.  Glipizide extended release 10 mg twice daily to continue.  Annual diabetic eye exams to be completed.  Repeat microalbumin screen at follow-up.  - Glycosylated Hemoglobin A1c  - metFORMIN (GLUCOPHAGE) 1000 MG tablet; TAKE ONE TABLET BY MOUTH TWICE A DAY WITH MEALS  Dispense: 180 tablet; Refill: 2    2. Essential hypertension  Hypertension, stable.  Lisinopril hydrochlorothiazide 20/12.5 using 1 tab daily.  - Basic Metabolic Panel    3. Hyperlipidemia, unspecified hyperlipidemia type  Remains on atorvastatin 40 mg daily for lipid management with cholesterol results June 18, 2018 near goal.    4. CKD (chronic kidney disease) stage 3, GFR 30-59 ml/min (H)  History of CKD stage III.  Basic metabolic panel pending.  - Basic Metabolic Panel    5. Tubular adenoma of colon  Prior history of tubular adenomas on colonoscopy August 31, 2016 with recommended 3-year follow-up.    6. Class 1 obesity due to excess calories with serious comorbidity and body mass index (BMI) of 33.0 to 33.9 in adult  Lifestyle changes for weight goal less than 210 pounds initially, less than 200 pounds ideally before follow-up diabetic visit in 4 months.    7. Vitamin D deficiency  Ensure adequate vitamin D replacement completing 50,000 units twice a week then 2000 units daily.  States has 3 doses left of 50,000 unit variety.  - Vitamin D, Total (25-Hydroxy)      The following high BMI interventions were performed this visit: encouragement to exercise, weight monitoring, weight loss from baseline weight and lifestyle education  "regarding diet.  Ensure ongoing efforts to achieve weight goal < 210 pounds initially, < 200 pounds ideally.         Subjective:    Clif Christie is seen today for follow-up evaluation.  Type 2 diabetes.  Metformin 1000 mg twice daily.  Glipizide extended release 10 mg twice daily.  No hypoglycemic symptoms.  No diarrhea.  No nausea or vomiting.  Lisinopril hydrochlorothiazide 20/12.5 using 1 tab daily as well as atorvastatin 40 mg daily for lipid management.  No muscle aches.  Tubular adenoma on prior colonoscopy August 31, 2016 with recommended 3-year follow-up.  Vitamin D deficiency with level of 13.8 October 24, 2018.  Using 50,000 units twice a week to complete 12 weeks and 2000 units daily recommended.  History of CKD stage III.  No recent illness.  Trying to restrict caloric intake as well as eating better with fewer dietary indiscretions.  Comprehensive review of systems as above otherwise all negative.    S.O. \"Johanna\"   1 daughter \"Charice\"   2 dogs - Valery Crouch (both are chihuahuas...)   Dad - dec 62 sudden death, DM   Mom - DM   3 younger bros - Kurtis (DM), Billy, and Will   [employer] ([employer])   < 1/2 ppd - quit 12/08 (occasional cheat with cigarette...) - quit 3/13/13 again   Infrequent EtOH   No surgeries   No hospitalizations     No past surgical history on file.     Family History   Problem Relation Age of Onset     Diabetes Mother      Diabetes Father      Diabetes Brother         Past Medical History:   Diagnosis Date     Diabetes mellitus (H)      Hypertension         Social History     Tobacco Use     Smoking status: Light Tobacco Smoker     Types: Cigarettes     Smokeless tobacco: Never Used   Substance Use Topics     Alcohol use: Yes     Comment: rare     Drug use: No        Current Outpatient Medications   Medication Sig Dispense Refill     aspirin 81 MG EC tablet Take 81 mg by mouth daily.       atorvastatin (LIPITOR) 40 MG tablet TAKE ONE TABLET BY MOUTH ONE " TIME DAILY 90 tablet 2     blood sugar diagnostic (GLUCOSE BLOOD) Strp Accu-Chek [place] in vitro strips.  Test twice daily.       ergocalciferol (ERGOCALCIFEROL) 50,000 unit capsule Take 1 capsule (50,000 Units total) by mouth 2 (two) times a week. x 12 weeks, then use vitamin D 2,000 units daily 24 capsule 0     glipiZIDE (GLUCOTROL XL) 10 MG 24 hr tablet Take 1 tablet (10 mg total) by mouth 2 (two) times a day. 180 tablet 3     lisinopril-hydrochlorothiazide (PRINZIDE,ZESTORETIC) 20-12.5 mg per tablet TAKE ONE TABLET BY MOUTH ONE TIME DAILY 90 tablet 2     metFORMIN (GLUCOPHAGE) 1000 MG tablet TAKE ONE TABLET BY MOUTH TWICE A DAY WITH MEALS 180 tablet 2     No current facility-administered medications for this visit.           Objective:    Vitals:    02/26/19 0800   BP: 110/60   Pulse: 86   SpO2: 95%   Weight: 216 lb (98 kg)      Body mass index is 33.33 kg/m .    Alert.  No apparent distress.  Chest clear.  Cardiac exam regular.  Extremities warm and dry.      This note has been dictated using voice recognition software and as a result may contain minor grammatical errors and unintended word substitutions.

## 2021-08-24 ENCOUNTER — OFFICE VISIT (OUTPATIENT)
Dept: FAMILY MEDICINE | Facility: CLINIC | Age: 66
End: 2021-08-24
Payer: COMMERCIAL

## 2021-08-24 VITALS
OXYGEN SATURATION: 94 % | HEART RATE: 89 BPM | SYSTOLIC BLOOD PRESSURE: 100 MMHG | DIASTOLIC BLOOD PRESSURE: 60 MMHG | WEIGHT: 206 LBS | BODY MASS INDEX: 32.02 KG/M2

## 2021-08-24 DIAGNOSIS — E11.319 TYPE 2 DIABETES MELLITUS WITH RETINOPATHY, WITHOUT LONG-TERM CURRENT USE OF INSULIN, MACULAR EDEMA PRESENCE UNSPECIFIED, UNSPECIFIED LATERALITY, UNSPECIFIED RETINOPATHY SEVERITY (H): Primary | ICD-10-CM

## 2021-08-24 DIAGNOSIS — I10 ESSENTIAL HYPERTENSION: ICD-10-CM

## 2021-08-24 DIAGNOSIS — E78.2 MIXED HYPERLIPIDEMIA: ICD-10-CM

## 2021-08-24 DIAGNOSIS — N18.31 STAGE 3A CHRONIC KIDNEY DISEASE (H): ICD-10-CM

## 2021-08-24 LAB
ANION GAP SERPL CALCULATED.3IONS-SCNC: 10 MMOL/L (ref 5–18)
BUN SERPL-MCNC: 24 MG/DL (ref 8–22)
CALCIUM SERPL-MCNC: 10.2 MG/DL (ref 8.5–10.5)
CHLORIDE BLD-SCNC: 103 MMOL/L (ref 98–107)
CO2 SERPL-SCNC: 29 MMOL/L (ref 22–31)
CREAT SERPL-MCNC: 1.38 MG/DL (ref 0.7–1.3)
CREAT UR-MCNC: 374 MG/DL
GFR SERPL CREATININE-BSD FRML MDRD: 53 ML/MIN/1.73M2
GLUCOSE BLD-MCNC: 157 MG/DL (ref 70–125)
HBA1C MFR BLD: 7 % (ref 0–5.6)
MICROALBUMIN UR-MCNC: 2.56 MG/DL (ref 0–1.99)
MICROALBUMIN/CREAT UR: 6.8 MG/G CR
POTASSIUM BLD-SCNC: 4.7 MMOL/L (ref 3.5–5)
SODIUM SERPL-SCNC: 142 MMOL/L (ref 136–145)

## 2021-08-24 PROCEDURE — 82043 UR ALBUMIN QUANTITATIVE: CPT | Performed by: FAMILY MEDICINE

## 2021-08-24 PROCEDURE — 83036 HEMOGLOBIN GLYCOSYLATED A1C: CPT | Performed by: FAMILY MEDICINE

## 2021-08-24 PROCEDURE — 99214 OFFICE O/P EST MOD 30 MIN: CPT | Performed by: FAMILY MEDICINE

## 2021-08-24 PROCEDURE — 36415 COLL VENOUS BLD VENIPUNCTURE: CPT | Performed by: FAMILY MEDICINE

## 2021-08-24 PROCEDURE — 80048 BASIC METABOLIC PNL TOTAL CA: CPT | Performed by: FAMILY MEDICINE

## 2021-08-24 RX ORDER — GLIPIZIDE 10 MG/1
10 TABLET, FILM COATED, EXTENDED RELEASE ORAL 2 TIMES DAILY
Qty: 180 TABLET | Refills: 3 | Status: SHIPPED | OUTPATIENT
Start: 2021-08-24 | End: 2022-11-14

## 2021-08-24 NOTE — LETTER
August 24, 2021      Clif Christie  [address] [place] [address]        Dear ,    We are writing to inform you of your test results.    Continue your current diabetes management as discussed.  Improvement noted.  Goal A1c remains < 7.0% ideally.     Your urine screen was normal.  No evidence for significant protein in your urine.  We will plan to repeat this test on a yearly basis.     Your kidney function remains reduced.  It appears stable.  We will continue to follow closely.     Resulted Orders   Hemoglobin A1c   Result Value Ref Range    Hemoglobin A1C 7.0 (H) 0.0 - 5.6 %      Comment:      Normal <5.7%   Prediabetes 5.7-6.4%    Diabetes 6.5% or higher     Note: Adopted from ADA consensus guidelines.   Albumin Random Urine Quantitative with Creat Ratio   Result Value Ref Range    Microalbumin Urine mg/dL 2.56 (H) 0.00 - 1.99 mg/dL    Creatinine Urine mg/dL 374 mg/dL    Microalbumin Urine mg/g Cr 6.8 <=19.9 mg/g Cr    Narrative    Microalbumin, Random Urine   <2.0 mg/dL . . . . . . . . Normal   3.0-30.0 mg/dL . . . . . . Microalbuminuria   >30.0 mg/dL . . . . . .  . Clinical Proteinuria     Microalbumin/Creatinine Ratio, Random Urine   <20 mg/g . . . . .. . . . Normal    mg/g . . . . . . . Microalbuminuria   >300 mg/g . . . . . . . . Clinical Proteinuria   Basic metabolic panel   Result Value Ref Range    Sodium 142 136 - 145 mmol/L    Potassium 4.7 3.5 - 5.0 mmol/L    Chloride 103 98 - 107 mmol/L    Carbon Dioxide (CO2) 29 22 - 31 mmol/L    Anion Gap 10 5 - 18 mmol/L    Urea Nitrogen 24 (H) 8 - 22 mg/dL    Creatinine 1.38 (H) 0.70 - 1.30 mg/dL    Calcium 10.2 8.5 - 10.5 mg/dL    Glucose 157 (H) 70 - 125 mg/dL    GFR Estimate 53 (L) >60 mL/min/1.73m2      Comment:      As of July 11, 2021, eGFR is calculated by the CKD-EPI creatinine equation, without race adjustment. eGFR can be influenced by muscle mass, exercise, and diet. The reported eGFR is an estimation only and is  only applicable if the renal function is stable.       If you have any questions or concerns, please call the clinic at the number listed above.       Sincerely,      Mark Lin MD

## 2021-08-24 NOTE — PROGRESS NOTES
Assessment/Plan:    Type 2 diabetes mellitus with retinopathy, without long-term current use of insulin, macular edema presence unspecified, unspecified laterality, unspecified retinopathy severity (H)  Type 2 diabetes improved control with A1c decreasing from 7.7% down to 7.0% today.  Continues Metformin 1000 mg twice daily and glipizide XL 10 mg twice daily.  1 pound weight loss since prior office visit.  Monofilament testing normal.  Will leave urine for microalbumin screen.  Annual eye exams to continue.  Reassess at annual wellness visit around December 23, 2021.  - metFORMIN (GLUCOPHAGE) 1000 MG tablet  Dispense: 180 tablet; Refill: 3  - glipiZIDE (GLUCOTROL XL) 10 MG 24 hr tablet  Dispense: 180 tablet; Refill: 3  - Hemoglobin A1c  - blood glucose monitoring (NO BRAND SPECIFIED) meter device kit  Dispense: 1 kit; Refill: 0  - blood glucose (NO BRAND SPECIFIED) test strip  Dispense: 100 strip; Refill: 3  - Albumin Random Urine Quantitative with Creat Ratio  - Basic metabolic panel    Essential hypertension  Hypertension well controlled.  Continues use of lisinopril hydrochlorothiazide 20/12.5 daily.  - Basic metabolic panel    Mixed hyperlipidemia  Atorvastatin 40 mg daily for lipid management.    Stage 3a chronic kidney disease  History of CKD stage IIIa with prior creatinine of 1.40 GFR 51.  Will ensure stable renal function.  Ensure adequate hydration.  - Basic metabolic panel      The following high BMI interventions were performed this visit: encouragement to exercise, weight monitoring, weight loss from baseline weight and lifestyle education regarding diet .  Ensure ongoing efforts to achieve weight goal < 200 pounds initially, < 190 pounds ideally.         Subjective:    Clif Christie is seen today for follow-up assessment.  Type 2 diabetes.  Continues Metformin 1000 mg twice daily, glipizide XL 10 mg twice daily and aspirin 81 mg daily.  Prior A1c had improved from 7.8% down to 7.7% April 22,  "2021.  Trying to eat more fruits and healthier food.  Still enjoys cheese.  1 pound weight loss since prior visit.  Atorvastatin 40 mg daily for lipid management.  Lisinopril hydrochlorothiazide 20/12.5 daily for hypertension.  CKD stage IIIa with creatinine 1.40 GFR 51.  Comprehensive review of systems as above otherwise all negative.    S.O. 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     Family History   Problem Relation Age of Onset     Diabetes Mother      Diabetes Father      Diabetes Brother         Past Medical History:   Diagnosis Date     Diabetes mellitus (H)      Hypertension         Social History     Tobacco Use     Smoking status: Light Tobacco Smoker     Types: Cigarettes     Smokeless tobacco: Never Used   Substance Use Topics     Alcohol use: Yes     Comment: Alcoholic Drinks/day: rare     Drug use: No        Current Outpatient Medications   Medication Sig Dispense Refill     aspirin 81 MG EC tablet [ASPIRIN 81 MG EC TABLET] Take 81 mg by mouth daily.       atorvastatin (LIPITOR) 40 MG tablet [ATORVASTATIN (LIPITOR) 40 MG TABLET] Take 1 tablet (40 mg total) by mouth daily. 90 tablet 3     blood glucose (NO BRAND SPECIFIED) test strip Use to test blood sugar 1 times daily or as directed. 100 strip 3     blood glucose monitoring (NO BRAND SPECIFIED) meter device kit Use to test blood sugar 1 times daily or as directed. 1 kit 0     blood glucose test (ACCU-CHEK GUIDE TEST STRIPS) strips [BLOOD GLUCOSE TEST (ACCU-CHEK GUIDE TEST STRIPS) STRIPS] Use 1 each As Directed daily. Dispense brand per patient's insurance at pharmacy discretion. 50 strip 3     " cholecalciferol, vitamin D3, (VITAMIN D3) 2,000 unit capsule [CHOLECALCIFEROL, VITAMIN D3, (VITAMIN D3) 2,000 UNIT CAPSULE] Take 1,000 Units by mouth daily.       glipiZIDE (GLUCOTROL XL) 10 MG 24 hr tablet Take 1 tablet (10 mg) by mouth 2 times daily 180 tablet 3     lisinopriL-hydrochlorothiazide (PRINZIDE,ZESTORETIC) 20-12.5 mg per tablet [LISINOPRIL-HYDROCHLOROTHIAZIDE (PRINZIDE,ZESTORETIC) 20-12.5 MG PER TABLET] TAKE 1 TABLET BY MOUTH EVERY DAY 90 tablet 3     metFORMIN (GLUCOPHAGE) 1000 MG tablet Take 1 tablet (1,000 mg) by mouth 2 times daily (with meals) 180 tablet 3          Objective:    Vitals:    08/24/21 0831   BP: 100/60   Pulse: 89   SpO2: 94%   Weight: 93.4 kg (206 lb)      Body mass index is 32.02 kg/m .    Alert.  No apparent distress.  Chest clear.  Cardiac exam regular.  Extremities warm and dry.  Monofilament testing is normal.  BMI 32.02.      This note has been dictated using voice recognition software and as a result may contain minor grammatical errors and unintended word substitutions.

## 2021-12-23 ENCOUNTER — OFFICE VISIT (OUTPATIENT)
Dept: FAMILY MEDICINE | Facility: CLINIC | Age: 66
End: 2021-12-23
Payer: COMMERCIAL

## 2021-12-23 VITALS
OXYGEN SATURATION: 93 % | HEART RATE: 76 BPM | HEIGHT: 68 IN | SYSTOLIC BLOOD PRESSURE: 120 MMHG | DIASTOLIC BLOOD PRESSURE: 70 MMHG | BODY MASS INDEX: 31.07 KG/M2 | WEIGHT: 205 LBS

## 2021-12-23 DIAGNOSIS — Z23 ENCOUNTER FOR IMMUNIZATION: ICD-10-CM

## 2021-12-23 DIAGNOSIS — H90.3 ASYMMETRICAL SENSORINEURAL HEARING LOSS: ICD-10-CM

## 2021-12-23 DIAGNOSIS — I10 ESSENTIAL HYPERTENSION: ICD-10-CM

## 2021-12-23 DIAGNOSIS — E55.9 VITAMIN D DEFICIENCY: ICD-10-CM

## 2021-12-23 DIAGNOSIS — E80.6 HYPERBILIRUBINEMIA: ICD-10-CM

## 2021-12-23 DIAGNOSIS — E11.319 TYPE 2 DIABETES MELLITUS WITH RETINOPATHY, WITHOUT LONG-TERM CURRENT USE OF INSULIN, MACULAR EDEMA PRESENCE UNSPECIFIED, UNSPECIFIED LATERALITY, UNSPECIFIED RETINOPATHY SEVERITY (H): ICD-10-CM

## 2021-12-23 DIAGNOSIS — D12.6 TUBULAR ADENOMA OF COLON: ICD-10-CM

## 2021-12-23 DIAGNOSIS — E78.2 MIXED HYPERLIPIDEMIA: ICD-10-CM

## 2021-12-23 DIAGNOSIS — Z12.5 SCREENING FOR PROSTATE CANCER: ICD-10-CM

## 2021-12-23 DIAGNOSIS — E66.811 CLASS 1 OBESITY DUE TO EXCESS CALORIES WITH SERIOUS COMORBIDITY AND BODY MASS INDEX (BMI) OF 31.0 TO 31.9 IN ADULT: ICD-10-CM

## 2021-12-23 DIAGNOSIS — N18.31 STAGE 3A CHRONIC KIDNEY DISEASE (H): ICD-10-CM

## 2021-12-23 DIAGNOSIS — Z00.01 ENCOUNTER FOR GENERAL ADULT MEDICAL EXAMINATION WITH ABNORMAL FINDINGS: Primary | ICD-10-CM

## 2021-12-23 DIAGNOSIS — Z23 HIGH PRIORITY FOR 2019-NCOV VACCINE: ICD-10-CM

## 2021-12-23 DIAGNOSIS — E66.09 CLASS 1 OBESITY DUE TO EXCESS CALORIES WITH SERIOUS COMORBIDITY AND BODY MASS INDEX (BMI) OF 31.0 TO 31.9 IN ADULT: ICD-10-CM

## 2021-12-23 LAB
ALBUMIN SERPL-MCNC: 3.9 G/DL (ref 3.5–5)
ALP SERPL-CCNC: 76 U/L (ref 45–120)
ALT SERPL W P-5'-P-CCNC: 32 U/L (ref 0–45)
ANION GAP SERPL CALCULATED.3IONS-SCNC: 14 MMOL/L (ref 5–18)
AST SERPL W P-5'-P-CCNC: 17 U/L (ref 0–40)
BILIRUB SERPL-MCNC: 1.2 MG/DL (ref 0–1)
BUN SERPL-MCNC: 20 MG/DL (ref 8–22)
CALCIUM SERPL-MCNC: 10 MG/DL (ref 8.5–10.5)
CHLORIDE BLD-SCNC: 102 MMOL/L (ref 98–107)
CHOLEST SERPL-MCNC: 119 MG/DL
CO2 SERPL-SCNC: 25 MMOL/L (ref 22–31)
CREAT SERPL-MCNC: 1.25 MG/DL (ref 0.7–1.3)
ERYTHROCYTE [DISTWIDTH] IN BLOOD BY AUTOMATED COUNT: 12.6 % (ref 10–15)
FASTING STATUS PATIENT QL REPORTED: YES
GFR SERPL CREATININE-BSD FRML MDRD: 63 ML/MIN/1.73M2
GLUCOSE BLD-MCNC: 148 MG/DL (ref 70–125)
HBA1C MFR BLD: 7.4 % (ref 0–5.6)
HCT VFR BLD AUTO: 46 % (ref 40–53)
HDLC SERPL-MCNC: 37 MG/DL
HGB BLD-MCNC: 15.8 G/DL (ref 13.3–17.7)
HOLD SPECIMEN: NORMAL
HOLD SPECIMEN: NORMAL
LDLC SERPL CALC-MCNC: 53 MG/DL
MCH RBC QN AUTO: 29.5 PG (ref 26.5–33)
MCHC RBC AUTO-ENTMCNC: 34.3 G/DL (ref 31.5–36.5)
MCV RBC AUTO: 86 FL (ref 78–100)
PLATELET # BLD AUTO: 296 10E3/UL (ref 150–450)
POTASSIUM BLD-SCNC: 4.1 MMOL/L (ref 3.5–5)
PROT SERPL-MCNC: 6.8 G/DL (ref 6–8)
PSA SERPL-MCNC: 4.56 UG/L (ref 0–4.5)
RBC # BLD AUTO: 5.36 10E6/UL (ref 4.4–5.9)
SODIUM SERPL-SCNC: 141 MMOL/L (ref 136–145)
TRIGL SERPL-MCNC: 145 MG/DL
WBC # BLD AUTO: 8.1 10E3/UL (ref 4–11)

## 2021-12-23 PROCEDURE — G0438 PPPS, INITIAL VISIT: HCPCS | Performed by: FAMILY MEDICINE

## 2021-12-23 PROCEDURE — 90732 PPSV23 VACC 2 YRS+ SUBQ/IM: CPT | Performed by: FAMILY MEDICINE

## 2021-12-23 PROCEDURE — 80061 LIPID PANEL: CPT | Performed by: FAMILY MEDICINE

## 2021-12-23 PROCEDURE — 0004A COVID-19,PF,PFIZER (12+ YRS): CPT | Performed by: FAMILY MEDICINE

## 2021-12-23 PROCEDURE — G0103 PSA SCREENING: HCPCS | Performed by: FAMILY MEDICINE

## 2021-12-23 PROCEDURE — 99214 OFFICE O/P EST MOD 30 MIN: CPT | Mod: 25 | Performed by: FAMILY MEDICINE

## 2021-12-23 PROCEDURE — 36415 COLL VENOUS BLD VENIPUNCTURE: CPT | Performed by: FAMILY MEDICINE

## 2021-12-23 PROCEDURE — G0009 ADMIN PNEUMOCOCCAL VACCINE: HCPCS | Performed by: FAMILY MEDICINE

## 2021-12-23 PROCEDURE — 91300 COVID-19,PF,PFIZER (12+ YRS): CPT | Performed by: FAMILY MEDICINE

## 2021-12-23 PROCEDURE — 83036 HEMOGLOBIN GLYCOSYLATED A1C: CPT | Performed by: FAMILY MEDICINE

## 2021-12-23 PROCEDURE — 85027 COMPLETE CBC AUTOMATED: CPT | Performed by: FAMILY MEDICINE

## 2021-12-23 PROCEDURE — 80053 COMPREHEN METABOLIC PANEL: CPT | Performed by: FAMILY MEDICINE

## 2021-12-23 PROCEDURE — 82306 VITAMIN D 25 HYDROXY: CPT | Performed by: FAMILY MEDICINE

## 2021-12-23 ASSESSMENT — ACTIVITIES OF DAILY LIVING (ADL): CURRENT_FUNCTION: NO ASSISTANCE NEEDED

## 2021-12-23 ASSESSMENT — MIFFLIN-ST. JEOR: SCORE: 1671.43

## 2021-12-23 NOTE — PROGRESS NOTES
"SUBJECTIVE:     Clif Christie is a 66 year old male who presents for Preventive Visit.      Annual wellness visit completed.  Risk questionnaire reviewed in detail.  Lack of consistent exercise and suboptimal diet.  Bilateral hearing loss sensorineural and has seen audiologist previously.  Underlying type 2 diabetes and continues Metformin 1000 mg twice daily and glipizide XL 10 mg twice daily.  Tolerating well without hypoglycemic symptoms.  Has lost additional 1 pound since prior visit.  Some dietary indiscretion.  Had potatoes recently as an example of increased starch and carbohydrate consumption.  Struggles to get under 200 pounds.  Lisinopril hydrochlorothiazide 20/12.5 daily for hypertension and atorvastatin 40 mg daily for lipid management.  Has had vitamin D deficiency historically.  Patient feels he is likely using 2000 units daily but will confirm 1000 versus 2000 units.  CKD stage IIIa with prior creatinine 1.40 and GFR 51.  Denies recent illness.  Would like to have Pfizer Covid-19 third shot booster today.  Needs Pneumovax immunization after previously receiving Prevnar at age 64.  Comprehensive review of systems as above otherwise all negative.      S.O. 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  No    Healthy Habits:     In general, how would you rate your overall health?  Good    Frequency of exercise:  None    Do you usually eat at least 4 servings of fruit and vegetables a day, include whole grains    & fiber and avoid regularly eating " "high fat or \"junk\" foods?  No    Taking medications regularly:  Yes    Medication side effects:  None    Ability to successfully perform activities of daily living:  No assistance needed    Home Safety:  No safety concerns identified    Hearing Impairment:  Need to ask people to speak up or repeat themselves    In the past 6 months, have you been bothered by leaking of urine?  No    In general, how would you rate your overall mental or emotional health?  Excellent      PHQ-2 Total Score: 0    Additional concerns today:  No    Do you feel safe in your environment? Yes    Have you ever done Advance Care Planning? (For example, a Health Directive, POLST, or a discussion with a medical provider or your loved ones about your wishes): No, advance care planning information given to patient to review.  Patient plans to discuss their wishes with loved ones or provider.         Fall risk  Fallen 2 or more times in the past year?: No  Any fall with injury in the past year?: No    Cognitive Screening   1) Repeat 3 items (Leader, Season, Table)    2) Clock draw: NORMAL  3) 3 item recall: Recalls 3 objects  Results: 3 items recalled: COGNITIVE IMPAIRMENT LESS LIKELY    Mini-CogTM Copyright S Yousuf. Licensed by the author for use in [facility]; reprinted with permission (soob@[facility]). All rights reserved.      Do you have sleep apnea, excessive snoring or daytime drowsiness?: no    Reviewed and updated as needed this visit by clinical staff  Tobacco  Allergies  Meds  Problems  Med Hx  Surg Hx  Fam Hx         Reviewed and updated as needed this visit by Provider  Tobacco  Allergies  Meds  Problems  Med Hx  Surg Hx  Fam Hx        Social History     Tobacco Use     Smoking status: Light Tobacco Smoker     Types: Cigarettes     Smokeless tobacco: Never Used   Substance Use Topics     Alcohol use: Yes     Comment: Alcoholic Drinks/day: rare     If you drink alcohol do you typically have >3 drinks per day or " >7 drinks per week? No    Alcohol Use 12/23/2021   Prescreen: >3 drinks/day or >7 drinks/week? No   Prescreen: >3 drinks/day or >7 drinks/week? -                Current providers sharing in care for this patient include:   Patient Care Team:  Mark Lin MD as PCP - General  Mark Lin MD as Assigned PCP    The following health maintenance items are reviewed in Epic and correct as of today:  Health Maintenance Due   Topic Date Due     HEMOGLOBIN  Never done     LUNG CANCER SCREENING  Never done     INFLUENZA VACCINE (1) Never done     COVID-19 Vaccine (3 - Booster for Pfizer series) 11/13/2021     Lab work is in process  Labs reviewed in EPIC  BP Readings from Last 3 Encounters:   12/23/21 120/70   08/24/21 100/60   04/22/21 100/60    Wt Readings from Last 3 Encounters:   12/23/21 93 kg (205 lb)   08/24/21 93.4 kg (206 lb)   04/22/21 93.9 kg (207 lb)                  Patient Active Problem List   Diagnosis     Dupuytren's Contracture     Type 2 diabetes mellitus with retinopathy, without long-term current use of insulin, macular edema presence unspecified, unspecified laterality, unspecified retinopathy severity (H)     Hyperlipidemia     Obesity     Essential Hypertension     Tubular adenoma of colon     Cataracts, bilateral     Vitreous floaters of both eyes     Vitamin D deficiency     Asymmetrical sensorineural hearing loss     Stage 3a chronic kidney disease (H)     Hyperbilirubinemia     History reviewed. No pertinent surgical history.    Social History     Tobacco Use     Smoking status: Light Tobacco Smoker     Types: Cigarettes     Smokeless tobacco: Never Used   Substance Use Topics     Alcohol use: Yes     Comment: Alcoholic Drinks/day: rare     Family History   Problem Relation Age of Onset     Diabetes Mother      Diabetes Father      Diabetes Brother          Current Outpatient Medications   Medication Sig Dispense Refill     aspirin 81 MG EC tablet [ASPIRIN 81 MG EC TABLET] Take 81 mg by  mouth daily.       atorvastatin (LIPITOR) 40 MG tablet [ATORVASTATIN (LIPITOR) 40 MG TABLET] Take 1 tablet (40 mg total) by mouth daily. 90 tablet 3     blood glucose (NO BRAND SPECIFIED) test strip Use to test blood sugar 1 times daily or as directed. 100 strip 3     blood glucose monitoring (NO BRAND SPECIFIED) meter device kit Use to test blood sugar 1 times daily or as directed. 1 kit 0     blood glucose test (ACCU-CHEK GUIDE TEST STRIPS) strips [BLOOD GLUCOSE TEST (ACCU-CHEK GUIDE TEST STRIPS) STRIPS] Use 1 each As Directed daily. Dispense brand per patient's insurance at pharmacy discretion. 50 strip 3     cholecalciferol, vitamin D3, (VITAMIN D3) 2,000 unit capsule [CHOLECALCIFEROL, VITAMIN D3, (VITAMIN D3) 2,000 UNIT CAPSULE] Take 1,000 Units by mouth daily.       glipiZIDE (GLUCOTROL XL) 10 MG 24 hr tablet Take 1 tablet (10 mg) by mouth 2 times daily 180 tablet 3     lisinopriL-hydrochlorothiazide (PRINZIDE,ZESTORETIC) 20-12.5 mg per tablet [LISINOPRIL-HYDROCHLOROTHIAZIDE (PRINZIDE,ZESTORETIC) 20-12.5 MG PER TABLET] TAKE 1 TABLET BY MOUTH EVERY DAY 90 tablet 3     metFORMIN (GLUCOPHAGE) 1000 MG tablet Take 1 tablet (1,000 mg) by mouth 2 times daily (with meals) 180 tablet 3     No Known Allergies  Recent Labs   Lab Test 12/23/21  0921 08/24/21  0919 08/24/21  0840 04/22/21  1000 04/22/21  0922 12/22/20  0900 12/22/20  0853 08/21/20  0915 06/29/20  0912 11/15/19  0900 11/15/19  0821 07/10/19  0854 02/26/19  0756 10/24/18  1204   A1C 7.4*  --  7.0*  --  7.7*  --    < >  --    < >  --    < >  --    < >  --    LDL  --   --   --  56  --   --   --  62  --   --   --  64  --   --    HDL  --   --   --  33*  --   --   --  35*  --   --   --  34*  --   --    TRIG  --   --   --  160*  --   --   --  140  --   --   --  147  --   --    ALT  --   --   --  36  --   --   --   --   --  32  --   --   --  42   CR  --  1.38*  --  1.40*  --  1.30  --  1.24  --  1.30  --  1.31*   < > 1.19   GFRESTIMATED  --  53*  --  51*  --  55*   "--  59*  --  55*  --  55*   < > >60   GFRESTBLACK  --   --   --  >60  --  >60  --  >60  --  >60  --  >60   < > >60   POTASSIUM  --  4.7  --  4.5  --  4.1  --  4.4  --  4.7  --  4.7   < > 4.9    < > = values in this interval not displayed.        Elects Pfizer COVID-19 3rd shot booster.  Wants to wait on HD flu shot currently until ensure tolerate COVID-19 booster shot.        Review of Systems  Constitutional, HEENT, cardiovascular, pulmonary, GI, , musculoskeletal, neuro, skin, endocrine and psych systems are negative, except as otherwise noted.    OBJECTIVE:   /70   Pulse 76   Ht 1.715 m (5' 7.5\")   Wt 93 kg (205 lb)   SpO2 93%   BMI 31.63 kg/m   Estimated body mass index is 31.63 kg/m  as calculated from the following:    Height as of this encounter: 1.715 m (5' 7.5\").    Weight as of this encounter: 93 kg (205 lb).     Physical Exam  GENERAL: healthy, alert and no distress  EYES: Eyes grossly normal to inspection, PERRL and conjunctivae and sclerae normal  HENT: ear canals and TM's normal, nose and mouth without ulcers or lesions  NECK: no adenopathy, no asymmetry, masses, or scars and thyroid normal to palpation  RESP: lungs clear to auscultation - no rales, rhonchi or wheezes  CV: regular rate and rhythm, normal S1 S2, no S3 or S4, no murmur, click or rub, no peripheral edema and peripheral pulses strong  ABDOMEN: soft, nontender, no hepatosplenomegaly, no masses and bowel sounds normal   (male): normal male genitalia without lesions or urethral discharge, no hernia  RECTAL: normal sphincter tone, no rectal masses, prostate normal size, smooth, nontender without nodules or masses  MS: no gross musculoskeletal defects noted, no edema  SKIN: no suspicious lesions or rashes  NEURO: Normal strength and tone, mentation intact and speech normal  PSYCH: mentation appears normal, affect normal/bright    Diagnostic Test Results:  Labs reviewed in Epic  Results for orders placed or performed in visit on " 12/23/21 (from the past 24 hour(s))   Hemoglobin A1c   Result Value Ref Range    Hemoglobin A1C 7.4 (H) 0.0 - 5.6 %   Extra Tube    Narrative    The following orders were created for panel order Extra Tube.  Procedure                               Abnormality         Status                     ---------                               -----------         ------                     Extra Red Top Tube[[id number]]                               In process                 Extra Purple Top Tube[[id number]]                            In process                   Please view results for these tests on the individual orders.       ASSESSMENT / PLAN:     Encounter for general adult medical examination with abnormal findings  Annual wellness visit completed.  Risk questionnaire reviewed in detail.  Annual wellness visits to continue.  - REVIEW OF HEALTH MAINTENANCE PROTOCOL ORDERS    Type 2 diabetes mellitus with retinopathy, without long-term current use of insulin, macular edema presence unspecified, unspecified laterality, unspecified retinopathy severity (H)  Type 2 diabetes with slight increase from 7% up to 7.4% since August 24, 2019.  We will continue Metformin 1000 mg twice daily and glipizide XL 10 mg twice daily.  Tolerating well without hypoglycemic symptoms.  Weight goal remains less than 200 pounds initially which she has struggled achieving however less than 190 pounds ideally.  Recent foot exam today was normal.  Diabetic eye exams on annual basis to continue.  Microalbumin screen was normal August 24, 2021.  Diabetes recheck in office in 4 months.  Continues aspirin 81 mg daily.  - Hemoglobin A1c  - Comprehensive metabolic panel    Class 1 obesity due to excess calories with serious comorbidity and body mass index (BMI) of 31.0 to 31.9 in adult  Dietary and exercise modifications as noted above for weight goal less than 200 pounds initially, less than 190 pounds ideally.    Encounter for immunization  Pneumovax  immunization provided today.  Patient declines flu shot at this time however will consider receiving in the next week or 2.  - PPSV23, IM/SUBQ (2+ YRS) - [id number]    Essential hypertension  Hypertension well controlled continuing lisinopril hydrochlorothiazide 20/12.5 using 1 tablet daily.  - Comprehensive metabolic panel    Mixed hyperlipidemia  Hyperlipidemia.  Atorvastatin 40 mg daily continues.  - Lipid panel reflex to direct LDL Fasting    Stage 3a chronic kidney disease (H)  CKD stage IIIa.  Ensure stable renal function.  Ensure adequate hydration.  - Comprehensive metabolic panel    Asymmetrical sensorineural hearing loss  Sensorineural hearing loss noted.  Has seen audiologist previously.  Not utilizing hearing aids currently.    Tubular adenoma of colon  History of tubular adenoma of colon.  Recent colonoscopy September 26, 2019 with hyperplastic polyp and will repeat at 5-year interval.    Hyperbilirubinemia  History of mild bilirubin elevation with prior bilirubin 1.3 and will repeat today and ensure no evidence for hemolysis etc.  - Comprehensive metabolic panel  - CBC with platelets    Screening for prostate cancer  PSA for prostate cancer screening based on age criteria.  - Prostate Specific Antigen Screen    High priority for 2019-nCoV vaccine  Third shot Pfizer COVID-19 booster provided  - COVID-19,PF,PFIZER (12+ Yrs PURPLE LABEL)     Vitamin D deficiency  Check vitamin D level today.  Patient feels like he is likely using 2000 units daily but will confirm 1000 units versus 2000 units daily.    Patient has been advised of split billing requirements and indicates understanding: No  COUNSELING:  Reviewed preventive health counseling, as reflected in patient instructions       Regular exercise       Healthy diet/nutrition       Vision screening       Hearing screening       Dental care       Bladder control       Fall risk prevention       Aspirin prophylaxis        Colon cancer screening        "Prostate cancer screening    Estimated body mass index is 31.63 kg/m  as calculated from the following:    Height as of this encounter: 1.715 m (5' 7.5\").    Weight as of this encounter: 93 kg (205 lb).    Weight management plan: Discussed healthy diet and exercise guidelines    He reports that he has been smoking cigarettes. He has never used smokeless tobacco.  Tobacco Cessation Action Plan:   Information offered: Patient not interested at this time      Appropriate preventive services were discussed with this patient, including applicable screening as appropriate for cardiovascular disease, diabetes, osteopenia/osteoporosis, and glaucoma.  As appropriate for age/gender, discussed screening for colorectal cancer, prostate cancer, breast cancer, and cervical cancer. Checklist reviewing preventive services available has been given to the patient.    Reviewed patients plan of care and provided an AVS. The Intermediate Care Plan ( asthma action plan, low back pain action plan, and migraine action plan) for Clif meets the Care Plan requirement. This Care Plan has been established and reviewed with the Patient.    Counseling Resources:  ATP IV Guidelines  Pooled Cohorts Equation Calculator  Breast Cancer Risk Calculator  Breast Cancer: Medication to Reduce Risk  FRAX Risk Assessment  ICSI Preventive Guidelines  Dietary Guidelines for Americans, 2010  [employer]'s MyPlate  ASA Prophylaxis  Lung CA Screening    Mark Lin MD  [facility]    Identified Health Risks:    He is at risk for lack of exercise and has been provided with information to increase physical activity for the benefit of his well-being.  The patient was counseled and encouraged to consider modifying their diet and eating habits. He was provided with information on recommended healthy diet options.  The patient was provided with written information regarding signs of hearing loss.  "

## 2021-12-23 NOTE — LETTER
December 26, 2021      Clif Christie  [address] [place] [address]        Dear ,    We are writing to inform you of your test results.    Continue your current diabetes management as discussed in order to further improve.  Goal A1c remains < 7.0% ideally.     Your kidney and liver tests were otherwise fine.    Your complete blood count results were normal.  No evidence for anemia, etc.     Your cholesterol results are at goal.  Continue your current medication as discussed.     Your prostate cancer screening test (i.e.PSA) was only mildly elevated.  No significant concern.  This can happen for a number of reasons other than prostate cancer concern.  Due to some normal fluctuation in PSA levels, I would recommend that we repeat this test at your follow-up diabetes visit to ensure stable or improvement.  I begin to become more concerned if the PSA level is > 10.    Your vitamin D level remains at the low end of normal.  Please confirm your current dose of vitamin D (1,000 units vs 2,000 units daily) then I would suggest doubling that dose (up to 2,000 units vs. 4,000 units daily) in order to further improve.  We will plan to recheck your labs in the next at your follow-up diabetes office visit in order to ensure desired improvement.      Resulted Orders   Hemoglobin A1c   Result Value Ref Range    Hemoglobin A1C 7.4 (H) 0.0 - 5.6 %      Comment:      Normal <5.7%   Prediabetes 5.7-6.4%    Diabetes 6.5% or higher     Note: Adopted from ADA consensus guidelines.   Comprehensive metabolic panel   Result Value Ref Range    Sodium 141 136 - 145 mmol/L    Potassium 4.1 3.5 - 5.0 mmol/L    Chloride 102 98 - 107 mmol/L    Carbon Dioxide (CO2) 25 22 - 31 mmol/L    Anion Gap 14 5 - 18 mmol/L    Urea Nitrogen 20 8 - 22 mg/dL    Creatinine 1.25 0.70 - 1.30 mg/dL    Calcium 10.0 8.5 - 10.5 mg/dL    Glucose 148 (H) 70 - 125 mg/dL    Alkaline Phosphatase 76 45 - 120 U/L    AST 17 0 - 40 U/L    ALT  32 0 - 45 U/L    Protein Total 6.8 6.0 - 8.0 g/dL    Albumin 3.9 3.5 - 5.0 g/dL    Bilirubin Total 1.2 (H) 0.0 - 1.0 mg/dL    GFR Estimate 63 >60 mL/min/1.73m2      Comment:      Effective December 21, 2021 eGFRcr in adults is calculated using the 2021 CKD-EPI creatinine equation which includes age and gender (Lorenzo et al., NE, DOI: 10.[facility]6/WBIPju0719171)   CBC with platelets   Result Value Ref Range    WBC Count 8.1 4.0 - 11.0 10e3/uL    RBC Count 5.36 4.40 - 5.90 10e6/uL    Hemoglobin 15.8 13.3 - 17.7 g/dL    Hematocrit 46.0 40.0 - 53.0 %    MCV 86 78 - 100 fL    MCH 29.5 26.5 - 33.0 pg    MCHC 34.3 31.5 - 36.5 g/dL    RDW 12.6 10.0 - 15.0 %    Platelet Count 296 150 - 450 10e3/uL   Lipid panel reflex to direct LDL Fasting   Result Value Ref Range    Cholesterol 119 <=199 mg/dL    Triglycerides 145 <=149 mg/dL    Direct Measure HDL 37 (L) >=40 mg/dL      Comment:      HDL Cholesterol Reference Range:     0-2 years:   No reference ranges established for patients under 2 years old  at [facility] for lipid analytes.    2-8 years:  Greater than 45 mg/dL     18 years and older:   Female: Greater than or equal to 50 mg/dL   Male:   Greater than or equal to 40 mg/dL    LDL Cholesterol Calculated 53 <=129 mg/dL    Patient Fasting > 8hrs? Yes    Prostate Specific Antigen Screen   Result Value Ref Range    Prostate Specific Antigen Screen 4.56 (H) 0.00 - 4.50 ug/L    Narrative    Assay Method is Abbott Prostate-Specific Antigen (PSA)  Standard-WHO 1st International (90:10)   Vitamin D Deficiency   Result Value Ref Range    Vitamin D, Total (25-Hydroxy) 36 30 - 80 ug/L    Narrative    Deficiency <10.0 ug/L  Insufficiency 10.0-29.9 ug/L  Sufficiency 30.0-80.0 ug/L  Toxicity (possible) >100.0 ug/L        If you have any questions or concerns, please call the clinic at the number listed above.       Sincerely,      Mark Lin MD

## 2021-12-23 NOTE — PATIENT INSTRUCTIONS
Patient Education   Personalized Prevention Plan  You are due for the preventive services outlined below.  Your care team is available to assist you in scheduling these services.  If you have already completed any of these items, please share that information with your care team to update in your medical record.  Health Maintenance Due   Topic Date Due     Diabetic Foot Exam  Never done     ANNUAL REVIEW OF HM ORDERS  Never done     Hemoglobin  Never done     LUNG CANCER SCREENING  Never done     Flu Vaccine (1) Never done     COVID-19 Vaccine (3 - Booster for Pfizer series) 11/13/2021     FALL RISK ASSESSMENT  12/22/2021       Exercise for a Healthier Heart  You may wonder how you can improve the health of your heart. If you re thinking about exercise, you re on the right track. You don t need to become an athlete. But you do need a certain amount of brisk exercise to help strengthen your heart. If you have been diagnosed with a heart condition, your healthcare provider may advise exercise to help stabilize your condition. To help make exercise a habit, choose safe, fun activities.      Exercise with a friend. When activity is fun, you're more likely to stick with it.   Before you start  Check with your healthcare provider before starting an exercise program. This is especially important if you have not been active for a while. It's also important if you have a long-term (chronic) health problem such as heart disease, diabetes, or obesity. Or if you are at high risk for having these problems.   Why exercise?  Exercising regularly offers many healthy rewards. It can help you do all of the following:     Improve your blood cholesterol level to help prevent further heart trouble    Lower your blood pressure to help prevent a stroke or heart attack    Control diabetes, or reduce your risk of getting this disease    Improve your heart and lung function    Reach and stay at a healthy weight    Make your muscles  stronger so you can stay active    Prevent falls and fractures by slowing the loss of bone mass (osteoporosis)    Manage stress better    Reduce your blood pressure    Improve your sense of self and your body image  Exercise tips      Ease into your routine. Set small goals. Then build on them. If you are not sure what your activity level should be, talk with your healthcare provider first before starting an exercise routine.    Exercise on most days. Aim for a total of 150 minutes (2 hours and 30 minutes) or more of moderate-intensity aerobic activity each week. Or 75 minutes (1 hour and 15 minutes) or more of vigorous-intensity aerobic activity each week. Or try for a combination of both. Moderate activity means that you breathe heavier and your heart rate increases but you can still talk. Think about doing 40 minutes of moderate exercise, 3 to 4 times a week. For best results, activity should last for about 40 minutes to lower blood pressure and cholesterol. It's OK to work up to the 40-minute period over time. Examples of moderate-intensity activity are walking 1 mile in 15 minutes. Or doing 30 to 45 minutes of yard work.    Step up your daily activity level.  Along with your exercise program, try being more active the whole day. Walk instead of drive. Or park further away so that you take more steps each day. Do more household tasks or yard work. You may not be able to meet the advised mount of physical activity. But doing some moderate- or vigorous-intensity aerobic activity can help reduce your risk for heart disease. Your healthcare provider can help you figure out what is best for you.    Choose 1 or more activities you enjoy.  Walking is one of the easiest things you can do. You can also try swimming, riding a bike, dancing, or taking an exercise class.    When to call your healthcare provider  Call your healthcare provider if you have any of these:     Chest pain or feel dizzy or lightheaded    Burning,  tightness, pressure, or heaviness in your chest, neck, shoulders, back, or arms    Abnormal shortness of breath    More joint or muscle pain    A very fast or irregular heartbeat (palpitations)  [employer] last reviewed this educational content on 7/1/2019 2000-2021 [employer]. All rights reserved. This information is not intended as a substitute for professional medical care. Always follow your healthcare professional's instructions.          Understanding USDA MyPlate  The USDA has guidelines to help you make healthy food choices. These are called MyPlate. MyPlate shows the food groups that make up healthy meals using the image of a place setting. Before you eat, think about the healthiest choices for what to put on your plate or in your cup or bowl. To learn more about building a healthy plate, visit www.choosemyplate.gov.    The food groups    Fruits. Any fruit or 100% fruit juice counts as part of the Fruit Group. Fruits may be fresh, canned, frozen, or dried, and may be whole, cut-up, or pureed. Make 1/2 of your plate fruits and vegetables.    Vegetables. Any vegetable or 100% vegetable juice counts as a member of the Vegetable Group. Vegetables may be fresh, frozen, canned, or dried. They can be served raw or cooked and may be whole, cut-up, or mashed. Make 1/2 of your plate fruits and vegetables.    Grains. All foods made from grains are part of the Grains Group. These include wheat, rice, oats, cornmeal, and barley. Grains are often used to make foods such as bread, pasta, oatmeal, cereal, tortillas, and grits. Grains should be no more than 1/4 of your plate. At least half of your grains should be whole grains.    Protein. This group includes meat, poultry, seafood, beans and peas, eggs, processed soy products (such as tofu), nuts (including nut butters), and seeds. Make protein choices no more than 1/4 of your plate. Meat and poultry choices should be lean or low fat.    Dairy. The Dairy  Group includes all fluid milk products and foods made from milk that contain calcium, such as yogurt and cheese. (Foods that have little calcium, such as cream, butter, and cream cheese, are not part of this group.) Most dairy choices should be low-fat or fat-free.    Oils. Oils aren't a food group, but they do contain essential nutrients. However it's important to watch your intake of oils. These are fats that are liquid at room temperature. They include canola, corn, olive, soybean, vegetable, and sunflower oil. Foods that are mainly oil include mayonnaise, certain salad dressings, and soft margarines. You likely already get your daily oil allowance from the foods you eat.  Things to limit  Eating healthy also means limiting these things in your diet:       Salt (sodium). Many processed foods have a lot of sodium. To keep sodium intake down, eat fresh vegetables, meats, poultry, and seafood when possible. Purchase low-sodium, reduced-sodium, or no-salt-added food products at the store. And don't add salt to your meals at home. Instead, season them with herbs and spices such as dill, oregano, cumin, and paprika. Or try adding flavor with lemon or lime zest and juice.    Saturated fat. Saturated fats are most often found in animal products such as beef, pork, and chicken. They are often solid at room temperature, such as butter. To reduce your saturated fat intake, choose leaner cuts of meat and poultry. And try healthier cooking methods such as grilling, broiling, roasting, or baking. For a simple lower-fat swap, use plain nonfat yogurt instead of mayonnaise when making potato salad or macaroni salad.    Added sugars. These are sugars added to foods. They are in foods such as ice cream, candy, soda, fruit drinks, sports drinks, energy drinks, cookies, pastries, jams, and syrups. Cut down on added sugars by sharing sweet treats with a family member or friend. You can also choose fruit for dessert, and drink water or  other unsweetened beverages.     [employer] last reviewed this educational content on 6/1/2020 2000-2021 [employer]. All rights reserved. This information is not intended as a substitute for professional medical care. Always follow your healthcare professional's instructions.          Signs of Hearing Loss      Hearing much better with one ear can be a sign of hearing loss.   Hearing loss is a problem shared by many people. In fact, it is one of the most common health problems, particularly as people age. Most people age 65 and older have some hearing loss. By age 80, almost everyone does. Hearing loss often occurs slowly over the years. So you may not realize your hearing has gotten worse.  Have your hearing checked  Call your healthcare provider if you:    Have to strain to hear normal conversation    Have to watch other people s faces very carefully to follow what they re saying    Need to ask people to repeat what they ve said    Often misunderstand what people are saying    Turn the volume of the television or radio up so high that others complain    Feel that people are mumbling when they re talking to you    Find that the effort to hear leaves you feeling tired and irritated    Notice, when using the phone, that you hear better with one ear than the other  [employer] last reviewed this educational content on 1/1/2020 2000-2021 [employer]. All rights reserved. This information is not intended as a substitute for professional medical care. Always follow your healthcare professional's instructions.

## 2021-12-24 LAB — DEPRECATED CALCIDIOL+CALCIFEROL SERPL-MC: 36 UG/L (ref 30–80)

## 2022-01-14 DIAGNOSIS — I10 ESSENTIAL HYPERTENSION: ICD-10-CM

## 2022-01-14 RX ORDER — LISINOPRIL AND HYDROCHLOROTHIAZIDE 12.5; 2 MG/1; MG/1
TABLET ORAL
Qty: 90 TABLET | Refills: 3 | Status: SHIPPED | OUTPATIENT
Start: 2022-01-14 | End: 2023-01-16

## 2022-01-20 DIAGNOSIS — E78.2 MIXED HYPERLIPIDEMIA: ICD-10-CM

## 2022-01-20 RX ORDER — ATORVASTATIN CALCIUM 40 MG/1
40 TABLET, FILM COATED ORAL DAILY
Qty: 90 TABLET | Refills: 3 | Status: SHIPPED | OUTPATIENT
Start: 2022-01-20 | End: 2023-01-15

## 2022-04-26 ENCOUNTER — OFFICE VISIT (OUTPATIENT)
Dept: FAMILY MEDICINE | Facility: CLINIC | Age: 67
End: 2022-04-26
Payer: COMMERCIAL

## 2022-04-26 VITALS
SYSTOLIC BLOOD PRESSURE: 110 MMHG | OXYGEN SATURATION: 94 % | TEMPERATURE: 97.9 F | WEIGHT: 204 LBS | BODY MASS INDEX: 31.48 KG/M2 | DIASTOLIC BLOOD PRESSURE: 68 MMHG | HEART RATE: 94 BPM

## 2022-04-26 DIAGNOSIS — Z23 HIGH PRIORITY FOR 2019-NCOV VACCINE: ICD-10-CM

## 2022-04-26 DIAGNOSIS — I10 ESSENTIAL HYPERTENSION: ICD-10-CM

## 2022-04-26 DIAGNOSIS — N18.31 STAGE 3A CHRONIC KIDNEY DISEASE (H): ICD-10-CM

## 2022-04-26 DIAGNOSIS — E66.811 CLASS 1 OBESITY DUE TO EXCESS CALORIES WITH SERIOUS COMORBIDITY AND BODY MASS INDEX (BMI) OF 31.0 TO 31.9 IN ADULT: ICD-10-CM

## 2022-04-26 DIAGNOSIS — E78.2 MIXED HYPERLIPIDEMIA: ICD-10-CM

## 2022-04-26 DIAGNOSIS — E55.9 VITAMIN D DEFICIENCY: ICD-10-CM

## 2022-04-26 DIAGNOSIS — E11.319 TYPE 2 DIABETES MELLITUS WITH RETINOPATHY, WITHOUT LONG-TERM CURRENT USE OF INSULIN, MACULAR EDEMA PRESENCE UNSPECIFIED, UNSPECIFIED LATERALITY, UNSPECIFIED RETINOPATHY SEVERITY (H): Primary | ICD-10-CM

## 2022-04-26 DIAGNOSIS — R97.20 ELEVATED PROSTATE SPECIFIC ANTIGEN (PSA): ICD-10-CM

## 2022-04-26 DIAGNOSIS — E80.6 HYPERBILIRUBINEMIA: ICD-10-CM

## 2022-04-26 DIAGNOSIS — E66.09 CLASS 1 OBESITY DUE TO EXCESS CALORIES WITH SERIOUS COMORBIDITY AND BODY MASS INDEX (BMI) OF 31.0 TO 31.9 IN ADULT: ICD-10-CM

## 2022-04-26 LAB
ALBUMIN SERPL-MCNC: 3.9 G/DL (ref 3.5–5)
ALP SERPL-CCNC: 70 U/L (ref 45–120)
ALT SERPL W P-5'-P-CCNC: 38 U/L (ref 0–45)
ANION GAP SERPL CALCULATED.3IONS-SCNC: 12 MMOL/L (ref 5–18)
AST SERPL W P-5'-P-CCNC: 19 U/L (ref 0–40)
BILIRUB SERPL-MCNC: 1.2 MG/DL (ref 0–1)
BUN SERPL-MCNC: 21 MG/DL (ref 8–22)
CALCIUM SERPL-MCNC: 9.9 MG/DL (ref 8.5–10.5)
CHLORIDE BLD-SCNC: 101 MMOL/L (ref 98–107)
CO2 SERPL-SCNC: 27 MMOL/L (ref 22–31)
CREAT SERPL-MCNC: 1.43 MG/DL (ref 0.7–1.3)
CREAT UR-MCNC: 499 MG/DL
GFR SERPL CREATININE-BSD FRML MDRD: 54 ML/MIN/1.73M2
GLUCOSE BLD-MCNC: 213 MG/DL (ref 70–125)
HBA1C MFR BLD: 7.4 % (ref 0–5.6)
HOLD SPECIMEN: NORMAL
MICROALBUMIN UR-MCNC: 7.74 MG/DL (ref 0–1.99)
MICROALBUMIN/CREAT UR: 15.5 MG/G CR
POTASSIUM BLD-SCNC: 4.5 MMOL/L (ref 3.5–5)
PROT SERPL-MCNC: 6.6 G/DL (ref 6–8)
PSA SERPL-MCNC: 4.73 UG/L (ref 0–4.5)
SODIUM SERPL-SCNC: 140 MMOL/L (ref 136–145)

## 2022-04-26 PROCEDURE — 91305 COVID-19,PF,PFIZER (12+ YRS): CPT | Performed by: FAMILY MEDICINE

## 2022-04-26 PROCEDURE — 82043 UR ALBUMIN QUANTITATIVE: CPT | Performed by: FAMILY MEDICINE

## 2022-04-26 PROCEDURE — 0054A COVID-19,PF,PFIZER (12+ YRS): CPT | Performed by: FAMILY MEDICINE

## 2022-04-26 PROCEDURE — 83036 HEMOGLOBIN GLYCOSYLATED A1C: CPT | Performed by: FAMILY MEDICINE

## 2022-04-26 PROCEDURE — 99214 OFFICE O/P EST MOD 30 MIN: CPT | Performed by: FAMILY MEDICINE

## 2022-04-26 PROCEDURE — 84153 ASSAY OF PSA TOTAL: CPT | Performed by: FAMILY MEDICINE

## 2022-04-26 PROCEDURE — 82306 VITAMIN D 25 HYDROXY: CPT | Performed by: FAMILY MEDICINE

## 2022-04-26 PROCEDURE — 80053 COMPREHEN METABOLIC PANEL: CPT | Performed by: FAMILY MEDICINE

## 2022-04-26 PROCEDURE — 36415 COLL VENOUS BLD VENIPUNCTURE: CPT | Performed by: FAMILY MEDICINE

## 2022-04-26 ASSESSMENT — PATIENT HEALTH QUESTIONNAIRE - PHQ9
10. IF YOU CHECKED OFF ANY PROBLEMS, HOW DIFFICULT HAVE THESE PROBLEMS MADE IT FOR YOU TO DO YOUR WORK, TAKE CARE OF THINGS AT HOME, OR GET ALONG WITH OTHER PEOPLE: NOT DIFFICULT AT ALL
SUM OF ALL RESPONSES TO PHQ QUESTIONS 1-9: 3
SUM OF ALL RESPONSES TO PHQ QUESTIONS 1-9: 3

## 2022-04-26 NOTE — PROGRESS NOTES
Assessment/Plan:    Type 2 diabetes mellitus with retinopathy, without long-term current use of insulin, macular edema presence unspecified, unspecified laterality, unspecified retinopathy severity (H)  Type 2 diabetes remains stable at 7.4% and continues metformin 1000 mg twice daily and glipizide XL 10 mg twice daily.  No hypoglycemic episodes described.  Microalbumin screen updated today.  Weight goal remains less than 200 pounds initially, less than 190 pounds ideally.  Reassess at follow-up in 4 months.  - Hemoglobin A1c  - Hemoglobin A1c  - Albumin Random Urine Quantitative with Creat Ratio  - Comprehensive metabolic panel  - Comprehensive metabolic panel    Essential hypertension  Hypertension well controlled with lisinopril hydrochlorothiazide 20/12.5 using 1 tablet daily.  - Comprehensive metabolic panel  - Comprehensive metabolic panel    Mixed hyperlipidemia  Remains on high intensity statin therapy with atorvastatin 40 mg daily.    Stage 3a chronic kidney disease (H)  CKD stage IIIa historically.  Update microalbumin screen.  Med monitoring completed.  Avoid NSAIDs other than aspirin 81 mg daily with underlying type 2 diabetes.  - Albumin Random Urine Quantitative with Creat Ratio  - Comprehensive metabolic panel  - Comprehensive metabolic panel    Class 1 obesity due to excess calories with serious comorbidity and body mass index (BMI) of 31.0 to 31.9 in adult  Dietary and exercise modification for weight goal remaining less than 200 pounds initially, less than 190 pounds ideally.    High priority for 2019-nCoV vaccine  Covid-19 Pfizer second booster provided today.  - COVID-19,PF,PFIZER (12+ Yrs GRAY LABEL)    Elevated prostate specific antigen (PSA)  Mild PSA elevation at wellness visit December 23, 2021 we will update PSA to ensure stable or desired improvement.  - PSA tumor marker  - PSA tumor marker    Vitamin D deficiency  Patient to clarify whether he is using 1000 unit or 2000 units vitamin D  "supplement in which he then takes 2 capsules once daily.  - Vitamin D Deficiency  - Vitamin D Deficiency    Hyperbilirubinemia  Mild bilirubin elevation consistent with Gilbert's syndrome.  Update comprehensive metabolic panel.  - Comprehensive metabolic panel  - Comprehensive metabolic panel          Subjective:    Clif Christie is seen today for follow-up assessment.  Type 2 diabetes continuing metformin 1000 mg twice daily and glipizide XL 10 mg twice daily.  Aspirin 81 mg daily continues as well.  Lisinopril hydrochlorothiazide 20/12.5 daily for hypertension while using atorvastatin 40 mg daily for lipid management.  Is on vitamin D supplement but unclear dose currently whether its 2000 units or 4000 units total dose daily.  Patient to confirm.  Has had mild bilirubin elevation historically consistent with Gilbert's syndrome.  Most recent PSA was mildly elevated at 4.56.  Denies recent illness.  Needs second booster for COVID-19 Pfizer vaccine series.  Comprehensive review of systems as above otherwise all negative.    S.O. 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)   < 1/2 ppd - quit 12/08 (occasional cheat with cigarette...) - quit 3/13/13 again   Infrequent EtOH   No surgeries   No hospitalizations      History reviewed. No pertinent surgical history.     Family History   Problem Relation Age of Onset     Diabetes Mother      Diabetes Father      Diabetes Brother         Past Medical History:   Diagnosis Date     Diabetes mellitus (H)      Hypertension         Social History     Tobacco Use     Smoking status: Light Tobacco Smoker     Types: Cigarettes     Smokeless tobacco: Never Used   Substance Use Topics     Alcohol use: Yes     Comment: Alcoholic Drinks/day: rare     Drug use: No        Current Outpatient Medications   Medication " Sig Dispense Refill     aspirin 81 MG EC tablet [ASPIRIN 81 MG EC TABLET] Take 81 mg by mouth daily.       atorvastatin (LIPITOR) 40 MG tablet Take 1 tablet (40 mg) by mouth daily 90 tablet 3     blood glucose (NO BRAND SPECIFIED) test strip Use to test blood sugar 1 times daily or as directed. 100 strip 3     blood glucose monitoring (NO BRAND SPECIFIED) meter device kit Use to test blood sugar 1 times daily or as directed. 1 kit 0     blood glucose test (ACCU-CHEK GUIDE TEST STRIPS) strips [BLOOD GLUCOSE TEST (ACCU-CHEK GUIDE TEST STRIPS) STRIPS] Use 1 each As Directed daily. Dispense brand per patient's insurance at pharmacy discretion. 50 strip 3     cholecalciferol, vitamin D3, (VITAMIN D3) 2,000 unit capsule [CHOLECALCIFEROL, VITAMIN D3, (VITAMIN D3) 2,000 UNIT CAPSULE] Take 1,000 Units by mouth daily.       glipiZIDE (GLUCOTROL XL) 10 MG 24 hr tablet Take 1 tablet (10 mg) by mouth 2 times daily 180 tablet 3     lisinopril-hydrochlorothiazide (ZESTORETIC) 20-12.5 MG tablet [LISINOPRIL-HYDROCHLOROTHIAZIDE (PRINZIDE,ZESTORETIC) 20-12.5 MG PER TABLET] TAKE 1 TABLET BY MOUTH EVERY DAY 90 tablet 3     metFORMIN (GLUCOPHAGE) 1000 MG tablet Take 1 tablet (1,000 mg) by mouth 2 times daily (with meals) 180 tablet 3          Objective:    Vitals:    04/26/22 0926   BP: 110/68   Pulse: 94   Temp: 97.9  F (36.6  C)   SpO2: 94%   Weight: 92.5 kg (204 lb)      Body mass index is 31.48 kg/m .    Alert.  No apparent distress.  Chest clear.  Cardiac exam regular.  Extremities warm and dry.      This note has been dictated using voice recognition software and as a result may contain minor grammatical errors and unintended word substitutions.     Answers for HPI/ROS submitted by the patient on 4/26/2022  Are you regularly taking any medication or supplement to lower your cholesterol?: Yes  Are you having muscle aches or other side effects that you think could be caused by your cholesterol lowering medication?: No  If you checked  off any problems, how difficult have these problems made it for you to do your work, take care of things at home, or get along with other people?: Not difficult at all  PHQ9 TOTAL SCORE: 3  Do you check your blood pressure regularly outside of the clinic?: No  Are your blood pressures ever more than 140 on the top number (systolic) OR more than 90 on the bottom number (diastolic)? (For example, greater than 140/90): No  Are you following a low salt diet?: Yes  Frequency of checking blood sugars:: a few times a month  What time of day are you checking your blood sugars : before meals  Have you had any blood sugars above 200?: Yes  Have you had any blood sugars below 70?: No  Hypoglycemia symptoms:: none  Diabetic concerns:: none  Paraesthesia present:: none of these symptoms  How many servings of fruits and vegetables do you eat daily?: 0-1  On average, how many sweetened beverages do you drink each day (Examples: soda, juice, sweet tea, etc.  Do NOT count diet or artificially sweetened beverages)?: 1  How many minutes a day do you exercise enough to make your heart beat faster?: 9 or less  How many days a week do you exercise enough to make your heart beat faster?: 3 or less  How many days per week do you miss taking your medication?: 0

## 2022-04-26 NOTE — LETTER
April 28, 2022      Clif Christie  [address] [place] [address]        Dear ,    We are writing to inform you of your test results.    Continue your current diabetes management as discussed in order to further improve.  Goal A1c remains < 7.0% ideally.     Your vitamin D level is normal.  Continue your current management.     Your kidney function remains mildly reduced.  It appears stable.  Ensure adequate hydration.    Your prostate cancer screening test (i.e.PSA) remains mildly elevated.  It appears relatively stable.  Plan to repeat in 3-6 months to ensure stable or improved.    Your urine screen was normal.  No evidence for significant protein in your urine.  We will plan to repeat this test on a yearly basis.     Resulted Orders   Hemoglobin A1c   Result Value Ref Range    Hemoglobin A1C 7.4 (H) 0.0 - 5.6 %      Comment:      Normal <5.7%   Prediabetes 5.7-6.4%    Diabetes 6.5% or higher     Note: Adopted from ADA consensus guidelines.   Vitamin D Deficiency   Result Value Ref Range    Vitamin D, Total (25-Hydroxy) 56 20 - 75 ug/L    Narrative    Season, race, dietary intake, and treatment affect the concentration of 25-hydroxy-Vitamin D. Values may decrease during winter months and increase during summer months. Values 20-29 ug/L may indicate Vitamin D insufficiency and values <20 ug/L may indicate Vitamin D deficiency.    Vitamin D determination is routinely performed by an immunoassay specific for 25 hydroxyvitamin D3.  If an individual is on vitamin D2(ergocalciferol) supplementation, please specify 25 OH vitamin D2 and D3 level determination by LCMSMS test VITD23.     Comprehensive metabolic panel   Result Value Ref Range    Sodium 140 136 - 145 mmol/L    Potassium 4.5 3.5 - 5.0 mmol/L    Chloride 101 98 - 107 mmol/L    Carbon Dioxide (CO2) 27 22 - 31 mmol/L    Anion Gap 12 5 - 18 mmol/L    Urea Nitrogen 21 8 - 22 mg/dL    Creatinine 1.43 (H) 0.70 - 1.30 mg/dL    Calcium  9.9 8.5 - 10.5 mg/dL    Glucose 213 (H) 70 - 125 mg/dL    Alkaline Phosphatase 70 45 - 120 U/L    AST 19 0 - 40 U/L    ALT 38 0 - 45 U/L    Protein Total 6.6 6.0 - 8.0 g/dL    Albumin 3.9 3.5 - 5.0 g/dL    Bilirubin Total 1.2 (H) 0.0 - 1.0 mg/dL    GFR Estimate 54 (L) >60 mL/min/1.73m2      Comment:      Effective December 21, 2021 eGFRcr in adults is calculated using the 2021 CKD-EPI creatinine equation which includes age and gender (Lorenzo et al., NEJ, DOI: 10.1056/AZIOcq4489628)   PSA tumor marker   Result Value Ref Range    PSA Tumor Marker 4.73 (H) 0.00 - 4.50 ug/L    Narrative    Assay Method is Abbott Prostate-Specific Antigen (PSA)  Standard-WHO 1st International (90:10)   Albumin Random Urine Quantitative with Creat Ratio   Result Value Ref Range    Microalbumin Urine mg/dL 7.74 (H) 0.00 - 1.99 mg/dL    Creatinine Urine mg/dL 499 mg/dL    Microalbumin Urine mg/g Cr 15.5 <=19.9 mg/g Cr    Narrative    Microalbumin, Random Urine   <2.0 mg/dL . . . . . . . . Normal   3.0-30.0 mg/dL . . . . . . Microalbuminuria   >30.0 mg/dL . . . . . .  . Clinical Proteinuria     Microalbumin/Creatinine Ratio, Random Urine   <20 mg/g . . . . .. . . . Normal    mg/g . . . . . . . Microalbuminuria   >300 mg/g . . . . . . . . Clinical Proteinuria       If you have any questions or concerns, please call the clinic at the number listed above.       Sincerely,      Mark Lin MD

## 2022-04-27 LAB — DEPRECATED CALCIDIOL+CALCIFEROL SERPL-MC: 56 UG/L (ref 20–75)

## 2022-04-27 ASSESSMENT — PATIENT HEALTH QUESTIONNAIRE - PHQ9: SUM OF ALL RESPONSES TO PHQ QUESTIONS 1-9: 3

## 2022-06-20 ENCOUNTER — TRANSFERRED RECORDS (OUTPATIENT)
Dept: HEALTH INFORMATION MANAGEMENT | Facility: CLINIC | Age: 67
End: 2022-06-20

## 2022-06-20 LAB — RETINOPATHY: NEGATIVE

## 2022-08-19 DIAGNOSIS — E11.319 TYPE 2 DIABETES MELLITUS WITH RETINOPATHY, WITHOUT LONG-TERM CURRENT USE OF INSULIN, MACULAR EDEMA PRESENCE UNSPECIFIED, UNSPECIFIED LATERALITY, UNSPECIFIED RETINOPATHY SEVERITY (H): ICD-10-CM

## 2022-08-19 NOTE — TELEPHONE ENCOUNTER
"Last Written Prescription Date:  8/24/21  Last Fill Quantity: 180,  # refills: 3   Last office visit provider:  4/26/22     Requested Prescriptions   Pending Prescriptions Disp Refills     metFORMIN (GLUCOPHAGE) 1000 MG tablet [Pharmacy Med Name: METFORMIN HCL 1,000 MG TABLET] 180 tablet 3     Sig: TAKE 1 TABLET BY MOUTH TWICE A DAY WITH MEALS       Biguanide Agents Passed - 8/19/2022  8:50 AM        Passed - Patient is age 10 or older        Passed - Patient has documented A1c within the specified period of time.     If HgbA1C is 8 or greater, it needs to be on file within the past 3 months.  If less than 8, must be on file within the past 6 months.     Recent Labs   Lab Test 04/26/22 0933   A1C 7.4*             Passed - Patient's CR is NOT>1.4 OR Patient's EGFR is NOT<45 within past 12 mos.     Recent Labs   Lab Test 04/26/22  0933 08/24/21  0919 04/22/21  1000   GFRESTIMATED 54*   < > 51*   GFRESTBLACK  --   --  >60    < > = values in this interval not displayed.       Recent Labs   Lab Test 04/26/22 0933   CR 1.43*             Passed - Patient does NOT have a diagnosis of CHF.        Passed - Medication is active on med list        Passed - Recent (6 mo) or future (30 days) visit within the authorizing provider's specialty     Patient had office visit in the last 6 months or has a visit in the next 30 days with authorizing provider or within the authorizing provider's specialty.  See \"Patient Info\" tab in inbasket, or \"Choose Columns\" in Meds & Orders section of the refill encounter.                 Rodrick Ascencio RN 08/19/22 8:50 AM  "

## 2022-08-30 ENCOUNTER — OFFICE VISIT (OUTPATIENT)
Dept: FAMILY MEDICINE | Facility: CLINIC | Age: 67
End: 2022-08-30
Payer: COMMERCIAL

## 2022-08-30 VITALS
OXYGEN SATURATION: 94 % | HEART RATE: 96 BPM | WEIGHT: 199 LBS | BODY MASS INDEX: 30.71 KG/M2 | SYSTOLIC BLOOD PRESSURE: 90 MMHG | DIASTOLIC BLOOD PRESSURE: 60 MMHG

## 2022-08-30 DIAGNOSIS — E78.2 MIXED HYPERLIPIDEMIA: ICD-10-CM

## 2022-08-30 DIAGNOSIS — E11.319 TYPE 2 DIABETES MELLITUS WITH RETINOPATHY, WITHOUT LONG-TERM CURRENT USE OF INSULIN, MACULAR EDEMA PRESENCE UNSPECIFIED, UNSPECIFIED LATERALITY, UNSPECIFIED RETINOPATHY SEVERITY (H): Primary | ICD-10-CM

## 2022-08-30 DIAGNOSIS — N18.31 STAGE 3A CHRONIC KIDNEY DISEASE (H): ICD-10-CM

## 2022-08-30 DIAGNOSIS — R97.20 ELEVATED PROSTATE SPECIFIC ANTIGEN (PSA): ICD-10-CM

## 2022-08-30 DIAGNOSIS — I10 ESSENTIAL HYPERTENSION: ICD-10-CM

## 2022-08-30 LAB
HBA1C MFR BLD: 6.9 % (ref 0–5.6)
HOLD SPECIMEN: NORMAL

## 2022-08-30 PROCEDURE — 80048 BASIC METABOLIC PNL TOTAL CA: CPT | Performed by: FAMILY MEDICINE

## 2022-08-30 PROCEDURE — 84153 ASSAY OF PSA TOTAL: CPT | Performed by: FAMILY MEDICINE

## 2022-08-30 PROCEDURE — 36415 COLL VENOUS BLD VENIPUNCTURE: CPT | Performed by: FAMILY MEDICINE

## 2022-08-30 PROCEDURE — 99214 OFFICE O/P EST MOD 30 MIN: CPT | Performed by: FAMILY MEDICINE

## 2022-08-30 PROCEDURE — 83036 HEMOGLOBIN GLYCOSYLATED A1C: CPT | Performed by: FAMILY MEDICINE

## 2022-08-30 ASSESSMENT — PAIN SCALES - GENERAL: PAINLEVEL: NO PAIN (0)

## 2022-08-30 NOTE — PROGRESS NOTES
Assessment/Plan:    Type 2 diabetes mellitus with retinopathy, without long-term current use of insulin, macular edema presence unspecified, unspecified laterality, unspecified retinopathy severity (H)  Type 2 diabetes reviewed with improvement noted today with A1c decreasing from 7.4% down to 6.9%.  Continues metformin 1000 mg twice daily with glipizide XL 10 mg twice daily without hypoglycemic episodes.  Had seen Dr. Josué Elizalde ophthalmology with [facility] June 2022.  Follows for retinopathy findings historically.  Annual follow-up has been recommended per patient.  Patient had prior microalbumin screen remaining normal April 26, 2022.  - Hemoglobin A1c  - metFORMIN (GLUCOPHAGE) 1000 MG tablet  Dispense: 180 tablet; Refill: 3  - Basic metabolic panel    Essential hypertension  Continuing lisinopril hydrochlorothiazide 20/12.5 using 1 tablet daily.  Med monitoring completed  - Basic metabolic panel    Mixed hyperlipidemia  Continuing atorvastatin 40 mg daily for lipid management.  Lipid cascade near goal December 23, 2021.    Stage 3a chronic kidney disease (H)  History of CKD stage IIIa with prior creatinine 1.43 and GFR 54.  Ensure adequate hydration.  Avoid NSAIDs ideally.  - Basic metabolic panel    Elevated prostate specific antigen (PSA)  Repeat PSA with history of mild elevation historically.  - PSA tumor marker          Subjective:    Clif AMBROSIO Christie is seen today for follow-up assessment.  Type 2 diabetes.  Has lost 5 pounds since April 26, 2022.  Eating more fruits and less junk food.  Also decreasing quantity of food intake.  Trying to enjoy more tuna and salmon and salads.  Continues metformin 1000 mg twice daily and glipizide XL 10 mg twice daily.  No hypoglycemic episodes.  Lisinopril hydrochlorothiazide 20/12.5 daily for hypertension with atorvastatin 40 mg daily for lipid management.  History of CKD stage IIIa.  Elevated PSA mild with prior increased from 4.56 up to 4.73.  Denies recent  "illness.  Has had COVID-19 vaccine series +2 booster shots.  Comprehensive review of systems as above otherwise all negative.    S.O. 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     Family History   Problem Relation Age of Onset     Diabetes Mother      Diabetes Father      Diabetes Brother         Past Medical History:   Diagnosis Date     Diabetes mellitus (H)      Hypertension         Social History     Tobacco Use     Smoking status: Light Tobacco Smoker     Types: Cigarettes     Smokeless tobacco: Never Used   Substance Use Topics     Alcohol use: Yes     Comment: Alcoholic Drinks/day: rare     Drug use: No        Current Outpatient Medications   Medication Sig Dispense Refill     metFORMIN (GLUCOPHAGE) 1000 MG tablet TAKE 1 TABLET BY MOUTH TWICE A DAY WITH MEALS 180 tablet 3     aspirin 81 MG EC tablet [ASPIRIN 81 MG EC TABLET] Take 81 mg by mouth daily.       atorvastatin (LIPITOR) 40 MG tablet Take 1 tablet (40 mg) by mouth daily 90 tablet 3     blood glucose (NO BRAND SPECIFIED) test strip Use to test blood sugar 1 times daily or as directed. 100 strip 3     blood glucose monitoring (NO BRAND SPECIFIED) meter device kit Use to test blood sugar 1 times daily or as directed. 1 kit 0     blood glucose test (ACCU-CHEK GUIDE TEST STRIPS) strips [BLOOD GLUCOSE TEST (ACCU-CHEK GUIDE TEST STRIPS) STRIPS] Use 1 each As Directed daily. Dispense brand per patient's insurance at pharmacy discretion. 50 strip 3     cholecalciferol, vitamin D3, (VITAMIN D3) 2,000 unit capsule [CHOLECALCIFEROL, VITAMIN D3, (VITAMIN D3) 2,000 UNIT CAPSULE] Take 1,000 Units by mouth daily.       " glipiZIDE (GLUCOTROL XL) 10 MG 24 hr tablet Take 1 tablet (10 mg) by mouth 2 times daily 180 tablet 3     lisinopril-hydrochlorothiazide (ZESTORETIC) 20-12.5 MG tablet [LISINOPRIL-HYDROCHLOROTHIAZIDE (PRINZIDE,ZESTORETIC) 20-12.5 MG PER TABLET] TAKE 1 TABLET BY MOUTH EVERY DAY 90 tablet 3          Objective:    Vitals:    08/30/22 0924   BP: 90/60   Pulse: 96   SpO2: 94%   Weight: 90.3 kg (199 lb)      Body mass index is 30.71 kg/m .    Alert.  No apparent distress.  Chest clear.  Cardiac exam regular.  Extremities warm and dry.      This note has been dictated using voice recognition software and as a result may contain minor grammatical errors and unintended word substitutions.

## 2022-08-30 NOTE — LETTER
August 31, 2022      Clif Christie  [address] [place] [address]        Dear ,    We are writing to inform you of your test results.    Continue your current diabetes management as discussed in order to further improve.  Goal A1c remains < 7.0% ideally.     Mild elevation of your prostate cancer screening test continues.  Prior testing with PSA levels of 4.56 and 4.73.  Today's result was at 4.90.  These levels are only mildly elevated.  We will continue to follow closely.  Consideration for referral to see urologist in future if persistent or worsening noted.    Your kidney function remains mildly reduced.  It appears stable.  Ensure adequate hydration.  We will continue to follow closely.     Resulted Orders   Hemoglobin A1c   Result Value Ref Range    Hemoglobin A1C 6.9 (H) 0.0 - 5.6 %      Comment:      Normal <5.7%   Prediabetes 5.7-6.4%    Diabetes 6.5% or higher     Note: Adopted from ADA consensus guidelines.   PSA tumor marker   Result Value Ref Range    PSA Tumor Marker 4.90 (H) 0.00 - 4.50 ng/mL    Narrative    This result is obtained using the Roche Elecsys total PSA method on the yareli e801 immunoassay analyzer. Results obtained with different assay methods or kits cannot be used interchangeably.   Basic metabolic panel   Result Value Ref Range    Creatinine 1.32 (H) 0.67 - 1.17 mg/dL    Sodium 138 136 - 145 mmol/L    Potassium 4.0 3.4 - 5.3 mmol/L    Urea Nitrogen 24.5 (H) 8.0 - 23.0 mg/dL    Chloride 101 98 - 107 mmol/L    Carbon Dioxide (CO2) 28 22 - 29 mmol/L    Anion Gap 9 7 - 15 mmol/L    Glucose 174 (H) 70 - 99 mg/dL    GFR Estimate 59 (L) >60 mL/min/1.73m2      Comment:      Effective December 21, 2021 eGFRcr in adults is calculated using the 2021 CKD-EPI creatinine equation which includes age and gender (Lorenzo et al., NEJM, DOI: 10.1056/OYNFxk3789939)    Calcium 9.7 8.8 - 10.2 mg/dL       If you have any questions or concerns, please call the clinic at the  number listed above.       Sincerely,      Mark Lin MD

## 2022-08-31 LAB
ANION GAP SERPL CALCULATED.3IONS-SCNC: 9 MMOL/L (ref 7–15)
BUN SERPL-MCNC: 24.5 MG/DL (ref 8–23)
CALCIUM SERPL-MCNC: 9.7 MG/DL (ref 8.8–10.2)
CHLORIDE SERPL-SCNC: 101 MMOL/L (ref 98–107)
CREAT SERPL-MCNC: 1.32 MG/DL (ref 0.67–1.17)
DEPRECATED HCO3 PLAS-SCNC: 28 MMOL/L (ref 22–29)
GFR SERPL CREATININE-BSD FRML MDRD: 59 ML/MIN/1.73M2
GLUCOSE SERPL-MCNC: 174 MG/DL (ref 70–99)
POTASSIUM SERPL-SCNC: 4 MMOL/L (ref 3.4–5.3)
PSA SERPL-MCNC: 4.9 NG/ML (ref 0–4.5)
SODIUM SERPL-SCNC: 138 MMOL/L (ref 136–145)

## 2022-11-12 DIAGNOSIS — E11.319 TYPE 2 DIABETES MELLITUS WITH RETINOPATHY, WITHOUT LONG-TERM CURRENT USE OF INSULIN, MACULAR EDEMA PRESENCE UNSPECIFIED, UNSPECIFIED LATERALITY, UNSPECIFIED RETINOPATHY SEVERITY (H): ICD-10-CM

## 2022-11-13 NOTE — TELEPHONE ENCOUNTER
"Routing refill request to provider for review/approval because:  Labs out of range:  cr    Last Written Prescription Date:  8/24/21  Last Fill Quantity: 180,  # refills: 3   Last office visit provider:  8/30/22     Requested Prescriptions   Pending Prescriptions Disp Refills     glipiZIDE (GLUCOTROL XL) 10 MG 24 hr tablet [Pharmacy Med Name: GLIPIZIDE ER 10 MG TABLET] 180 tablet 3     Sig: TAKE 1 TABLET BY MOUTH TWICE A DAY       Sulfonylurea Agents Failed - 11/12/2022 10:52 AM        Failed - Patient has a recent creatinine (normal) within the past 12 mos.     Recent Labs   Lab Test 08/30/22  0930   CR 1.32*       Ok to refill medication if creatinine is low          Passed - Patient has documented A1c within the specified period of time.     If HgbA1C is 8 or greater, it needs to be on file within the past 3 months.  If less than 8, must be on file within the past 6 months.     Recent Labs   Lab Test 08/30/22  0930   A1C 6.9*             Passed - Medication is active on med list        Passed - Patient is age 18 or older        Passed - Recent (6 mo) or future (30 days) visit within the authorizing provider's specialty     Patient had office visit in the last 6 months or has a visit in the next 30 days with authorizing provider or within the authorizing provider's specialty.  See \"Patient Info\" tab in inbasket, or \"Choose Columns\" in Meds & Orders section of the refill encounter.                 Ann Edwards RN 11/13/22 4:29 PM  "

## 2022-11-14 RX ORDER — GLIPIZIDE 10 MG/1
TABLET, FILM COATED, EXTENDED RELEASE ORAL
Qty: 180 TABLET | Refills: 3 | Status: SHIPPED | OUTPATIENT
Start: 2022-11-14 | End: 2023-11-13

## 2022-12-30 ENCOUNTER — OFFICE VISIT (OUTPATIENT)
Dept: FAMILY MEDICINE | Facility: CLINIC | Age: 67
End: 2022-12-30
Payer: COMMERCIAL

## 2022-12-30 VITALS
HEART RATE: 91 BPM | DIASTOLIC BLOOD PRESSURE: 60 MMHG | BODY MASS INDEX: 31.39 KG/M2 | RESPIRATION RATE: 19 BRPM | HEIGHT: 67 IN | WEIGHT: 200 LBS | OXYGEN SATURATION: 93 % | SYSTOLIC BLOOD PRESSURE: 100 MMHG

## 2022-12-30 DIAGNOSIS — E66.09 CLASS 1 OBESITY DUE TO EXCESS CALORIES WITH SERIOUS COMORBIDITY AND BODY MASS INDEX (BMI) OF 31.0 TO 31.9 IN ADULT: ICD-10-CM

## 2022-12-30 DIAGNOSIS — I10 ESSENTIAL HYPERTENSION: ICD-10-CM

## 2022-12-30 DIAGNOSIS — E78.2 MIXED HYPERLIPIDEMIA: ICD-10-CM

## 2022-12-30 DIAGNOSIS — D12.6 TUBULAR ADENOMA OF COLON: ICD-10-CM

## 2022-12-30 DIAGNOSIS — N18.31 STAGE 3A CHRONIC KIDNEY DISEASE (H): ICD-10-CM

## 2022-12-30 DIAGNOSIS — Z00.00 ENCOUNTER FOR MEDICARE ANNUAL WELLNESS EXAM: Primary | ICD-10-CM

## 2022-12-30 DIAGNOSIS — R97.20 ELEVATED PROSTATE SPECIFIC ANTIGEN (PSA): ICD-10-CM

## 2022-12-30 DIAGNOSIS — E66.811 CLASS 1 OBESITY DUE TO EXCESS CALORIES WITH SERIOUS COMORBIDITY AND BODY MASS INDEX (BMI) OF 31.0 TO 31.9 IN ADULT: ICD-10-CM

## 2022-12-30 DIAGNOSIS — Z23 HIGH PRIORITY FOR 2019-NCOV VACCINE: ICD-10-CM

## 2022-12-30 DIAGNOSIS — E11.319 TYPE 2 DIABETES MELLITUS WITH RETINOPATHY, WITHOUT LONG-TERM CURRENT USE OF INSULIN, MACULAR EDEMA PRESENCE UNSPECIFIED, UNSPECIFIED LATERALITY, UNSPECIFIED RETINOPATHY SEVERITY (H): ICD-10-CM

## 2022-12-30 LAB
ALBUMIN SERPL BCG-MCNC: 4.4 G/DL (ref 3.5–5.2)
ALP SERPL-CCNC: 74 U/L (ref 40–129)
ALT SERPL W P-5'-P-CCNC: 33 U/L (ref 10–50)
ANION GAP SERPL CALCULATED.3IONS-SCNC: 17 MMOL/L (ref 7–15)
AST SERPL W P-5'-P-CCNC: 19 U/L (ref 10–50)
BILIRUB SERPL-MCNC: 1.2 MG/DL
BUN SERPL-MCNC: 25.7 MG/DL (ref 8–23)
CALCIUM SERPL-MCNC: 10.1 MG/DL (ref 8.8–10.2)
CHLORIDE SERPL-SCNC: 102 MMOL/L (ref 98–107)
CHOLEST SERPL-MCNC: 117 MG/DL
CREAT SERPL-MCNC: 1.38 MG/DL (ref 0.67–1.17)
DEPRECATED HCO3 PLAS-SCNC: 23 MMOL/L (ref 22–29)
GFR SERPL CREATININE-BSD FRML MDRD: 56 ML/MIN/1.73M2
GLUCOSE SERPL-MCNC: 184 MG/DL (ref 70–99)
HBA1C MFR BLD: 7 % (ref 0–5.6)
HDLC SERPL-MCNC: 38 MG/DL
HGB BLD-MCNC: 16.1 G/DL (ref 13.3–17.7)
HOLD SPECIMEN: NORMAL
LDLC SERPL CALC-MCNC: 53 MG/DL
NONHDLC SERPL-MCNC: 79 MG/DL
POTASSIUM SERPL-SCNC: 4.9 MMOL/L (ref 3.4–5.3)
PROT SERPL-MCNC: 6.8 G/DL (ref 6.4–8.3)
PSA SERPL-MCNC: 4.59 NG/ML (ref 0–4.5)
SODIUM SERPL-SCNC: 142 MMOL/L (ref 136–145)
TRIGL SERPL-MCNC: 131 MG/DL

## 2022-12-30 PROCEDURE — G0439 PPPS, SUBSEQ VISIT: HCPCS | Performed by: FAMILY MEDICINE

## 2022-12-30 PROCEDURE — 84153 ASSAY OF PSA TOTAL: CPT | Performed by: FAMILY MEDICINE

## 2022-12-30 PROCEDURE — 83036 HEMOGLOBIN GLYCOSYLATED A1C: CPT | Performed by: FAMILY MEDICINE

## 2022-12-30 PROCEDURE — 80053 COMPREHEN METABOLIC PANEL: CPT | Performed by: FAMILY MEDICINE

## 2022-12-30 PROCEDURE — 80061 LIPID PANEL: CPT | Performed by: FAMILY MEDICINE

## 2022-12-30 PROCEDURE — 85018 HEMOGLOBIN: CPT | Performed by: FAMILY MEDICINE

## 2022-12-30 PROCEDURE — 0124A COVID-19 VACCINE BIVALENT BOOSTER 12+ (PFIZER): CPT | Performed by: FAMILY MEDICINE

## 2022-12-30 PROCEDURE — 99214 OFFICE O/P EST MOD 30 MIN: CPT | Mod: 25 | Performed by: FAMILY MEDICINE

## 2022-12-30 PROCEDURE — 91312 COVID-19 VACCINE BIVALENT BOOSTER 12+ (PFIZER): CPT | Performed by: FAMILY MEDICINE

## 2022-12-30 PROCEDURE — 36415 COLL VENOUS BLD VENIPUNCTURE: CPT | Performed by: FAMILY MEDICINE

## 2022-12-30 ASSESSMENT — ENCOUNTER SYMPTOMS
EYE PAIN: 0
DIZZINESS: 0
NERVOUS/ANXIOUS: 0
JOINT SWELLING: 0
DIARRHEA: 0
HEADACHES: 0
ABDOMINAL PAIN: 0
CHILLS: 0
COUGH: 0
FEVER: 0
SHORTNESS OF BREATH: 0
HEMATURIA: 0
PARESTHESIAS: 0
CONSTIPATION: 0
MYALGIAS: 0
HEARTBURN: 0
DYSURIA: 0
PALPITATIONS: 0
ARTHRALGIAS: 0
WEAKNESS: 0
SORE THROAT: 0
NAUSEA: 0
FREQUENCY: 0
HEMATOCHEZIA: 0

## 2022-12-30 ASSESSMENT — ACTIVITIES OF DAILY LIVING (ADL): CURRENT_FUNCTION: NO ASSISTANCE NEEDED

## 2022-12-30 NOTE — PATIENT INSTRUCTIONS
Patient Education   Personalized Prevention Plan  You are due for the preventive services outlined below.  Your care team is available to assist you in scheduling these services.  If you have already completed any of these items, please share that information with your care team to update in your medical record.  Health Maintenance Due   Topic Date Due     LUNG CANCER SCREENING  Never done     COVID-19 Vaccine (5 - Booster for Pfizer series) 06/21/2022     Flu Vaccine (1) Never done     Cholesterol Lab  12/23/2022     Diabetic Foot Exam  12/23/2022     ANNUAL REVIEW OF HM ORDERS  12/23/2022     Hemoglobin  12/23/2022       Exercise for a Healthier Heart  You may wonder how you can improve the health of your heart. If you re thinking about exercise, you re on the right track. You don t need to become an athlete. But you do need a certain amount of brisk exercise to help strengthen your heart. If you have been diagnosed with a heart condition, your healthcare provider may advise exercise to help stabilize your condition. To help make exercise a habit, choose safe, fun activities.      Exercise with a friend. When activity is fun, you're more likely to stick with it.   Before you start  Check with your healthcare provider before starting an exercise program. This is especially important if you have not been active for a while. It's also important if you have a long-term (chronic) health problem such as heart disease, diabetes, or obesity. Or if you are at high risk for having these problems.   Why exercise?  Exercising regularly offers many healthy rewards. It can help you do all of the following:     Improve your blood cholesterol level to help prevent further heart trouble    Lower your blood pressure to help prevent a stroke or heart attack    Control diabetes, or reduce your risk of getting this disease    Improve your heart and lung function    Reach and stay at a healthy weight    Make your muscles stronger so  you can stay active    Prevent falls and fractures by slowing the loss of bone mass (osteoporosis)    Manage stress better    Reduce your blood pressure    Improve your sense of self and your body image  Exercise tips      Ease into your routine. Set small goals. Then build on them. If you are not sure what your activity level should be, talk with your healthcare provider first before starting an exercise routine.    Exercise on most days. Aim for a total of 150 minutes (2 hours and 30 minutes) or more of moderate-intensity aerobic activity each week. Or 75 minutes (1 hour and 15 minutes) or more of vigorous-intensity aerobic activity each week. Or try for a combination of both. Moderate activity means that you breathe heavier and your heart rate increases but you can still talk. Think about doing 40 minutes of moderate exercise, 3 to 4 times a week. For best results, activity should last for about 40 minutes to lower blood pressure and cholesterol. It's OK to work up to the 40-minute period over time. Examples of moderate-intensity activity are walking 1 mile in 15 minutes. Or doing 30 to 45 minutes of yard work.    Step up your daily activity level.  Along with your exercise program, try being more active the whole day. Walk instead of drive. Or park further away so that you take more steps each day. Do more household tasks or yard work. You may not be able to meet the advised mount of physical activity. But doing some moderate- or vigorous-intensity aerobic activity can help reduce your risk for heart disease. Your healthcare provider can help you figure out what is best for you.    Choose 1 or more activities you enjoy.  Walking is one of the easiest things you can do. You can also try swimming, riding a bike, dancing, or taking an exercise class.    When to call your healthcare provider  Call your healthcare provider if you have any of these:     Chest pain or feel dizzy or lightheaded    Burning, tightness,  pressure, or heaviness in your chest, neck, shoulders, back, or arms    Abnormal shortness of breath    More joint or muscle pain    A very fast or irregular heartbeat (palpitations)  [employer] last reviewed this educational content on 7/1/2019 2000-2021 [employer]. All rights reserved. This information is not intended as a substitute for professional medical care. Always follow your healthcare professional's instructions.          Signs of Hearing Loss      Hearing much better with one ear can be a sign of hearing loss.   Hearing loss is a problem shared by many people. In fact, it is one of the most common health problems, particularly as people age. Most people age 65 and older have some hearing loss. By age 80, almost everyone does. Hearing loss often occurs slowly over the years. So you may not realize your hearing has gotten worse.  Have your hearing checked  Call your healthcare provider if you:    Have to strain to hear normal conversation    Have to watch other people s faces very carefully to follow what they re saying    Need to ask people to repeat what they ve said    Often misunderstand what people are saying    Turn the volume of the television or radio up so high that others complain    Feel that people are mumbling when they re talking to you    Find that the effort to hear leaves you feeling tired and irritated    Notice, when using the phone, that you hear better with one ear than the other  [employer] last reviewed this educational content on 1/1/2020 2000-2021 [employer]. All rights reserved. This information is not intended as a substitute for professional medical care. Always follow your healthcare professional's instructions.

## 2022-12-30 NOTE — PROGRESS NOTES
"SUBJECTIVE:     Clif is a 67 year old who presents for Preventive Visit.    Patient has been advised of split billing requirements and indicates understanding: Yes  Are you in the first 12 months of your Medicare coverage?  No      Annual wellness visit completed.  Risk questionnaire associate with lack of consistent exercise as well as suboptimal hearing.  Wants to ensure no cerumen impaction etc.  Does have type 2 diabetes historically continuing metformin 1000 mg twice daily and glipizide XL 10 mg twice daily.  Prior A1c had improved from 7.4% down to 6.9% August 30, 2022.  Some dietary indiscretions.  1 pound weight gain since prior office visit.  Underlying hypertension treated with lisinopril hydrochlorothiazide 20/12.5 daily.  Continues use of statin therapy for lipid management with atorvastatin 40 mg daily.  Known history of diabetic retinopathy and follows with Dr. Elizalde ophthalmologist with [facility] and has scheduled follow-up July 10, 2023.  Needs to bivalent COVID Pfizer booster.  Declines flu shot.  CKD stage IIIa historically with prior creatinine 1.43 and GFR 54.  Mildly elevated PSA previously at 4.9 with normal values less than 4.5 identified.  Comprehensive review of systems as above otherwise all negative.      S.O. 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  Good    Frequency of exercise:  1 day/week    Duration of exercise:  N/A    Do you usually eat at least 4 servings of fruit and vegetables a day, include whole grains    & fiber and avoid regularly eating high fat or \"junk\" foods?  Yes    Taking " medications regularly:  Yes    Medication side effects:  Not applicable    Ability to successfully perform activities of daily living:  No assistance needed    Home Safety:  No safety concerns identified    Hearing Impairment:  Feel that people are mumbling or not speaking clearly, need to ask people to speak up or repeat themselves and difficulty understanding soft or whispered speech    In the past 6 months, have you been bothered by leaking of urine?  No    In general, how would you rate your overall mental or emotional health?  Good      PHQ-2 Total Score: 0    Additional concerns today:  No      Have you ever done Advance Care Planning? (For example, a Health Directive, POLST, or a discussion with a medical provider or your loved ones about your wishes): No, advance care planning information given to patient to review.  Advanced care planning was discussed at today's visit.       Fall risk  Fallen 2 or more times in the past year?: No  Any fall with injury in the past year?: No    Cognitive Screening   1) Repeat 3 items (Leader, Season, Table)    2) Clock draw: NORMAL  3) 3 item recall: Recalls 3 objects  Results: 3 items recalled: COGNITIVE IMPAIRMENT LESS LIKELY    Mini-CogTM Copyright S Yousuf. Licensed by the author for use in [facility]; reprinted with permission (leila@[email or web address]). All rights reserved.      Do you have sleep apnea, excessive snoring or daytime drowsiness?: no    Reviewed and updated as needed this visit by clinical staff     Meds              Reviewed and updated as needed this visit by Provider                 Social History     Tobacco Use     Smoking status: Light Smoker     Types: Cigarettes     Smokeless tobacco: Never   Substance Use Topics     Alcohol use: Yes     Comment: Alcoholic Drinks/day: rare     If you drink alcohol do you typically have >3 drinks per day or >7 drinks per week? No    Alcohol Use 12/30/2022   Prescreen: >3 drinks/day or >7 drinks/week? Not  Applicable   Prescreen: >3 drinks/day or >7 drinks/week? -               Current providers sharing in care for this patient include:   Patient Care Team:  Mark Lin MD as PCP - General  Mark Lin MD as Assigned PCP    The following health maintenance items are reviewed in Epic and correct as of today:  Health Maintenance   Topic Date Due     LUNG CANCER SCREENING  Never done     INFLUENZA VACCINE (1) Never done     LIPID  12/23/2022     DIABETIC FOOT EXAM  12/23/2022     HEMOGLOBIN  12/23/2022     MICROALBUMIN  04/26/2023     EYE EXAM  06/20/2023     A1C  06/30/2023     BMP  08/30/2023     NICOTINE/TOBACCO CESSATION COUNSELING Q 1 YR  12/30/2023     MEDICARE ANNUAL WELLNESS VISIT  12/30/2023     ANNUAL REVIEW OF HM ORDERS  12/30/2023     FALL RISK ASSESSMENT  12/30/2023     COLORECTAL CANCER SCREENING  09/26/2024     DTAP/TDAP/TD IMMUNIZATION (4 - Td or Tdap) 07/05/2027     ADVANCE CARE PLANNING  12/30/2027     HEPATITIS C SCREENING  Completed     PHQ-2 (once per calendar year)  Completed     Pneumococcal Vaccine: 65+ Years  Completed     URINALYSIS  Completed     ZOSTER IMMUNIZATION  Completed     AORTIC ANEURYSM SCREENING (SYSTEM ASSIGNED)  Completed     COVID-19 Vaccine  Completed     IPV IMMUNIZATION  Aged Out     MENINGITIS IMMUNIZATION  Aged Out     Lab work is in process  Labs reviewed in EPIC  BP Readings from Last 3 Encounters:   12/30/22 100/60   08/30/22 90/60   04/26/22 110/68    Wt Readings from Last 3 Encounters:   12/30/22 90.7 kg (200 lb)   08/30/22 90.3 kg (199 lb)   04/26/22 92.5 kg (204 lb)                  Patient Active Problem List   Diagnosis     Dupuytren's Contracture     Type 2 diabetes mellitus with retinopathy, without long-term current use of insulin, macular edema presence unspecified, unspecified laterality, unspecified retinopathy severity (H)     Hyperlipidemia     Obesity     Essential Hypertension     Tubular adenoma of colon     Cataracts, bilateral     Vitreous  floaters of both eyes     Vitamin D deficiency     Asymmetrical sensorineural hearing loss     Stage 3a chronic kidney disease (H)     Hyperbilirubinemia     No past surgical history on file.    Social History     Tobacco Use     Smoking status: Light Smoker     Types: Cigarettes     Smokeless tobacco: Never   Substance Use Topics     Alcohol use: Yes     Comment: Alcoholic Drinks/day: rare     Family History   Problem Relation Age of Onset     Diabetes Mother      Diabetes Father      Diabetes Brother          Current Outpatient Medications   Medication Sig Dispense Refill     aspirin 81 MG EC tablet [ASPIRIN 81 MG EC TABLET] Take 81 mg by mouth daily.       atorvastatin (LIPITOR) 40 MG tablet Take 1 tablet (40 mg) by mouth daily 90 tablet 3     blood glucose (NO BRAND SPECIFIED) test strip Use to test blood sugar 1 times daily or as directed. 100 strip 3     blood glucose monitoring (NO BRAND SPECIFIED) meter device kit Use to test blood sugar 1 times daily or as directed. 1 kit 0     blood glucose test (ACCU-CHEK GUIDE TEST STRIPS) strips [BLOOD GLUCOSE TEST (ACCU-CHEK GUIDE TEST STRIPS) STRIPS] Use 1 each As Directed daily. Dispense brand per patient's insurance at pharmacy discretion. 50 strip 3     cholecalciferol, vitamin D3, (VITAMIN D3) 2,000 unit capsule [CHOLECALCIFEROL, VITAMIN D3, (VITAMIN D3) 2,000 UNIT CAPSULE] Take 1,000 Units by mouth daily.       glipiZIDE (GLUCOTROL XL) 10 MG 24 hr tablet TAKE 1 TABLET BY MOUTH TWICE A  tablet 3     lisinopril-hydrochlorothiazide (ZESTORETIC) 20-12.5 MG tablet [LISINOPRIL-HYDROCHLOROTHIAZIDE (PRINZIDE,ZESTORETIC) 20-12.5 MG PER TABLET] TAKE 1 TABLET BY MOUTH EVERY DAY 90 tablet 3     metFORMIN (GLUCOPHAGE) 1000 MG tablet TAKE 1 TABLET BY MOUTH TWICE A DAY WITH MEALS 180 tablet 3     No Known Allergies  Recent Labs   Lab Test 12/30/22  0937 08/30/22  0930 04/26/22  0933 12/23/21  1042 08/24/21  0840 04/22/21  1000 04/22/21  0922 12/22/20  0900 12/22/20  0853  "08/21/20  0915   A1C 7.0* 6.9* 7.4*  --    < >  --    < >  --    < >  --    LDL  --   --   --  53  --  56  --   --   --  62   HDL  --   --   --  37*  --  33*  --   --   --  35*   TRIG  --   --   --  145  --  160*  --   --   --  140   ALT  --   --  38 32  --  36  --   --   --   --    CR  --  1.32* 1.43* 1.25   < > 1.40*  --  1.30  --  1.24   GFRESTIMATED  --  59* 54* 63   < > 51*  --  55*  --  59*   GFRESTBLACK  --   --   --   --   --  >60  --  >60  --  >60   POTASSIUM  --  4.0 4.5 4.1   < > 4.5  --  4.1  --  4.4    < > = values in this interval not displayed.      Needs bivalent Pfizer COVID-19 booster.  Declines flu shot.  Immunizations otherwise UTD        Review of Systems   Constitutional: Negative for chills and fever.   HENT: Positive for hearing loss. Negative for congestion, ear pain and sore throat.    Eyes: Negative for pain and visual disturbance.   Respiratory: Negative for cough and shortness of breath.    Cardiovascular: Negative for chest pain, palpitations and peripheral edema.   Gastrointestinal: Negative for abdominal pain, constipation, diarrhea, heartburn, hematochezia and nausea.   Genitourinary: Positive for impotence. Negative for dysuria, frequency, genital sores, hematuria, penile discharge and urgency.   Musculoskeletal: Negative for arthralgias, joint swelling and myalgias.   Skin: Negative for rash.   Neurological: Negative for dizziness, weakness, headaches and paresthesias.   Psychiatric/Behavioral: Negative for mood changes. The patient is not nervous/anxious.      Constitutional, HEENT, cardiovascular, pulmonary, GI, , musculoskeletal, neuro, skin, endocrine and psych systems are negative, except as otherwise noted.    OBJECTIVE:   /60   Pulse 91   Resp 19   Ht 1.708 m (5' 7.25\")   Wt 90.7 kg (200 lb)   SpO2 93%   BMI 31.09 kg/m   Estimated body mass index is 31.09 kg/m  as calculated from the following:    Height as of this encounter: 1.708 m (5' 7.25\").    Weight as of " this encounter: 90.7 kg (200 lb).  Physical Exam  GENERAL: healthy, alert and no distress  EYES: Eyes grossly normal to inspection, PERRL and conjunctivae and sclerae normal  HENT: ear canals and TM's normal, nose and mouth without ulcers or lesions  NECK: no adenopathy, no asymmetry, masses, or scars and thyroid normal to palpation  RESP: lungs clear to auscultation - no rales, rhonchi or wheezes  CV: regular rate and rhythm, normal S1 S2, no S3 or S4, no murmur, click or rub, no peripheral edema and peripheral pulses strong  ABDOMEN: soft, nontender, no hepatosplenomegaly, no masses and bowel sounds normal   (male): normal male genitalia without lesions or urethral discharge, no hernia  RECTAL: normal sphincter tone, no rectal masses, prostate normal size, smooth, nontender without nodules or masses  MS: no gross musculoskeletal defects noted, no edema  SKIN: no suspicious lesions or rashes  NEURO: Normal strength and tone, mentation intact and speech normal  PSYCH: mentation appears normal, affect normal/bright    Diagnostic Test Results:  Labs reviewed in Epic  Results for orders placed or performed in visit on 12/30/22 (from the past 24 hour(s))   Hemoglobin A1c   Result Value Ref Range    Hemoglobin A1C 7.0 (H) 0.0 - 5.6 %   Extra Tube    Narrative    The following orders were created for panel order Extra Tube.  Procedure                               Abnormality         Status                     ---------                               -----------         ------                     Extra Red Top Tube[[id number]]                               In process                   Please view results for these tests on the individual orders.       ASSESSMENT / PLAN:     Encounter for Medicare annual wellness exam  Annual wellness visit completed.  Risk questionnaire reviewed.  Suboptimal hearing.  Lack of consistent exercise.  Annual wellness visit to continue.    Type 2 diabetes mellitus with retinopathy, without  long-term current use of insulin, macular edema presence unspecified, unspecified laterality, unspecified retinopathy severity (H)  A1c relatively stable at 7%.  We will continue metformin 1000 mg twice daily and glipizide XL 10 mg twice daily with anticipated weight goal less than 195 pounds initially, less than 190 pounds ideally with diabetes recheck at follow-up in 4 months.  History of diabetic retinopathy and continues to follow with [facility] with scheduled follow-up July 10, 2023.  Monofilament testing today was normal.  Prior microalbumin screen was normal April 26, 2022 and will repeat at follow-up visit.  - Hemoglobin A1c  - Comprehensive metabolic panel    Essential hypertension  Continuing lisinopril hydrochlorothiazide 20/12.5 using 1 tablet daily.  Hypertension well controlled currently.  Med monitoring completed.  - Comprehensive metabolic panel    Class 1 obesity due to excess calories with serious comorbidity and body mass index (BMI) of 31.0 to 31.9 in adult  Dietary and exercise modifications for weight goal less than 195 pounds initially, less than 190 pounds ideally.    Mixed hyperlipidemia  Remains on high intensity statin therapy with atorvastatin 40 mg daily.  - Lipid panel reflex to direct LDL Fasting    Elevated prostate specific antigen (PSA)  Diagnostic PSA with history of mild PSA elevation historically otherwise digital rectal exam today was normal.  - PSA tumor marker    Stage 3a chronic kidney disease (H)  CKD stage IIIa with prior creatinine 1.43 and GFR 54 ensure stable renal function.  Ensure adequate hydration.  - Hemoglobin  - Comprehensive metabolic panel    Tubular adenoma of colon  Tubular adenoma of colon on prior colonoscopy September 26, 2019 we will continue routine follow-up screening per recommendation    High priority for 2019-nCoV vaccine  COVID-19 bivalent Pfizer booster provided today.  - COVID-19,PF,PFIZER BOOSTER BIVALENT 12+Yrs        Patient has been  advised of split billing requirements and indicates understanding: No      COUNSELING:  Reviewed preventive health counseling, as reflected in patient instructions       Regular exercise       Healthy diet/nutrition       Vision screening       Hearing screening       Dental care       Bladder control       Fall risk prevention       Aspirin prophylaxis        Colon cancer screening       Prostate cancer screening        He reports that he has been smoking cigarettes. He has never used smokeless tobacco.  Nicotine/Tobacco Cessation Plan:   Information offered: Patient not interested at this time      Appropriate preventive services were discussed with this patient, including applicable screening as appropriate for cardiovascular disease, diabetes, osteopenia/osteoporosis, and glaucoma.  As appropriate for age/gender, discussed screening for colorectal cancer, prostate cancer, breast cancer, and cervical cancer. Checklist reviewing preventive services available has been given to the patient.    Reviewed patients plan of care and provided an AVS. The Intermediate Care Plan ( asthma action plan, low back pain action plan, and migraine action plan) for Clif meets the Care Plan requirement. This Care Plan has been established and reviewed with the Patient.          Mark Lin MD  [facility]    Identified Health Risks:

## 2022-12-30 NOTE — LETTER
January 1, 2023      Clif Christie  [address] [place] [address]        Dear ,    We are writing to inform you of your test results.    Continue your current diabetes management as discussed in order to further improve.  Goal A1c remains < 7.0% ideally.     Your prostate cancer screening test (i.e.PSA) has improved.  It remains mildly elevated only.  We will plan to repeat at your follow-up exam in the spring.    Your cholesterol results are near goal.  Continue your current medication as discussed.  Ensure ongoing efforts to achieve weight goal < 195 pounds initially, < 190 pounds ideally.     Your kidney function remains mildly reduced.  It appears stable.  We will continue to follow closely.     Normal hemoglobin level.  No evidence for anemia.      Resulted Orders   Hemoglobin A1c   Result Value Ref Range    Hemoglobin A1C 7.0 (H) 0.0 - 5.6 %      Comment:      Normal <5.7%   Prediabetes 5.7-6.4%    Diabetes 6.5% or higher     Note: Adopted from ADA consensus guidelines.   PSA tumor marker   Result Value Ref Range    PSA Tumor Marker 4.59 (H) 0.00 - 4.50 ng/mL    Narrative    This result is obtained using the Roche Elecsys total PSA method on the yareli e801 immunoassay analyzer. Results obtained with different assay methods or kits cannot be used interchangeably.   Lipid panel reflex to direct LDL Fasting   Result Value Ref Range    Cholesterol 117 <200 mg/dL    Triglycerides 131 <150 mg/dL    Direct Measure HDL 38 (L) >=40 mg/dL    LDL Cholesterol Calculated 53 <=100 mg/dL    Non HDL Cholesterol 79 <130 mg/dL    Narrative    Cholesterol  Desirable:  <200 mg/dL    Triglycerides  Normal:  Less than 150 mg/dL  Borderline High:  150-199 mg/dL  High:  200-499 mg/dL  Very High:  Greater than or equal to 500 mg/dL    Direct Measure HDL  Female:  Greater than or equal to 50 mg/dL   Male:  Greater than or equal to 40 mg/dL    LDL Cholesterol  Desirable:  <100mg/dL  Above Desirable:   100-129 mg/dL   Borderline High:  130-159 mg/dL   High:  160-189 mg/dL   Very High:  >= 190 mg/dL    Non HDL Cholesterol  Desirable:  130 mg/dL  Above Desirable:  130-159 mg/dL  Borderline High:  160-189 mg/dL  High:  190-219 mg/dL  Very High:  Greater than or equal to 220 mg/dL   Comprehensive metabolic panel   Result Value Ref Range    Sodium 142 136 - 145 mmol/L    Potassium 4.9 3.4 - 5.3 mmol/L    Chloride 102 98 - 107 mmol/L    Carbon Dioxide (CO2) 23 22 - 29 mmol/L    Anion Gap 17 (H) 7 - 15 mmol/L    Urea Nitrogen 25.7 (H) 8.0 - 23.0 mg/dL    Creatinine 1.38 (H) 0.67 - 1.17 mg/dL    Calcium 10.1 8.8 - 10.2 mg/dL    Glucose 184 (H) 70 - 99 mg/dL    Alkaline Phosphatase 74 40 - 129 U/L    AST 19 10 - 50 U/L    ALT 33 10 - 50 U/L    Protein Total 6.8 6.4 - 8.3 g/dL    Albumin 4.4 3.5 - 5.2 g/dL    Bilirubin Total 1.2 <=1.2 mg/dL    GFR Estimate 56 (L) >60 mL/min/1.73m2      Comment:      Effective December 21, 2021 eGFRcr in adults is calculated using the 2021 CKD-EPI creatinine equation which includes age and gender (Lorenzo et al., NEJM, DOI: 10.1056/FLFOnr5338054)   Hemoglobin   Result Value Ref Range    Hemoglobin 16.1 13.3 - 17.7 g/dL       If you have any questions or concerns, please call the clinic at the number listed above.       Sincerely,      Mark Lin MD

## 2023-01-13 DIAGNOSIS — E78.2 MIXED HYPERLIPIDEMIA: ICD-10-CM

## 2023-01-13 DIAGNOSIS — I10 ESSENTIAL HYPERTENSION: ICD-10-CM

## 2023-01-15 RX ORDER — ATORVASTATIN CALCIUM 40 MG/1
TABLET, FILM COATED ORAL
Qty: 90 TABLET | Refills: 3 | Status: SHIPPED | OUTPATIENT
Start: 2023-01-15 | End: 2024-01-15

## 2023-01-15 NOTE — TELEPHONE ENCOUNTER
"Routing refill request to provider for review/approval because:  Labs out of range: Serum creatinine     Last Written Prescription Date:  1/14/2022  Last Fill Quantity: 90,  # refills: 3   Last office visit provider: 12/30/2022 with PCP Dr RAJAT Lin      Requested Prescriptions   Pending Prescriptions Disp Refills     lisinopril-hydrochlorothiazide (ZESTORETIC) 20-12.5 MG tablet [Pharmacy Med Name: LISINOPRIL-HCTZ 20-12.5 MG TAB] 90 tablet 3     Sig: TAKE 1 TABLET BY MOUTH EVERY DAY       Diuretics (Including Combos) Protocol Failed - 1/13/2023 12:25 AM        Failed - Normal serum creatinine on file in past 12 months     Recent Labs   Lab Test 12/30/22  1103   CR 1.38*              Passed - Blood pressure under 140/90 in past 12 months     BP Readings from Last 3 Encounters:   12/30/22 100/60   08/30/22 90/60   04/26/22 110/68                 Passed - Recent (12 mo) or future (30 days) visit within the authorizing provider's specialty     Patient has had an office visit with the authorizing provider or a provider within the authorizing providers department within the previous 12 mos or has a future within next 30 days. See \"Patient Info\" tab in inbasket, or \"Choose Columns\" in Meds & Orders section of the refill encounter.              Passed - Medication is active on med list        Passed - Patient is age 18 or older        Passed - Normal serum potassium on file in past 12 months     Recent Labs   Lab Test 12/30/22  1103   POTASSIUM 4.9                    Passed - Normal serum sodium on file in past 12 months     Recent Labs   Lab Test 12/30/22  1103                ACE Inhibitors (Including Combos) Protocol Failed - 1/13/2023 12:25 AM        Failed - Normal serum creatinine on file in past 12 months     Recent Labs   Lab Test 12/30/22  1103   CR 1.38*       Ok to refill medication if creatinine is low          Passed - Blood pressure under 140/90 in past 12 months     BP Readings from Last 3 Encounters: " "  12/30/22 100/60   08/30/22 90/60   04/26/22 110/68                 Passed - Recent (12 mo) or future (30 days) visit within the authorizing provider's specialty     Patient has had an office visit with the authorizing provider or a provider within the authorizing providers department within the previous 12 mos or has a future within next 30 days. See \"Patient Info\" tab in inbasket, or \"Choose Columns\" in Meds & Orders section of the refill encounter.              Passed - Medication is active on med list        Passed - Patient is age 18 or older        Passed - Normal serum potassium on file in past 12 months     Recent Labs   Lab Test 12/30/22  1103   POTASSIUM 4.9                  Lolly Herrera RN 01/15/23 5:44 PM  "

## 2023-01-15 NOTE — TELEPHONE ENCOUNTER
"Last Written Prescription Date:  1/20/2022  Last Fill Quantity: 90,  # refills: 3   Last office visit provider: 12/30/2022 with PCP Dr RAJAT Lin      Requested Prescriptions   Pending Prescriptions Disp Refills     atorvastatin (LIPITOR) 40 MG tablet [Pharmacy Med Name: ATORVASTATIN 40 MG TABLET] 90 tablet 3     Sig: TAKE 1 TABLET BY MOUTH EVERY DAY       Statins Protocol Passed - 1/13/2023 12:25 AM        Passed - LDL on file in past 12 months     Recent Labs   Lab Test 12/30/22  1103   LDL 53             Passed - No abnormal creatine kinase in past 12 months     No lab results found.             Passed - Recent (12 mo) or future (30 days) visit within the authorizing provider's specialty     Patient has had an office visit with the authorizing provider or a provider within the authorizing providers department within the previous 12 mos or has a future within next 30 days. See \"Patient Info\" tab in inbasket, or \"Choose Columns\" in Meds & Orders section of the refill encounter.              Passed - Medication is active on med list        Passed - Patient is age 18 or older             Lolly Herrera RN 01/15/23 5:47 PM  "

## 2023-01-16 RX ORDER — LISINOPRIL AND HYDROCHLOROTHIAZIDE 12.5; 2 MG/1; MG/1
TABLET ORAL
Qty: 90 TABLET | Refills: 3 | Status: SHIPPED | OUTPATIENT
Start: 2023-01-16 | End: 2024-01-15

## 2023-05-04 ENCOUNTER — OFFICE VISIT (OUTPATIENT)
Dept: FAMILY MEDICINE | Facility: CLINIC | Age: 68
End: 2023-05-04
Payer: COMMERCIAL

## 2023-05-04 VITALS
OXYGEN SATURATION: 96 % | WEIGHT: 204 LBS | RESPIRATION RATE: 18 BRPM | SYSTOLIC BLOOD PRESSURE: 110 MMHG | HEART RATE: 75 BPM | BODY MASS INDEX: 30.92 KG/M2 | DIASTOLIC BLOOD PRESSURE: 60 MMHG | HEIGHT: 68 IN | TEMPERATURE: 97.2 F

## 2023-05-04 DIAGNOSIS — N18.31 STAGE 3A CHRONIC KIDNEY DISEASE (H): ICD-10-CM

## 2023-05-04 DIAGNOSIS — R97.20 ELEVATED PROSTATE SPECIFIC ANTIGEN (PSA): ICD-10-CM

## 2023-05-04 DIAGNOSIS — E11.319 TYPE 2 DIABETES MELLITUS WITH RETINOPATHY, WITHOUT LONG-TERM CURRENT USE OF INSULIN, MACULAR EDEMA PRESENCE UNSPECIFIED, UNSPECIFIED LATERALITY, UNSPECIFIED RETINOPATHY SEVERITY (H): Primary | ICD-10-CM

## 2023-05-04 DIAGNOSIS — E78.2 MIXED HYPERLIPIDEMIA: ICD-10-CM

## 2023-05-04 DIAGNOSIS — I10 ESSENTIAL HYPERTENSION: ICD-10-CM

## 2023-05-04 LAB
ANION GAP SERPL CALCULATED.3IONS-SCNC: 14 MMOL/L (ref 7–15)
BUN SERPL-MCNC: 21.1 MG/DL (ref 8–23)
CALCIUM SERPL-MCNC: 9.7 MG/DL (ref 8.8–10.2)
CHLORIDE SERPL-SCNC: 100 MMOL/L (ref 98–107)
CREAT SERPL-MCNC: 1.31 MG/DL (ref 0.67–1.17)
CREAT UR-MCNC: 282 MG/DL
DEPRECATED HCO3 PLAS-SCNC: 26 MMOL/L (ref 22–29)
GFR SERPL CREATININE-BSD FRML MDRD: 59 ML/MIN/1.73M2
GLUCOSE SERPL-MCNC: 152 MG/DL (ref 70–99)
HBA1C MFR BLD: 7 % (ref 0–5.6)
HOLD SPECIMEN: NORMAL
HOLD SPECIMEN: NORMAL
MICROALBUMIN UR-MCNC: 38.5 MG/L
MICROALBUMIN/CREAT UR: 13.65 MG/G CR (ref 0–17)
POTASSIUM SERPL-SCNC: 3.8 MMOL/L (ref 3.4–5.3)
PSA SERPL DL<=0.01 NG/ML-MCNC: 4.81 NG/ML (ref 0–4.5)
SODIUM SERPL-SCNC: 140 MMOL/L (ref 136–145)

## 2023-05-04 PROCEDURE — 82570 ASSAY OF URINE CREATININE: CPT | Performed by: FAMILY MEDICINE

## 2023-05-04 PROCEDURE — 36415 COLL VENOUS BLD VENIPUNCTURE: CPT | Performed by: FAMILY MEDICINE

## 2023-05-04 PROCEDURE — 84153 ASSAY OF PSA TOTAL: CPT | Performed by: FAMILY MEDICINE

## 2023-05-04 PROCEDURE — 83036 HEMOGLOBIN GLYCOSYLATED A1C: CPT | Performed by: FAMILY MEDICINE

## 2023-05-04 PROCEDURE — 99214 OFFICE O/P EST MOD 30 MIN: CPT | Performed by: FAMILY MEDICINE

## 2023-05-04 PROCEDURE — 80048 BASIC METABOLIC PNL TOTAL CA: CPT | Performed by: FAMILY MEDICINE

## 2023-05-04 PROCEDURE — 82043 UR ALBUMIN QUANTITATIVE: CPT | Performed by: FAMILY MEDICINE

## 2023-05-04 ASSESSMENT — PAIN SCALES - GENERAL: PAINLEVEL: NO PAIN (0)

## 2023-05-04 NOTE — LETTER
May 5, 2023      Clif Christie  [address] [place] [address]        Dear ,    We are writing to inform you of your test results.    Continue your current diabetes management as discussed in order to further improve.  Goal A1c remains < 7.0% ideally.     Your urine screen was normal.  No evidence for significant protein in your urine.  We will plan to repeat this test on a yearly basis.     Your prostate cancer screening test (i.e.PSA) remains mildly elevated.  I would like to get the opinion of a urologist in order to see if they feel further evaluation warranted.  I will place a referral to see [facility] in [place].      [facility]   [address] [address]   Phone: [phone]       Your kidney function remains mildly reduced only.  Ensure adequate hydration.  We will continue to follow closely.     Resulted Orders   Hemoglobin A1c   Result Value Ref Range    Hemoglobin A1C 7.0 (H) 0.0 - 5.6 %      Comment:      Normal <5.7%   Prediabetes 5.7-6.4%    Diabetes 6.5% or higher     Note: Adopted from ADA consensus guidelines.   Albumin Random Urine Quantitative with Creat Ratio   Result Value Ref Range    Creatinine Urine mg/dL 282.0 mg/dL      Comment:      The reference ranges have not been established in urine creatinine. The results should be integrated into the clinical context for interpretation.    Albumin Urine mg/L 38.5 mg/L      Comment:      The reference ranges have not been established in urine albumin. The results should be integrated into the clinical context for interpretation.    Albumin Urine mg/g Cr 13.65 0.00 - 17.00 mg/g Cr      Comment:      Microalbuminuria is defined as an albumin:creatinine ratio of 17 to 299 for males and 25 to 299 for females. A ratio of albumin:creatinine of 300 or higher is indicative of overt proteinuria.  Due to biologic variability, positive results should be confirmed by a second,  first-morning random or 24-hour timed urine specimen. If there is discrepancy, a third specimen is recommended. When 2 out of 3 results are in the microalbuminuria range, this is evidence for incipient nephropathy and warrants increased efforts at glucose control, blood pressure control, and institution of therapy with an angiotensin-converting-enzyme (ACE) inhibitor (if the patient can tolerate it).     PSA tumor marker   Result Value Ref Range    PSA Tumor Marker 4.81 (H) 0.00 - 4.50 ng/mL    Narrative    This result is obtained using the Roche Elecsys total PSA method on the yareli e801 immunoassay analyzer. Results obtained with different assay methods or kits cannot be used interchangeably.   Basic metabolic panel   Result Value Ref Range    Sodium 140 136 - 145 mmol/L    Potassium 3.8 3.4 - 5.3 mmol/L    Chloride 100 98 - 107 mmol/L    Carbon Dioxide (CO2) 26 22 - 29 mmol/L    Anion Gap 14 7 - 15 mmol/L    Urea Nitrogen 21.1 8.0 - 23.0 mg/dL    Creatinine 1.31 (H) 0.67 - 1.17 mg/dL    Calcium 9.7 8.8 - 10.2 mg/dL    Glucose 152 (H) 70 - 99 mg/dL    GFR Estimate 59 (L) >60 mL/min/1.73m2      Comment:      eGFR calculated using 2021 CKD-EPI equation.       If you have any questions or concerns, please call the clinic at the number listed above.       Sincerely,      Mark Lin MD

## 2023-05-04 NOTE — PROGRESS NOTES
Assessment/Plan:    Type 2 diabetes mellitus with retinopathy, without long-term current use of insulin, macular edema presence unspecified, unspecified laterality, unspecified retinopathy severity (H)  Type 2 diabetes reviewed.  A1c stable at 7% despite 4 pound weight gain since December 30, 2022 wellness visits.  Eye exam scheduled for July 2023.  Prior monofilament testing normal.  Need to update microalbumin screen today with history of CKD stage IIIa as well.  Dietary and exercise modification for weight goal less than 195 pounds initially, less than 190 pounds ideally.  - Hemoglobin A1c  - Albumin Random Urine Quantitative with Creat Ratio  - Hemoglobin A1c  - Basic metabolic panel    Essential hypertension  Hypertension reviewed.  Stable with lisinopril hydrochlorothiazide 20/12.5 daily with blood pressure on recheck 112/58.  Consider discontinuation of thiazide diuretic component if blood pressures remain low normal.  Denies any orthostatic hypotension currently.    Mixed hyperlipidemia  Continuing use of atorvastatin 40 mg daily    Stage 3a chronic kidney disease (H)  CKD stage IIIa historically with most recent creatinine 1.38 and GFR 56 we will update microalbumin screen  - Albumin Random Urine Quantitative with Creat Ratio  - Basic metabolic panel    Elevated prostate specific antigen (PSA)  Has had mild PSA elevation historically and will update diagnostic PSA today.  Has not seen urologist previously.  - PSA tumor marker      The following high BMI interventions were performed this visit: encouragement to exercise, weight monitoring, weight loss from baseline weight and lifestyle education regarding diet .  Ensure ongoing efforts to achieve weight goal < 195 pounds initially, < 190 pounds ideally.         Subjective:    Clif Christie is seen today for follow-up assessment.  Type 2 diabetes.  Metformin 1000 mg twice daily and glipizide XL 10 mg twice daily.  Lisinopril hydrochlorothiazide 20/12.5  "daily for hypertension.  Atorvastatin 40 mg daily for lipid management.  Has had mild PSA elevation historically without history of known prostate cancer.  Prior digital rectal exam normal.  CKD stage IIIa with most recent creatinine 1.38 and GFR 56.  Prior tubular adenoma colonoscopy September 2019 and will repeat at 5-year interval.  Has scheduled diabetic eye exam in July 2023.  Denies recent illness.  4 pound weight gain since December 30, 2022 annual wellness visit.  Comprehensive review of systems as above otherwise all negative.      S.O. 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     Family History   Problem Relation Age of Onset     Diabetes Mother      Diabetes Father      Diabetes Brother         Past Medical History:   Diagnosis Date     Diabetes mellitus (H)      Hypertension         Social History     Tobacco Use     Smoking status: Light Smoker     Types: Cigarettes     Smokeless tobacco: Never   Substance Use Topics     Alcohol use: Yes     Comment: Alcoholic Drinks/day: rare     Drug use: No        Current Outpatient Medications   Medication Sig Dispense Refill     aspirin 81 MG EC tablet [ASPIRIN 81 MG EC TABLET] Take 81 mg by mouth daily.       atorvastatin (LIPITOR) 40 MG tablet TAKE 1 TABLET BY MOUTH EVERY DAY 90 tablet 3     blood glucose (NO BRAND SPECIFIED) test strip Use to test blood sugar 1 times daily or as directed. 100 strip 3     blood glucose monitoring (NO BRAND SPECIFIED) meter device kit Use to test blood sugar 1 times daily or as directed. 1 kit 0     blood glucose test (ACCU-CHEK GUIDE TEST STRIPS) strips [BLOOD GLUCOSE TEST (ACCU-CHEK " "GUIDE TEST STRIPS) STRIPS] Use 1 each As Directed daily. Dispense brand per patient's insurance at pharmacy discretion. 50 strip 3     cholecalciferol, vitamin D3, (VITAMIN D3) 2,000 unit capsule [CHOLECALCIFEROL, VITAMIN D3, (VITAMIN D3) 2,000 UNIT CAPSULE] Take 1,000 Units by mouth daily.       glipiZIDE (GLUCOTROL XL) 10 MG 24 hr tablet TAKE 1 TABLET BY MOUTH TWICE A  tablet 3     lisinopril-hydrochlorothiazide (ZESTORETIC) 20-12.5 MG tablet TAKE 1 TABLET BY MOUTH EVERY DAY 90 tablet 3     metFORMIN (GLUCOPHAGE) 1000 MG tablet TAKE 1 TABLET BY MOUTH TWICE A DAY WITH MEALS 180 tablet 3          Objective:    Vitals:    05/04/23 0901   BP: 110/60   Pulse: 75   Resp: 18   Temp: 97.2  F (36.2  C)   SpO2: 96%   Weight: 92.5 kg (204 lb)   Height: 1.715 m (5' 7.5\")      Body mass index is 31.48 kg/m .    Alert.  No apparent distress.  Monofilament testing normal.  No ankle edema.  Dorsalis pedis and posterior tibial pulses slightly reduced.  Blood pressure 112/58 on recheck.  Cardiac exam regular.      This note has been dictated using voice recognition software and as a result may contain minor grammatical errors and unintended word substitutions.         Answers for HPI/ROS submitted by the patient on 5/4/2023  Frequency of checking blood sugars:: a few times a month  What time of day are you checking your blood sugars : before and after meals  Have you had any blood sugars above 200?: No  Have you had any blood sugars below 70?: No  Hypoglycemia symptoms:: none  Diabetic concerns:: other  Paraesthesia present:: none of these symptoms  How many servings of fruits and vegetables do you eat daily?: 0-1  On average, how many sweetened beverages do you drink each day (Examples: soda, juice, sweet tea, etc.  Do NOT count diet or artificially sweetened beverages)?: 0  How many minutes a day do you exercise enough to make your heart beat faster?: 9 or less  How many days a week do you exercise enough to make your " heart beat faster?: 3 or less  How many days per week do you miss taking your medication?: 0

## 2023-05-05 DIAGNOSIS — R97.20 ELEVATED PROSTATE SPECIFIC ANTIGEN (PSA): Primary | ICD-10-CM

## 2023-07-10 ENCOUNTER — TRANSFERRED RECORDS (OUTPATIENT)
Dept: HEALTH INFORMATION MANAGEMENT | Facility: CLINIC | Age: 68
End: 2023-07-10
Payer: COMMERCIAL

## 2023-07-10 LAB — RETINOPATHY: NEGATIVE

## 2023-07-20 ENCOUNTER — TRANSFERRED RECORDS (OUTPATIENT)
Dept: HEALTH INFORMATION MANAGEMENT | Facility: CLINIC | Age: 68
End: 2023-07-20
Payer: COMMERCIAL

## 2023-08-17 ENCOUNTER — TRANSFERRED RECORDS (OUTPATIENT)
Dept: HEALTH INFORMATION MANAGEMENT | Facility: CLINIC | Age: 68
End: 2023-08-17
Payer: COMMERCIAL

## 2023-09-05 ENCOUNTER — OFFICE VISIT (OUTPATIENT)
Dept: FAMILY MEDICINE | Facility: CLINIC | Age: 68
End: 2023-09-05
Attending: FAMILY MEDICINE
Payer: COMMERCIAL

## 2023-09-05 VITALS
WEIGHT: 198.5 LBS | DIASTOLIC BLOOD PRESSURE: 50 MMHG | BODY MASS INDEX: 30.08 KG/M2 | RESPIRATION RATE: 18 BRPM | TEMPERATURE: 97.6 F | HEART RATE: 80 BPM | OXYGEN SATURATION: 96 % | HEIGHT: 68 IN | SYSTOLIC BLOOD PRESSURE: 100 MMHG

## 2023-09-05 DIAGNOSIS — E11.319 TYPE 2 DIABETES MELLITUS WITH RETINOPATHY, WITHOUT LONG-TERM CURRENT USE OF INSULIN, MACULAR EDEMA PRESENCE UNSPECIFIED, UNSPECIFIED LATERALITY, UNSPECIFIED RETINOPATHY SEVERITY (H): Primary | ICD-10-CM

## 2023-09-05 DIAGNOSIS — N18.31 STAGE 3A CHRONIC KIDNEY DISEASE (H): ICD-10-CM

## 2023-09-05 DIAGNOSIS — I10 ESSENTIAL HYPERTENSION: ICD-10-CM

## 2023-09-05 DIAGNOSIS — E78.5 HYPERLIPIDEMIA, UNSPECIFIED HYPERLIPIDEMIA TYPE: ICD-10-CM

## 2023-09-05 DIAGNOSIS — R97.20 ELEVATED PROSTATE SPECIFIC ANTIGEN (PSA): ICD-10-CM

## 2023-09-05 LAB
ANION GAP SERPL CALCULATED.3IONS-SCNC: 14 MMOL/L (ref 7–15)
BUN SERPL-MCNC: 28.6 MG/DL (ref 8–23)
CALCIUM SERPL-MCNC: 10 MG/DL (ref 8.8–10.2)
CHLORIDE SERPL-SCNC: 103 MMOL/L (ref 98–107)
CREAT SERPL-MCNC: 1.38 MG/DL (ref 0.67–1.17)
DEPRECATED HCO3 PLAS-SCNC: 24 MMOL/L (ref 22–29)
GFR SERPL CREATININE-BSD FRML MDRD: 56 ML/MIN/1.73M2
GLUCOSE SERPL-MCNC: 181 MG/DL (ref 70–99)
HBA1C MFR BLD: 6.8 % (ref 0–5.6)
HOLD SPECIMEN: NORMAL
HOLD SPECIMEN: NORMAL
POTASSIUM SERPL-SCNC: 4.4 MMOL/L (ref 3.4–5.3)
SODIUM SERPL-SCNC: 141 MMOL/L (ref 136–145)

## 2023-09-05 PROCEDURE — 36415 COLL VENOUS BLD VENIPUNCTURE: CPT | Performed by: FAMILY MEDICINE

## 2023-09-05 PROCEDURE — 80048 BASIC METABOLIC PNL TOTAL CA: CPT | Performed by: FAMILY MEDICINE

## 2023-09-05 PROCEDURE — 99214 OFFICE O/P EST MOD 30 MIN: CPT | Performed by: FAMILY MEDICINE

## 2023-09-05 PROCEDURE — 83036 HEMOGLOBIN GLYCOSYLATED A1C: CPT | Performed by: FAMILY MEDICINE

## 2023-09-05 ASSESSMENT — PAIN SCALES - GENERAL: PAINLEVEL: NO PAIN (0)

## 2023-09-05 NOTE — LETTER
September 5, 2023      Clif Christie  [address] [place] [address]        Dear ,    We are writing to inform you of your test results.    Continue your current diabetes management as discussed in order to further improve.  Goal A1c remains < 7.0% ideally.     Your kidney function remains reduced.  It appears stable.  We will continue to follow closely.     Resulted Orders   Hemoglobin A1c   Result Value Ref Range    Hemoglobin A1C 6.8 (H) 0.0 - 5.6 %      Comment:      Normal <5.7%   Prediabetes 5.7-6.4%    Diabetes 6.5% or higher     Note: Adopted from ADA consensus guidelines.   Basic metabolic panel   Result Value Ref Range    Sodium 141 136 - 145 mmol/L    Potassium 4.4 3.4 - 5.3 mmol/L    Chloride 103 98 - 107 mmol/L    Carbon Dioxide (CO2) 24 22 - 29 mmol/L    Anion Gap 14 7 - 15 mmol/L    Urea Nitrogen 28.6 (H) 8.0 - 23.0 mg/dL    Creatinine 1.38 (H) 0.67 - 1.17 mg/dL    Calcium 10.0 8.8 - 10.2 mg/dL    Glucose 181 (H) 70 - 99 mg/dL    GFR Estimate 56 (L) >60 mL/min/1.73m2       If you have any questions or concerns, please call the clinic at the number listed above.       Sincerely,      Mark Lin MD

## 2023-09-05 NOTE — PROGRESS NOTES
Assessment/Plan:    Type 2 diabetes mellitus with retinopathy, without long-term current use of insulin, macular edema presence unspecified, unspecified laterality, unspecified retinopathy severity (H)  Type 2 diabetes reviewed.  A1c improved from from 7% down to 6.8% today.  Continuing use of metformin 1000 mg twice daily with meals.  Continuing glipizide XL 10 mg twice daily as well.  Reassess at annual wellness visit in 4 months.  Recent microalbumin screen normal May 4, 2023.  Foot exam previously normal.  Diabetic eye exams to continue yearly with eye exam described July 2023.   - Hemoglobin A1c  - Basic metabolic panel    Essential Hypertension  Hypertension appears well controlled with lisinopril hydrochlorothiazide 20/12.5 using 1 tablet daily.    Hyperlipidemia, unspecified hyperlipidemia type  Patient continuing use of atorvastatin 40 mg daily.    Stage 3a chronic kidney disease (H)  CKD stage IIIa.  Prior creatinine 1.38 and GFR 56.  Ensure stable renal function.  - Basic metabolic panel    Elevated prostate specific antigen (PSA)  Elevated PSA described most recently 4.81 with active surveillance.  Did complete recent MRI in the last 2 weeks with urology however has not heard the results.  Is scheduled for repeat lab testing January 11, 2024 with follow-up with urologist January 18, 2024 to review results.      The following high BMI interventions were performed this visit: encouragement to exercise, weight monitoring, weight loss from baseline weight, and lifestyle education regarding diet .  Ensure ongoing efforts to achieve weight goal < 195 pounds initially, < 190 pounds ideally.         Subjective:    SarwatHEBERT Christie is seen today for follow-up assessment.  Type 2 diabetes reviewed.  Metformin 1000 mg twice daily continuing.  Diabetic eye exam in July 2023 per history.  Patient also continuing glipizide XL 10 mg twice daily.  No hypoglycemic episodes.  Atorvastatin 40 mg daily for lipid  "management.  Aspirin 81 mg daily continuing.  Patient utilizes lisinopril hydrochlorothiazide 20/12.5 using 1 tablet daily.  Denies recent illness.  Has lost 6 pounds since prior office visit May 4, 2023.  Due to mildly elevated PSA has seen urology with most recent PSA 4.81.  Had described prostate MRI done in the last couple weeks however has not heard the results.  Is scheduled to follow-up with urology for labs on January 11 as well as in person January 18, 2024.  Comprehensive review of systems as above otherwise all negative.    S.O. 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     Family History   Problem Relation Age of Onset    Diabetes Mother     Diabetes Father     Diabetes Brother         Past Medical History:   Diagnosis Date    Diabetes mellitus (H)     Hypertension         Social History     Tobacco Use    Smoking status: Light Smoker     Types: Cigarettes    Smokeless tobacco: Never   Vaping Use    Vaping Use: Never used   Substance Use Topics    Alcohol use: Yes     Comment: Alcoholic Drinks/day: rare    Drug use: No        Current Outpatient Medications   Medication Sig Dispense Refill    aspirin 81 MG EC tablet [ASPIRIN 81 MG EC TABLET] Take 81 mg by mouth daily.      atorvastatin (LIPITOR) 40 MG tablet TAKE 1 TABLET BY MOUTH EVERY DAY 90 tablet 3    blood glucose (NO BRAND SPECIFIED) test strip Use to test blood sugar 1 times daily or as directed. 100 strip 3    blood glucose monitoring (NO BRAND SPECIFIED) meter device kit Use to test blood sugar 1 times daily or as directed. 1 kit 0    blood glucose test (ACCU-CHEK GUIDE TEST STRIPS) strips [BLOOD " "GLUCOSE TEST (ACCU-CHEK GUIDE TEST STRIPS) STRIPS] Use 1 each As Directed daily. Dispense brand per patient's insurance at pharmacy discretion. 50 strip 3    cholecalciferol, vitamin D3, (VITAMIN D3) 2,000 unit capsule [CHOLECALCIFEROL, VITAMIN D3, (VITAMIN D3) 2,000 UNIT CAPSULE] Take 1,000 Units by mouth daily.      glipiZIDE (GLUCOTROL XL) 10 MG 24 hr tablet TAKE 1 TABLET BY MOUTH TWICE A  tablet 3    lisinopril-hydrochlorothiazide (ZESTORETIC) 20-12.5 MG tablet TAKE 1 TABLET BY MOUTH EVERY DAY 90 tablet 3    metFORMIN (GLUCOPHAGE) 1000 MG tablet TAKE 1 TABLET BY MOUTH TWICE A DAY WITH MEALS 180 tablet 3          Objective:    Vitals:    09/05/23 0925   BP: 100/50   Pulse: 80   Resp: 18   Temp: 97.6  F (36.4  C)   TempSrc: Oral   SpO2: 96%   Weight: 90 kg (198 lb 8 oz)   Height: 1.715 m (5' 7.5\")      Body mass index is 30.63 kg/m .    Alert.  No apparent distress.  BMI 30.63.  Cardiac exam regular.  Chest clear.      This note has been dictated using voice recognition software and as a result may contain minor grammatical errors and unintended word substitutions. Answers submitted by the patient for this visit:  Hypertension Visit (Submitted on 9/5/2023)  Chief Complaint: Chronic problems general questions HPI Form  Do you check your blood pressure regularly outside of the clinic?: No  Are your blood pressures ever more than 140 on the top number (systolic) OR more than 90 on the bottom number (diastolic)? (For example, greater than 140/90): No  Are you following a low salt diet?: Yes  Diabetes Visit (Submitted on 9/5/2023)  Chief Complaint: Chronic problems general questions HPI Form  Frequency of checking blood sugars:: a few times a month  What time of day are you checking your blood sugars : before meals  Have you had any blood sugars above 200?: Yes  Have you had any blood sugars below 70?: No  Hypoglycemia symptoms:: none  Diabetic concerns:: none  Paraesthesia present:: none of these " symptoms  General Questionnaire (Submitted on 9/5/2023)  Chief Complaint: Chronic problems general questions HPI Form  How many servings of fruits and vegetables do you eat daily?: 0-1  On average, how many sweetened beverages do you drink each day (Examples: soda, juice, sweet tea, etc.  Do NOT count diet or artificially sweetened beverages)?: 0  How many minutes a day do you exercise enough to make your heart beat faster?: 9 or less  How many days a week do you exercise enough to make your heart beat faster?: 3 or less  How many days per week do you miss taking your medication?: 0

## 2023-09-21 ENCOUNTER — TRANSFERRED RECORDS (OUTPATIENT)
Dept: HEALTH INFORMATION MANAGEMENT | Facility: CLINIC | Age: 68
End: 2023-09-21
Payer: COMMERCIAL

## 2023-11-12 DIAGNOSIS — E11.319 TYPE 2 DIABETES MELLITUS WITH RETINOPATHY, WITHOUT LONG-TERM CURRENT USE OF INSULIN, MACULAR EDEMA PRESENCE UNSPECIFIED, UNSPECIFIED LATERALITY, UNSPECIFIED RETINOPATHY SEVERITY (H): ICD-10-CM

## 2023-11-13 RX ORDER — GLIPIZIDE 10 MG/1
TABLET, FILM COATED, EXTENDED RELEASE ORAL
Qty: 180 TABLET | Refills: 3 | Status: SHIPPED | OUTPATIENT
Start: 2023-11-13

## 2024-01-13 DIAGNOSIS — I10 ESSENTIAL HYPERTENSION: ICD-10-CM

## 2024-01-13 DIAGNOSIS — E78.2 MIXED HYPERLIPIDEMIA: ICD-10-CM

## 2024-01-15 RX ORDER — ATORVASTATIN CALCIUM 40 MG/1
TABLET, FILM COATED ORAL
Qty: 90 TABLET | Refills: 0 | Status: SHIPPED | OUTPATIENT
Start: 2024-01-15 | End: 2024-04-12

## 2024-01-15 RX ORDER — LISINOPRIL AND HYDROCHLOROTHIAZIDE 12.5; 2 MG/1; MG/1
TABLET ORAL
Qty: 90 TABLET | Refills: 0 | Status: SHIPPED | OUTPATIENT
Start: 2024-01-15 | End: 2024-04-12

## 2024-01-24 ENCOUNTER — OFFICE VISIT (OUTPATIENT)
Dept: FAMILY MEDICINE | Facility: CLINIC | Age: 69
End: 2024-01-24
Payer: COMMERCIAL

## 2024-01-24 VITALS
HEART RATE: 89 BPM | RESPIRATION RATE: 17 BRPM | TEMPERATURE: 97.7 F | DIASTOLIC BLOOD PRESSURE: 60 MMHG | SYSTOLIC BLOOD PRESSURE: 100 MMHG | HEIGHT: 67 IN | WEIGHT: 201 LBS | OXYGEN SATURATION: 98 % | BODY MASS INDEX: 31.55 KG/M2

## 2024-01-24 DIAGNOSIS — I10 ESSENTIAL HYPERTENSION: ICD-10-CM

## 2024-01-24 DIAGNOSIS — E11.319 TYPE 2 DIABETES MELLITUS WITH RETINOPATHY, WITHOUT LONG-TERM CURRENT USE OF INSULIN, MACULAR EDEMA PRESENCE UNSPECIFIED, UNSPECIFIED LATERALITY, UNSPECIFIED RETINOPATHY SEVERITY (H): ICD-10-CM

## 2024-01-24 DIAGNOSIS — E66.09 CLASS 1 OBESITY DUE TO EXCESS CALORIES WITH SERIOUS COMORBIDITY AND BODY MASS INDEX (BMI) OF 31.0 TO 31.9 IN ADULT: ICD-10-CM

## 2024-01-24 DIAGNOSIS — E66.811 CLASS 1 OBESITY DUE TO EXCESS CALORIES WITH SERIOUS COMORBIDITY AND BODY MASS INDEX (BMI) OF 31.0 TO 31.9 IN ADULT: ICD-10-CM

## 2024-01-24 DIAGNOSIS — N18.31 STAGE 3A CHRONIC KIDNEY DISEASE (H): ICD-10-CM

## 2024-01-24 DIAGNOSIS — E80.6 HYPERBILIRUBINEMIA: ICD-10-CM

## 2024-01-24 DIAGNOSIS — D12.6 TUBULAR ADENOMA OF COLON: ICD-10-CM

## 2024-01-24 DIAGNOSIS — E55.9 VITAMIN D DEFICIENCY: ICD-10-CM

## 2024-01-24 DIAGNOSIS — Z00.00 ENCOUNTER FOR MEDICARE ANNUAL WELLNESS EXAM: Primary | ICD-10-CM

## 2024-01-24 DIAGNOSIS — E78.2 MIXED HYPERLIPIDEMIA: ICD-10-CM

## 2024-01-24 DIAGNOSIS — R97.20 ELEVATED PROSTATE SPECIFIC ANTIGEN (PSA): ICD-10-CM

## 2024-01-24 LAB
ALBUMIN SERPL BCG-MCNC: 4.4 G/DL (ref 3.5–5.2)
ALP SERPL-CCNC: 74 U/L (ref 40–150)
ALT SERPL W P-5'-P-CCNC: 30 U/L (ref 0–70)
ANION GAP SERPL CALCULATED.3IONS-SCNC: 15 MMOL/L (ref 7–15)
AST SERPL W P-5'-P-CCNC: 22 U/L (ref 0–45)
BILIRUB SERPL-MCNC: 1 MG/DL
BUN SERPL-MCNC: 21.4 MG/DL (ref 8–23)
CALCIUM SERPL-MCNC: 9.6 MG/DL (ref 8.8–10.2)
CHLORIDE SERPL-SCNC: 102 MMOL/L (ref 98–107)
CHOLEST SERPL-MCNC: 118 MG/DL
CREAT SERPL-MCNC: 1.29 MG/DL (ref 0.67–1.17)
DEPRECATED HCO3 PLAS-SCNC: 24 MMOL/L (ref 22–29)
EGFRCR SERPLBLD CKD-EPI 2021: 60 ML/MIN/1.73M2
ERYTHROCYTE [DISTWIDTH] IN BLOOD BY AUTOMATED COUNT: 12.8 % (ref 10–15)
GLUCOSE SERPL-MCNC: 197 MG/DL (ref 70–99)
HBA1C MFR BLD: 7.1 % (ref 0–5.6)
HCT VFR BLD AUTO: 48.1 % (ref 40–53)
HDLC SERPL-MCNC: 36 MG/DL
HGB BLD-MCNC: 16.4 G/DL (ref 13.3–17.7)
HOLD SPECIMEN: NORMAL
HOLD SPECIMEN: NORMAL
LDLC SERPL CALC-MCNC: 51 MG/DL
MCH RBC QN AUTO: 29.3 PG (ref 26.5–33)
MCHC RBC AUTO-ENTMCNC: 34.1 G/DL (ref 31.5–36.5)
MCV RBC AUTO: 86 FL (ref 78–100)
NONHDLC SERPL-MCNC: 82 MG/DL
PLATELET # BLD AUTO: 274 10E3/UL (ref 150–450)
POTASSIUM SERPL-SCNC: 4.9 MMOL/L (ref 3.4–5.3)
PROT SERPL-MCNC: 7.4 G/DL (ref 6.4–8.3)
RBC # BLD AUTO: 5.59 10E6/UL (ref 4.4–5.9)
SODIUM SERPL-SCNC: 141 MMOL/L (ref 135–145)
TRIGL SERPL-MCNC: 153 MG/DL
VIT D+METAB SERPL-MCNC: 58 NG/ML (ref 20–50)
WBC # BLD AUTO: 8.6 10E3/UL (ref 4–11)

## 2024-01-24 PROCEDURE — G0439 PPPS, SUBSEQ VISIT: HCPCS | Performed by: FAMILY MEDICINE

## 2024-01-24 PROCEDURE — 36415 COLL VENOUS BLD VENIPUNCTURE: CPT | Performed by: FAMILY MEDICINE

## 2024-01-24 PROCEDURE — 85027 COMPLETE CBC AUTOMATED: CPT | Performed by: FAMILY MEDICINE

## 2024-01-24 PROCEDURE — 83036 HEMOGLOBIN GLYCOSYLATED A1C: CPT | Performed by: FAMILY MEDICINE

## 2024-01-24 PROCEDURE — 82306 VITAMIN D 25 HYDROXY: CPT | Performed by: FAMILY MEDICINE

## 2024-01-24 PROCEDURE — 99214 OFFICE O/P EST MOD 30 MIN: CPT | Mod: 25 | Performed by: FAMILY MEDICINE

## 2024-01-24 PROCEDURE — 80053 COMPREHEN METABOLIC PANEL: CPT | Performed by: FAMILY MEDICINE

## 2024-01-24 PROCEDURE — 80061 LIPID PANEL: CPT | Performed by: FAMILY MEDICINE

## 2024-01-24 RX ORDER — RESPIRATORY SYNCYTIAL VIRUS VACCINE 120MCG/0.5
0.5 KIT INTRAMUSCULAR ONCE
Qty: 1 EACH | Refills: 0 | Status: CANCELLED | OUTPATIENT
Start: 2024-01-24 | End: 2024-01-24

## 2024-01-24 ASSESSMENT — ENCOUNTER SYMPTOMS
NERVOUS/ANXIOUS: 0
HEMATURIA: 0
ARTHRALGIAS: 0
SHORTNESS OF BREATH: 0
HEARTBURN: 0
NAUSEA: 0
CONSTIPATION: 0
MYALGIAS: 0
PALPITATIONS: 0
EYE PAIN: 0
PARESTHESIAS: 0
DIARRHEA: 0
FREQUENCY: 1
HEADACHES: 0
ABDOMINAL PAIN: 0
WEAKNESS: 0
JOINT SWELLING: 0
DIZZINESS: 0
COUGH: 1
SORE THROAT: 0
CHILLS: 0
FEVER: 0
HEMATOCHEZIA: 0
DYSURIA: 0

## 2024-01-24 ASSESSMENT — PAIN SCALES - GENERAL: PAINLEVEL: NO PAIN (0)

## 2024-01-24 ASSESSMENT — ACTIVITIES OF DAILY LIVING (ADL): CURRENT_FUNCTION: NO ASSISTANCE NEEDED

## 2024-01-24 NOTE — LETTER
January 25, 2024      Clif Christie  [address] [place] [address]        Dear ,    We are writing to inform you of your test results.    Continue your current diabetes management as discussed in order to further improve.  Goal A1c remains < 7.0% ideally.     Your complete blood count results were normal.  No evidence for anemia, etc.     Your cholesterol results were near goal.  Continue your current medication as discussed.  Continue your current medication as discussed.  Ensure regular exercise, healthy diet, and weight loss modifications in order to further improve.  Weight goal < 195 pounds initially, < 190 pounds ideally.  We will plan to recheck your labs while fasting in the next 6-12 months to ensure desired improvement.      Your kidney function remains slightly reduced.  Ensure adequate hydration.  We will continue to follow closely.     Cut back on your vitamin D supplement from 4,000 units instead down to 2,000 units daily since your vitamin D level was slightly elevated.      Resulted Orders   Hemoglobin A1c   Result Value Ref Range    Hemoglobin A1C 7.1 (H) 0.0 - 5.6 %      Comment:      Normal <5.7%   Prediabetes 5.7-6.4%    Diabetes 6.5% or higher     Note: Adopted from ADA consensus guidelines.   CBC with platelets   Result Value Ref Range    WBC Count 8.6 4.0 - 11.0 10e3/uL    RBC Count 5.59 4.40 - 5.90 10e6/uL    Hemoglobin 16.4 13.3 - 17.7 g/dL    Hematocrit 48.1 40.0 - 53.0 %    MCV 86 78 - 100 fL    MCH 29.3 26.5 - 33.0 pg    MCHC 34.1 31.5 - 36.5 g/dL    RDW 12.8 10.0 - 15.0 %    Platelet Count 274 150 - 450 10e3/uL   Lipid panel reflex to direct LDL Fasting   Result Value Ref Range    Cholesterol 118 <200 mg/dL    Triglycerides 153 (H) <150 mg/dL    Direct Measure HDL 36 (L) >=40 mg/dL    LDL Cholesterol Calculated 51 <=100 mg/dL    Non HDL Cholesterol 82 <130 mg/dL    Narrative    Cholesterol  Desirable:  <200 mg/dL    Triglycerides  Normal:  Less than 150  mg/dL  Borderline High:  150-199 mg/dL  High:  200-499 mg/dL  Very High:  Greater than or equal to 500 mg/dL    Direct Measure HDL  Female:  Greater than or equal to 50 mg/dL   Male:  Greater than or equal to 40 mg/dL    LDL Cholesterol  Desirable:  <100mg/dL  Above Desirable:  100-129 mg/dL   Borderline High:  130-159 mg/dL   High:  160-189 mg/dL   Very High:  >= 190 mg/dL    Non HDL Cholesterol  Desirable:  130 mg/dL  Above Desirable:  130-159 mg/dL  Borderline High:  160-189 mg/dL  High:  190-219 mg/dL  Very High:  Greater than or equal to 220 mg/dL   Comprehensive metabolic panel   Result Value Ref Range    Sodium 141 135 - 145 mmol/L      Comment:      Reference intervals for this test were updated on 09/26/2023 to more accurately reflect our healthy population. There may be differences in the flagging of prior results with similar values performed with this method. Interpretation of those prior results can be made in the context of the updated reference intervals.     Potassium 4.9 3.4 - 5.3 mmol/L    Carbon Dioxide (CO2) 24 22 - 29 mmol/L    Anion Gap 15 7 - 15 mmol/L    Urea Nitrogen 21.4 8.0 - 23.0 mg/dL    Creatinine 1.29 (H) 0.67 - 1.17 mg/dL    GFR Estimate 60 (L) >60 mL/min/1.73m2    Calcium 9.6 8.8 - 10.2 mg/dL    Chloride 102 98 - 107 mmol/L    Glucose 197 (H) 70 - 99 mg/dL    Alkaline Phosphatase 74 40 - 150 U/L      Comment:      Reference intervals for this test were updated on 11/14/2023 to more accurately reflect our healthy population. There may be differences in the flagging of prior results with similar values performed with this method. Interpretation of those prior results can be made in the context of the updated reference intervals.    AST 22 0 - 45 U/L      Comment:      Reference intervals for this test were updated on 6/12/2023 to more accurately reflect our healthy population. There may be differences in the flagging of prior results with similar values performed with this method.  Interpretation of those prior results can be made in the context of the updated reference intervals.    ALT 30 0 - 70 U/L      Comment:      Reference intervals for this test were updated on 6/12/2023 to more accurately reflect our healthy population. There may be differences in the flagging of prior results with similar values performed with this method. Interpretation of those prior results can be made in the context of the updated reference intervals.      Protein Total 7.4 6.4 - 8.3 g/dL    Albumin 4.4 3.5 - 5.2 g/dL    Bilirubin Total 1.0 <=1.2 mg/dL   Vitamin D Deficiency   Result Value Ref Range    Vitamin D, Total (25-Hydroxy) 58 (H) 20 - 50 ng/mL      Comment:      indicates supplementation, with increased risk of hypercalciuria    Narrative    Season, race, dietary intake, and treatment affect the concentration of 25-hydroxy-Vitamin D. Values may decrease during winter months and increase during summer months.    Vitamin D determination is routinely performed by an immunoassay specific for 25 hydroxyvitamin D3.  If an individual is on vitamin D2(ergocalciferol) supplementation, please specify 25 OH vitamin D2 and D3 level determination by LCMSMS test [id number].         If you have any questions or concerns, please call the clinic at the number listed above.       Sincerely,      Mark Lin MD

## 2024-01-24 NOTE — PATIENT INSTRUCTIONS
Patient Education   Personalized Prevention Plan  You are due for the preventive services outlined below.  Your care team is available to assist you in scheduling these services.  If you have already completed any of these items, please share that information with your care team to update in your medical record.  Health Maintenance Due   Topic Date Due     LUNG CANCER SCREENING  Never done     RSV VACCINE (Pregnancy & 60+) (1 - 1-dose 60+ series) Never done     Diabetic Foot Exam  12/23/2022     Flu Vaccine (1) Never done     COVID-19 Vaccine (6 - 2023-24 season) 09/01/2023     Cholesterol Lab  12/30/2023     ANNUAL REVIEW OF HM ORDERS  12/30/2023     Hemoglobin  12/30/2023

## 2024-01-24 NOTE — PROGRESS NOTES
"Preventive Care Visit  [facility]  Mark Lin MD, Family Medicine  Jan 24, 2024      SUBJECTIVE:   Clif is a 68 year old, presenting for the following:  Medicare Visit (Pt is fasting)        1/24/2024     9:25 AM   Additional Questions   Roomed by      Are you in the first 12 months of your Medicare coverage?  No      Annual wellness visit completed.  Risk questionnaire reviewed in detail.  3 pound weight gain since prior office visit September 5, 2027.  Continues metformin XR 1000 mg twice daily and glipizide XL 10 mg twice daily.  No hypoglycemic episodes.  No GI distress or diarrhea.  Lisinopril hydrochlorothiazide 20/12.5 daily for hypertension.  Atorvastatin 40 mg daily for lipid management.  PSA elevation historically did have MRI as well as recent TRUS with biopsy x 12 without evidence for prostate cancer.  Has follow-up upcoming with urology.  CKD stage IIIa historically with creatinine 1.38 and GFR 56.  Patient has had tubular adenomas x 5 on colonoscopy August 31, 2016.  Followed up September 26, 2019 with hyperplastic polyp and then told to follow-up in 5 years following.  Patient declines COVID vaccination.  Microalbumin screen was normal May 4, 2023.  Declines updated COVID booster.  Uses vitamin D supplement 4000 units daily.  Has had mild bilirubin elevation historically likely component of Gilbert's syndrome.  Comprehensive review of systems as above otherwise all negative.      S.O. \"Johanna\"   1 daughter \"Charice\"   2 dogs - Valery Crouch (both are chihuahuas...)   Dad - dec 62 sudden death, DM   Mom - DM   3 younger bros - Kurtis (DM), Billy, and Will   [facility]   [email or web address] ([facility])   < 1/2 ppd - quit 12/08 (occasional cheat with cigarette...) - quit 3/13/13 again   Infrequent EtOH   No surgeries   No hospitalizations       Healthy Habits:     In general, how would you rate your overall health?  Good    Frequency of exercise:  None    Do you usually eat at " "least 4 servings of fruit and vegetables a day, include whole grains    & fiber and avoid regularly eating high fat or \"junk\" foods?  No    Taking medications regularly:  Yes    Medication side effects:  None    Ability to successfully perform activities of daily living:  No assistance needed    Home Safety:  No safety concerns identified    Hearing Impairment:  Difficulty following a conversation in a noisy restaurant or crowded room, feel that people are mumbling or not speaking clearly, need to ask people to speak up or repeat themselves, find that men's voices are easier to understand than woman's, difficulty understanding soft or whispered speech and difficulty understanding speech on the telephone    In the past 6 months, have you been bothered by leaking of urine?  No    In general, how would you rate your overall mental or emotional health?  Good    Additional concerns today:  No        Have you ever done Advance Care Planning? (For example, a Health Directive, POLST, or a discussion with a medical provider or your loved ones about your wishes): No, advance care planning information given to patient to review.  Patient plans to discuss their wishes with loved ones or provider.         Fall risk  Fallen 2 or more times in the past year?: No  Any fall with injury in the past year?: No    Cognitive Screening   1) Repeat 3 items (Leader, Season, Table)    2) Clock draw: NORMAL  3) 3 item recall: Recalls 2 objects   Results: NORMAL clock, 1-2 items recalled: COGNITIVE IMPAIRMENT LESS LIKELY    Mini-CogTM Copyright [employer]. Licensed by the author for use in [facility]; reprinted with permission (leila@.[facility]). All rights reserved.      Do you have sleep apnea, excessive snoring or daytime drowsiness? : no    Reviewed and updated as needed this visit by clinical staff   Tobacco  Allergies  Meds  Problems             Reviewed and updated as needed this visit by Provider    Allergies  Meds  " Problems             Social History     Tobacco Use    Smoking status: Light Smoker     Types: Cigarettes    Smokeless tobacco: Never   Substance Use Topics    Alcohol use: Yes     Comment: Alcoholic Drinks/day: rare             1/24/2024     9:39 AM   Alcohol Use   Prescreen: >3 drinks/day or >7 drinks/week? No          No data to display              Do you have a current opioid prescription? No  Do you use any other controlled substances or medications that are not prescribed by a provider? None              Current providers sharing in care for this patient include:   Patient Care Team:  Mark Lin MD as PCP - General  Mark Lin MD as Assigned PCP    The following health maintenance items are reviewed in Epic and correct as of today:  Health Maintenance   Topic Date Due    LUNG CANCER SCREENING  Never done    RSV VACCINE (Pregnancy & 60+) (1 - 1-dose 60+ series) Never done    DIABETIC FOOT EXAM  12/23/2022    INFLUENZA VACCINE (1) Never done    COVID-19 Vaccine (6 - 2023-24 season) 09/01/2023    LIPID  12/30/2023    HEMOGLOBIN  12/30/2023    MICROALBUMIN  05/04/2024    EYE EXAM  07/10/2024    A1C  07/24/2024    BMP  09/05/2024    COLORECTAL CANCER SCREENING  09/26/2024    NICOTINE/TOBACCO CESSATION COUNSELING Q 1 YR  01/24/2025    MEDICARE ANNUAL WELLNESS VISIT  01/24/2025    ANNUAL REVIEW OF HM ORDERS  01/24/2025    FALL RISK ASSESSMENT  01/24/2025    DTAP/TDAP/TD IMMUNIZATION (3 - Td or Tdap) 07/05/2027    ADVANCE CARE PLANNING  01/24/2029    HEPATITIS C SCREENING  Completed    PHQ-2 (once per calendar year)  Completed    Pneumococcal Vaccine: 65+ Years  Completed    URINALYSIS  Completed    ZOSTER IMMUNIZATION  Completed    AORTIC ANEURYSM SCREENING (SYSTEM ASSIGNED)  Completed    IPV IMMUNIZATION  Aged Out    HPV IMMUNIZATION  Aged Out    MENINGITIS IMMUNIZATION  Aged Out    RSV MONOCLONAL ANTIBODY  Aged Out     Lab work is in process  Labs reviewed in EPIC  BP Readings from Last 3  Encounters:   01/24/24 100/60   09/05/23 100/50   05/04/23 110/60    Wt Readings from Last 3 Encounters:   01/24/24 91.2 kg (201 lb)   09/05/23 90 kg (198 lb 8 oz)   05/04/23 92.5 kg (204 lb)                  Patient Active Problem List   Diagnosis    Dupuytren's Contracture    Type 2 diabetes mellitus with retinopathy, without long-term current use of insulin, macular edema presence unspecified, unspecified laterality, unspecified retinopathy severity (H)    Hyperlipidemia    Obesity    Essential Hypertension    Tubular adenoma of colon    Cataracts, bilateral    Vitreous floaters of both eyes    Vitamin D deficiency    Asymmetrical sensorineural hearing loss    Stage 3a chronic kidney disease (H)    Hyperbilirubinemia     No past surgical history on file.    Social History     Tobacco Use    Smoking status: Light Smoker     Types: Cigarettes    Smokeless tobacco: Never   Substance Use Topics    Alcohol use: Yes     Comment: Alcoholic Drinks/day: rare     Family History   Problem Relation Age of Onset    Diabetes Mother     Diabetes Father     Diabetes Brother          Current Outpatient Medications   Medication Sig Dispense Refill    aspirin 81 MG EC tablet [ASPIRIN 81 MG EC TABLET] Take 81 mg by mouth daily.      atorvastatin (LIPITOR) 40 MG tablet TAKE 1 TABLET BY MOUTH EVERY DAY 90 tablet 0    blood glucose (NO BRAND SPECIFIED) test strip Use to test blood sugar 1 times daily or as directed. 100 strip 3    blood glucose monitoring (NO BRAND SPECIFIED) meter device kit Use to test blood sugar 1 times daily or as directed. 1 kit 0    blood glucose test (ACCU-CHEK GUIDE TEST STRIPS) strips [BLOOD GLUCOSE TEST (ACCU-CHEK GUIDE TEST STRIPS) STRIPS] Use 1 each As Directed daily. Dispense brand per patient's insurance at pharmacy discretion. 50 strip 3    cholecalciferol, vitamin D3, (VITAMIN D3) 2,000 unit capsule [CHOLECALCIFEROL, VITAMIN D3, (VITAMIN D3) 2,000 UNIT CAPSULE] Take 1,000 Units by mouth daily.       glipiZIDE (GLUCOTROL XL) 10 MG 24 hr tablet TAKE 1 TABLET BY MOUTH TWICE A  tablet 3    lisinopril-hydrochlorothiazide (ZESTORETIC) 20-12.5 MG tablet TAKE 1 TABLET BY MOUTH EVERY DAY 90 tablet 0    metFORMIN (GLUCOPHAGE) 1000 MG tablet TAKE 1 TABLET BY MOUTH TWICE A DAY WITH MEALS 180 tablet 2     No Known Allergies  Recent Labs   Lab Test 01/24/24  0930 09/05/23  0914 05/04/23  0906 12/30/22  1103 08/30/22  0930 04/26/22  0933 12/23/21  1042 08/24/21  0840 04/22/21  1000 04/22/21  0922 12/22/20  0900   A1C 7.1* 6.8* 7.0*  --    < > 7.4*  --    < >  --    < >  --    LDL  --   --   --  53  --   --  53  --  56  --   --    HDL  --   --   --  38*  --   --  37*  --  33*  --   --    TRIG  --   --   --  131  --   --  145  --  160*  --   --    ALT  --   --   --  33  --  38 32  --  36  --   --    CR  --  1.38* 1.31* 1.38*   < > 1.43* 1.25   < > 1.40*  --  1.30   GFRESTIMATED  --  56* 59* 56*   < > 54* 63   < > 51*  --  55*   GFRESTBLACK  --   --   --   --   --   --   --   --  >60  --  >60   POTASSIUM  --  4.4 3.8 4.9   < > 4.5 4.1   < > 4.5  --  4.1    < > = values in this interval not displayed.        Declines COVID vaccine.  Consider RSV at pharmacy.      Review of Systems   Constitutional:  Negative for chills and fever.   HENT:  Positive for hearing loss. Negative for congestion, ear pain and sore throat.    Eyes:  Negative for pain and visual disturbance.   Respiratory:  Positive for cough. Negative for shortness of breath.    Cardiovascular:  Negative for chest pain and palpitations.   Gastrointestinal:  Negative for abdominal pain, constipation, diarrhea and nausea.   Genitourinary:  Positive for frequency and urgency. Negative for dysuria, genital sores, hematuria and penile discharge.   Musculoskeletal:  Negative for arthralgias, joint swelling and myalgias.   Skin:  Negative for rash.   Neurological:  Negative for dizziness, weakness and headaches.   Psychiatric/Behavioral:  The patient is not  "nervous/anxious.         Review of Systems  Constitutional, HEENT, cardiovascular, pulmonary, GI, , musculoskeletal, neuro, skin, endocrine and psych systems are negative, except as otherwise noted.    OBJECTIVE:   /60   Pulse 89   Temp 97.7  F (36.5  C)   Resp 17   Ht 1.708 m (5' 7.25\")   Wt 91.2 kg (201 lb)   SpO2 98%   BMI 31.25 kg/m     Estimated body mass index is 31.25 kg/m  as calculated from the following:    Height as of this encounter: 1.708 m (5' 7.25\").    Weight as of this encounter: 91.2 kg (201 lb).    Physical Exam  GENERAL: alert and no distress  EYES: Eyes grossly normal to inspection, PERRL and conjunctivae and sclerae normal  HENT: ear canals and TM's normal, nose and mouth without ulcers or lesions  NECK: no adenopathy, no asymmetry, masses, or scars  RESP: lungs clear to auscultation - no rales, rhonchi or wheezes  CV: regular rate and rhythm, normal S1 S2, no S3 or S4, no murmur, click or rub, no peripheral edema  ABDOMEN: soft, nontender, no hepatosplenomegaly, no masses and bowel sounds normal  MS: no gross musculoskeletal defects noted, no edema  SKIN: no suspicious lesions or rashes  NEURO: Normal strength and tone, mentation intact and speech normal  PSYCH: mentation appears normal, affect normal/bright  LYMPH: no cervical, supraclavicular, axillary, or inguinal adenopathy    Diagnostic Test Results:  Labs reviewed in Epic  Results for orders placed or performed in visit on 01/24/24 (from the past 24 hour(s))   Hemoglobin A1c   Result Value Ref Range    Hemoglobin A1C 7.1 (H) 0.0 - 5.6 %   Extra Tube    Narrative    The following orders were created for panel order Extra Tube.  Procedure                               Abnormality         Status                     ---------                               -----------         ------                     Extra Red Top Tube[[id number]]                               In process                 Extra Green Top (Lithium...[[id number]]   "                    In process                   Please view results for these tests on the individual orders.       ASSESSMENT / PLAN:     Encounter for Medicare annual wellness exam  Annual wellness visit completed.  Risk questionnaire reviewed.  Annual wellness visits to continue.    Type 2 diabetes mellitus with retinopathy, without long-term current use of insulin, macular edema presence unspecified, unspecified laterality, unspecified retinopathy severity (H)  Type 2 diabetes with slight worsening from 6.8% to 7.1% with 3 pound weight gain since prior office visit September 5, 2023.  Weight goal less than 190 pounds ideally.  Patient will continue metformin  mg using 2 tablets twice daily plus glipizide XL 10 mg twice daily.  Reassess at follow-up in 4 months and will repeat microalbumin screen at that time.  Diabetic eye exams to continue annually.  Monofilament testing today was normal.  - Lipid panel reflex to direct LDL Fasting  - Hemoglobin A1c  - Comprehensive metabolic panel    Essential hypertension  Utilizing lisinopril hydrochlorothiazide 20/12.5 daily and tolerating well with excellent blood pressure result.  - Comprehensive metabolic panel    Mixed hyperlipidemia  Patient remains on atorvastatin 40 mg daily for lipid management.  Update lipid cascade today while fasting.  - Lipid panel reflex to direct LDL Fasting    Stage 3a chronic kidney disease (H)  History of CKD stage IIIa.  Update renal function.  Ensure adequate hydration.  Ensure normal vitamin D and hemoglobin levels.  - Comprehensive metabolic panel  - CBC with platelets    Class 1 obesity due to excess calories with serious comorbidity and body mass index (BMI) of 31.0 to 31.9 in adult  Dietary and exercise modification for weight goal less than 195 pounds initially, less than 190 pounds ideally.    Tubular adenoma of colon  There are adenomas of colon dating back to colonoscopy August 31, 2016 with 5 tubular adenomas noted and  "told to repeat at 3 years interval completed September 26, 2019 with hyperplastic polyp with recommendation for 5-year follow-up at that time.  Will complete this fall.    Elevated prostate specific antigen (PSA)  Elevated PSA and has seen by with urology and did have prostate MRI and TRUS with biopsy x 12 without evidence for prostate cancer.  Has upcoming follow-up with urology.    Vitamin D deficiency  Vitamin D supplement using 4000 units daily currently.  Ensure adequate replacement.  - Vitamin D Deficiency    Hyperbilirubinemia  History of mild bilirubin elevation consistent with Gilbert's syndrome and will update CBC and comprehensive metabolic panel.  - Comprehensive metabolic panel  - CBC with platelets       Patient has been advised of split billing requirements and indicates understanding: Yes      Counseling  Reviewed preventive health counseling, as reflected in patient instructions       Regular exercise       Healthy diet/nutrition       Vision screening       Hearing screening       Dental care       Bladder control       Fall risk prevention       Aspirin prophylaxis        Colon cancer screening       Prostate cancer screening      BMI  Estimated body mass index is 31.25 kg/m  as calculated from the following:    Height as of this encounter: 1.708 m (5' 7.25\").    Weight as of this encounter: 91.2 kg (201 lb).   Weight management plan: Discussed healthy diet and exercise guidelines      He reports that he has been smoking cigarettes. He has never used smokeless tobacco.  Nicotine/Tobacco Cessation Plan  Information offered: Patient not interested at this time      Appropriate preventive services were discussed with this patient, including applicable screening as appropriate for fall prevention, nutrition, physical activity, Tobacco-use cessation, weight loss and cognition.  Checklist reviewing preventive services available has been given to the patient.    Reviewed patients plan of care and " provided an AVS. The Basic Care Plan (routine screening as documented in Health Maintenance) for Clif meets the Care Plan requirement. This Care Plan has been established and reviewed with the Patient.          Signed Electronically by: Mark Lin MD    Identified Health Risks  I have reviewed Opioid Use Disorder and Substance Use Disorder risk factors and made any needed referrals.   Answers submitted by the patient for this visit:  Annual Preventive Visit (Submitted on 1/24/2024)  Chief Complaint: Annual Exam:  Blood in stool: No  heartburn: No  peripheral edema: No  mood changes: No  Skin sensation changes: No  impotence: Yes

## 2024-02-13 ENCOUNTER — PATIENT OUTREACH (OUTPATIENT)
Dept: GASTROENTEROLOGY | Facility: CLINIC | Age: 69
End: 2024-02-13
Payer: COMMERCIAL

## 2024-03-13 ENCOUNTER — TRANSFERRED RECORDS (OUTPATIENT)
Dept: HEALTH INFORMATION MANAGEMENT | Facility: CLINIC | Age: 69
End: 2024-03-13
Payer: COMMERCIAL

## 2024-04-12 DIAGNOSIS — I10 ESSENTIAL HYPERTENSION: ICD-10-CM

## 2024-04-12 DIAGNOSIS — E78.2 MIXED HYPERLIPIDEMIA: ICD-10-CM

## 2024-04-12 RX ORDER — ATORVASTATIN CALCIUM 40 MG/1
TABLET, FILM COATED ORAL
Qty: 90 TABLET | Refills: 2 | Status: SHIPPED | OUTPATIENT
Start: 2024-04-12

## 2024-04-12 RX ORDER — LISINOPRIL AND HYDROCHLOROTHIAZIDE 12.5; 2 MG/1; MG/1
TABLET ORAL
Qty: 90 TABLET | Refills: 3 | Status: SHIPPED | OUTPATIENT
Start: 2024-04-12

## 2024-05-28 ENCOUNTER — OFFICE VISIT (OUTPATIENT)
Dept: FAMILY MEDICINE | Facility: CLINIC | Age: 69
End: 2024-05-28
Payer: COMMERCIAL

## 2024-05-28 VITALS
HEART RATE: 93 BPM | TEMPERATURE: 97.8 F | SYSTOLIC BLOOD PRESSURE: 110 MMHG | OXYGEN SATURATION: 96 % | BODY MASS INDEX: 30.46 KG/M2 | RESPIRATION RATE: 15 BRPM | DIASTOLIC BLOOD PRESSURE: 70 MMHG | HEIGHT: 68 IN | WEIGHT: 201 LBS

## 2024-05-28 DIAGNOSIS — N18.31 STAGE 3A CHRONIC KIDNEY DISEASE (H): ICD-10-CM

## 2024-05-28 DIAGNOSIS — L91.8 SKIN TAG: ICD-10-CM

## 2024-05-28 DIAGNOSIS — D12.6 TUBULAR ADENOMA OF COLON: ICD-10-CM

## 2024-05-28 DIAGNOSIS — E11.319 TYPE 2 DIABETES MELLITUS WITH RETINOPATHY, WITHOUT LONG-TERM CURRENT USE OF INSULIN, MACULAR EDEMA PRESENCE UNSPECIFIED, UNSPECIFIED LATERALITY, UNSPECIFIED RETINOPATHY SEVERITY (H): Primary | ICD-10-CM

## 2024-05-28 DIAGNOSIS — E78.5 HYPERLIPIDEMIA, UNSPECIFIED HYPERLIPIDEMIA TYPE: ICD-10-CM

## 2024-05-28 DIAGNOSIS — R97.20 ELEVATED PROSTATE SPECIFIC ANTIGEN (PSA): ICD-10-CM

## 2024-05-28 DIAGNOSIS — I10 ESSENTIAL HYPERTENSION: ICD-10-CM

## 2024-05-28 DIAGNOSIS — E55.9 VITAMIN D DEFICIENCY: ICD-10-CM

## 2024-05-28 LAB
ANION GAP SERPL CALCULATED.3IONS-SCNC: 11 MMOL/L (ref 7–15)
BUN SERPL-MCNC: 29.1 MG/DL (ref 8–23)
CALCIUM SERPL-MCNC: 9.8 MG/DL (ref 8.8–10.2)
CHLORIDE SERPL-SCNC: 104 MMOL/L (ref 98–107)
CREAT SERPL-MCNC: 1.39 MG/DL (ref 0.67–1.17)
DEPRECATED HCO3 PLAS-SCNC: 27 MMOL/L (ref 22–29)
EGFRCR SERPLBLD CKD-EPI 2021: 55 ML/MIN/1.73M2
GLUCOSE SERPL-MCNC: 137 MG/DL (ref 70–99)
HBA1C MFR BLD: 7.2 % (ref 0–5.6)
HOLD SPECIMEN: NORMAL
POTASSIUM SERPL-SCNC: 4.8 MMOL/L (ref 3.4–5.3)
SODIUM SERPL-SCNC: 142 MMOL/L (ref 135–145)
VIT D+METAB SERPL-MCNC: 51 NG/ML (ref 20–50)

## 2024-05-28 PROCEDURE — 11200 RMVL SKIN TAGS UP TO&INC 15: CPT | Performed by: FAMILY MEDICINE

## 2024-05-28 PROCEDURE — 80048 BASIC METABOLIC PNL TOTAL CA: CPT | Performed by: FAMILY MEDICINE

## 2024-05-28 PROCEDURE — 82306 VITAMIN D 25 HYDROXY: CPT | Performed by: FAMILY MEDICINE

## 2024-05-28 PROCEDURE — 36415 COLL VENOUS BLD VENIPUNCTURE: CPT | Performed by: FAMILY MEDICINE

## 2024-05-28 PROCEDURE — 99214 OFFICE O/P EST MOD 30 MIN: CPT | Mod: 25 | Performed by: FAMILY MEDICINE

## 2024-05-28 PROCEDURE — 83036 HEMOGLOBIN GLYCOSYLATED A1C: CPT | Performed by: FAMILY MEDICINE

## 2024-05-28 RX ORDER — RESPIRATORY SYNCYTIAL VIRUS VACCINE 120MCG/0.5
0.5 KIT INTRAMUSCULAR ONCE
Qty: 1 EACH | Refills: 0 | Status: CANCELLED | OUTPATIENT
Start: 2024-05-28 | End: 2024-05-28

## 2024-05-28 ASSESSMENT — PAIN SCALES - GENERAL: PAINLEVEL: NO PAIN (0)

## 2024-05-28 NOTE — PROGRESS NOTES
Assessment/Plan:    Type 2 diabetes mellitus with retinopathy, without long-term current use of insulin, macular edema presence unspecified, unspecified laterality, unspecified retinopathy severity (H)  Type 2 diabetes reviewed.  A1c increasing slightly from 7.1% up to 7.2% since January 24, 2024.  Continues metformin XR 1000 mg twice daily and glipizide XL 10 mg twice daily.  Will review at follow-up consideration for SGLT2 inhibitor with history of CKD stage IIIa as well.  - Hemoglobin A1c  - Hemoglobin A1c    Stage 3a chronic kidney disease (H)  Recent creatinine 1.29 and GFR 16 will update renal function today.  Consider SGLT2 inhibitor at scheduled follow-up visit in 4 months.  States will give urine specimen at follow-up.    Essential Hypertension  Hypertension treated with lisinopril hydrochlorothiazide 20/12.5 daily.    Hyperlipidemia, unspecified hyperlipidemia type  Atorvastatin 40 mg daily continuing.  Lipid cascade near goal of January 24, 2024.    Skin tag  Skin tags x 2 removed typical fashion left lateral hip as well as right anteromedial thigh.  Tolerated well.  Good hemostasis.  - Removal of up to 15 skin tags    Vitamin D deficiency  Vitamin D deficiency.  Vitamin D levels elevated at prior evaluation and did decrease dose from 4000 units down to 2000 units daily.  Update vitamin D level.    Elevated prostate specific antigen (PSA)  PSA elevation.  Prior TRUS with biopsy x 12 without cancer.  MRI prostate twice with most recent test around April 11, 2024 described to [facility] without evidence for prostate cancer.  Has apparent repeat diagnostic PSA June 13, 2024 with urologist.    Tubular adenoma of colon  Prior tubular adenomas x 5 on colonoscopy August 31, 2016 and told to repeat at 3-year interval.  Send follow-up colonoscopy September 26, 2019 with tubular adenoma and told to repeat at 5-year interval and will complete September 2024    The longitudinal plan of care for the  "diagnosis(es)/condition(s) as documented were addressed during this visit. Due to the added complexity in care, I will continue to support Clif in the subsequent management and with ongoing continuity of care.            Subjective:    Clif Christie is seen today for follow-up assessment.  Type 2 diabetes.  Metformin XR 1000 mg twice daily and glipizide XL 10 mg twice daily.  Tolerating well.  Needs refill on test strips.  No hypoglycemic episodes.  Lisinopril hydrochlorothiazide 20/12.5 daily for hypertension atorvastatin 40 mg daily for lipid management.  CKD stage IIIa historically with creatinine 1.29 GFR 60.  Vitamin D levels have been elevated while on 4000 units in the past and did recommend decreasing to 2000 units which she has been doing since January 24, 2024.  Tubular adenomas on colonoscopy most recent September 26, 2019 with 5-year follow-up recommendation.  Has had elevated PSAs historically with workup including prostate MRI and TRUS with biopsies negative for cancer.  Has repeat diagnostic PSA June 13, 2024 with urologist.  Comprehensive review of systems as above otherwise all negative.      S.O. \"Johanna\"  1 daughter \"Charice\"  2 dogs - Valery Crouch (both are chihuahuas...)  Dad - dec 62 sudden death, DM  Mom - DM  3 younger bros - Kurtis (DM), Billy, and Will  [employer] [employer] [employer] [facility]  < 1/2 ppd - quit 12/08 (occasional cheat with cigarette...) - quit 3/13/13 again  Infrequent EtOH  No surgeries  No hospitalizations         No past surgical history on file.     Family History   Problem Relation Age of Onset    Diabetes Mother     Diabetes Father     Diabetes Brother         Past Medical History:   Diagnosis Date    Diabetes mellitus (H)     Hypertension         Social History     Tobacco Use    Smoking status: Light Smoker     Types: Cigarettes    Smokeless tobacco: Never   Vaping Use    Vaping status: Never Used   Substance Use Topics    Alcohol use: Yes     Comment: " "Alcoholic Drinks/day: rare    Drug use: No        Current Outpatient Medications   Medication Sig Dispense Refill    aspirin 81 MG EC tablet [ASPIRIN 81 MG EC TABLET] Take 81 mg by mouth daily.      atorvastatin (LIPITOR) 40 MG tablet TAKE 1 TABLET BY MOUTH EVERY DAY 90 tablet 2    blood glucose (NO BRAND SPECIFIED) test strip Use to test blood sugar 1 times daily or as directed. 100 strip 3    blood glucose monitoring (NO BRAND SPECIFIED) meter device kit Use to test blood sugar 1 times daily or as directed. 1 kit 0    blood glucose test (ACCU-CHEK GUIDE TEST STRIPS) strips [BLOOD GLUCOSE TEST (ACCU-CHEK GUIDE TEST STRIPS) STRIPS] Use 1 each As Directed daily. Dispense brand per patient's insurance at pharmacy discretion. 50 strip 3    cholecalciferol, vitamin D3, (VITAMIN D3) 2,000 unit capsule [CHOLECALCIFEROL, VITAMIN D3, (VITAMIN D3) 2,000 UNIT CAPSULE] Take 1,000 Units by mouth daily.      glipiZIDE (GLUCOTROL XL) 10 MG 24 hr tablet TAKE 1 TABLET BY MOUTH TWICE A  tablet 3    lisinopril-hydrochlorothiazide (ZESTORETIC) 20-12.5 MG tablet TAKE 1 TABLET BY MOUTH EVERY DAY 90 tablet 3    metFORMIN (GLUCOPHAGE) 1000 MG tablet TAKE 1 TABLET BY MOUTH TWICE A DAY WITH MEALS 180 tablet 2          Objective:    Vitals:    05/28/24 0910   BP: 110/70   Pulse: 93   Resp: 15   Temp: 97.8  F (36.6  C)   SpO2: 96%   Weight: 91.2 kg (201 lb)   Height: 1.715 m (5' 7.5\")      Body mass index is 31.02 kg/m .    Skin tags x 2 left lateral hip as well as right anteromedial thigh proximally.  Mild inflammation.  Chest clear.  Cardiac exam regular.  Extremities warm and dry.      This note has been dictated using voice recognition software and as a result may contain minor grammatical errors and unintended word substitutions.   Answers submitted by the patient for this visit:  Diabetes Visit (Submitted on 5/28/2024)  Chief Complaint: Chronic problems general questions HPI Form  Frequency of checking blood sugars:: a few times " a month  What time of day are you checking your blood sugars : before meals  Have you had any blood sugars above 200?: No  Have you had any blood sugars below 70?: No  Hypoglycemia symptoms:: none  Diabetic concerns:: none  Paraesthesia present:: none of these symptoms  General Questionnaire (Submitted on 5/28/2024)  Chief Complaint: Chronic problems general questions HPI Form  How many servings of fruits and vegetables do you eat daily?: 0-1  On average, how many sweetened beverages do you drink each day (Examples: soda, juice, sweet tea, etc.  Do NOT count diet or artificially sweetened beverages)?: 0  How many minutes a day do you exercise enough to make your heart beat faster?: 9 or less  How many days a week do you exercise enough to make your heart beat faster?: 3 or less  How many days per week do you miss taking your medication?: 0

## 2024-05-28 NOTE — LETTER
May 28, 2024      Clif Christie  [address] [place] [address]        Dear ,    We are writing to inform you of your test results.    Continue your current diabetes management as discussed in order to further improve.  Goal A1c remains < 7.0% ideally.     Your vitamin D level has improved.  Continue your current management of vitamin D 2,000 units daily (instead of 4,000 units daily previously).  We will continue to follow closely.     Your kidney function remains mildly reduced.  It appears stable.  We will continue to follow closely.     Resulted Orders   Hemoglobin A1c   Result Value Ref Range    Hemoglobin A1C 7.2 (H) 0.0 - 5.6 %      Comment:      Normal <5.7%   Prediabetes 5.7-6.4%    Diabetes 6.5% or higher     Note: Adopted from ADA consensus guidelines.   Vitamin D Deficiency   Result Value Ref Range    Vitamin D, Total (25-Hydroxy) 51 (H) 20 - 50 ng/mL      Comment:      indicates supplementation, with increased risk of hypercalciuria    Narrative    Season, race, dietary intake, and treatment affect the concentration of 25-hydroxy-Vitamin D. Values may decrease during winter months and increase during summer months.    Vitamin D determination is routinely performed by an immunoassay specific for 25 hydroxyvitamin D3.  If an individual is on vitamin D2(ergocalciferol) supplementation, please specify 25 OH vitamin D2 and D3 level determination by LCMSMS test VITD23.     Basic metabolic panel   Result Value Ref Range    Sodium 142 135 - 145 mmol/L      Comment:      Reference intervals for this test were updated on 09/26/2023 to more accurately reflect our healthy population. There may be differences in the flagging of prior results with similar values performed with this method. Interpretation of those prior results can be made in the context of the updated reference intervals.     Potassium 4.8 3.4 - 5.3 mmol/L    Chloride 104 98 - 107 mmol/L    Carbon Dioxide (CO2) 27 22  - 29 mmol/L    Anion Gap 11 7 - 15 mmol/L    Urea Nitrogen 29.1 (H) 8.0 - 23.0 mg/dL    Creatinine 1.39 (H) 0.67 - 1.17 mg/dL    GFR Estimate 55 (L) >60 mL/min/1.73m2    Calcium 9.8 8.8 - 10.2 mg/dL    Glucose 137 (H) 70 - 99 mg/dL       If you have any questions or concerns, please call the clinic at the number listed above.       Sincerely,      Mark Lin MD

## 2024-06-26 ENCOUNTER — PATIENT OUTREACH (OUTPATIENT)
Dept: GASTROENTEROLOGY | Facility: CLINIC | Age: 69
End: 2024-06-26
Payer: COMMERCIAL

## 2024-08-14 DIAGNOSIS — E11.319 TYPE 2 DIABETES MELLITUS WITH RETINOPATHY, WITHOUT LONG-TERM CURRENT USE OF INSULIN, MACULAR EDEMA PRESENCE UNSPECIFIED, UNSPECIFIED LATERALITY, UNSPECIFIED RETINOPATHY SEVERITY (H): ICD-10-CM

## 2024-09-27 ENCOUNTER — TRANSFERRED RECORDS (OUTPATIENT)
Dept: HEALTH INFORMATION MANAGEMENT | Facility: CLINIC | Age: 69
End: 2024-09-27
Payer: COMMERCIAL

## 2024-10-01 ENCOUNTER — OFFICE VISIT (OUTPATIENT)
Dept: FAMILY MEDICINE | Facility: CLINIC | Age: 69
End: 2024-10-01
Payer: COMMERCIAL

## 2024-10-01 VITALS
HEART RATE: 80 BPM | TEMPERATURE: 98 F | WEIGHT: 198 LBS | BODY MASS INDEX: 30.01 KG/M2 | OXYGEN SATURATION: 97 % | RESPIRATION RATE: 13 BRPM | HEIGHT: 68 IN | SYSTOLIC BLOOD PRESSURE: 120 MMHG | DIASTOLIC BLOOD PRESSURE: 70 MMHG

## 2024-10-01 DIAGNOSIS — I10 ESSENTIAL HYPERTENSION: ICD-10-CM

## 2024-10-01 DIAGNOSIS — N18.31 STAGE 3A CHRONIC KIDNEY DISEASE (H): ICD-10-CM

## 2024-10-01 DIAGNOSIS — E78.5 HYPERLIPIDEMIA, UNSPECIFIED HYPERLIPIDEMIA TYPE: ICD-10-CM

## 2024-10-01 DIAGNOSIS — E11.319 TYPE 2 DIABETES MELLITUS WITH RETINOPATHY, WITHOUT LONG-TERM CURRENT USE OF INSULIN, MACULAR EDEMA PRESENCE UNSPECIFIED, UNSPECIFIED LATERALITY, UNSPECIFIED RETINOPATHY SEVERITY (H): Primary | ICD-10-CM

## 2024-10-01 DIAGNOSIS — D12.6 TUBULAR ADENOMA OF COLON: ICD-10-CM

## 2024-10-01 LAB
EST. AVERAGE GLUCOSE BLD GHB EST-MCNC: 157 MG/DL
HBA1C MFR BLD: 7.1 % (ref 0–5.6)
HOLD SPECIMEN: NORMAL
HOLD SPECIMEN: NORMAL

## 2024-10-01 PROCEDURE — 36415 COLL VENOUS BLD VENIPUNCTURE: CPT | Performed by: FAMILY MEDICINE

## 2024-10-01 PROCEDURE — 99214 OFFICE O/P EST MOD 30 MIN: CPT | Performed by: FAMILY MEDICINE

## 2024-10-01 PROCEDURE — 83036 HEMOGLOBIN GLYCOSYLATED A1C: CPT | Performed by: FAMILY MEDICINE

## 2024-10-01 ASSESSMENT — PAIN SCALES - GENERAL: PAINLEVEL: NO PAIN (0)

## 2024-10-01 NOTE — PROGRESS NOTES
"Assessment/Plan:    Type 2 diabetes mellitus with retinopathy, without long-term current use of insulin, macular edema presence unspecified, unspecified laterality, unspecified retinopathy severity (H)  Type 2 diabetes reviewed.  Appears stable.  A1c improved from 7.2% to 7.1% today.  Continuing glipizide XL 10 mg daily and metformin 1000 mg twice daily.  Reassess at annual wellness visit in 4 months.  Monofilament testing today was normal.  Prior microalbumin screen normal May 4, 2023.  Recent diabetic eye exam in July 2024 without describe retinopathy.  - Hemoglobin A1c    Essential Hypertension  Continuing use of lisinopril hydrochlorothiazide 20/12.5 daily.    Hyperlipidemia, unspecified hyperlipidemia type  Continuing use of atorvastatin 40 mg daily.    Stage 3a chronic kidney disease (H)  History of CKD stage IIIa.  Prior creatinine 1.39 and GFR 55 and update renal function today.    Tubular adenoma of colon  Tubular adenomas historically with repeat colonoscopy September 27, 2024 noted.  Results not currently available.  Patient describes 2 small polyps.  Will review pathology at follow-up for confirmation of 5-year follow-up recommendation.               Subjective:    SarwatHEBERT Christie is seen today for follow-up assessment.  Type 2 diabetes.  Prior A1c at 7.2% May 28, 2024.  Continues dietary and exercise modifications and including glipizide XL 10 mg twice daily and metformin 1000 mg twice daily.  No hypoglycemic episodes.  Atorvastatin continuing 40 mg daily for lipid management with lisinopril hydrochlorothiazide 20/12.5 daily for hypertension management.  Denies recent illness.  Has had tubular adenomas historically and had repeat colonoscopy September 27, 2024 with \"2 small polyps\".  Will review pathology once available.  Comprehensive review of systems as above otherwise all negative.    No past surgical history on file.     Family History   Problem Relation Age of Onset    Diabetes Mother     " "Diabetes Father     Diabetes Brother         Past Medical History:   Diagnosis Date    Diabetes mellitus (H)     Hypertension         Social History     Tobacco Use    Smoking status: Light Smoker     Types: Cigarettes    Smokeless tobacco: Never   Vaping Use    Vaping status: Never Used   Substance Use Topics    Alcohol use: Yes     Comment: Alcoholic Drinks/day: rare    Drug use: No        Current Outpatient Medications   Medication Sig Dispense Refill    aspirin 81 MG EC tablet [ASPIRIN 81 MG EC TABLET] Take 81 mg by mouth daily.      atorvastatin (LIPITOR) 40 MG tablet TAKE 1 TABLET BY MOUTH EVERY DAY 90 tablet 2    blood glucose (NO BRAND SPECIFIED) test strip Use to test blood sugar 1 times daily or as directed. 100 strip 3    blood glucose monitoring (NO BRAND SPECIFIED) meter device kit Use to test blood sugar 1 times daily or as directed. 1 kit 0    blood glucose test (ACCU-CHEK GUIDE TEST STRIPS) strips [BLOOD GLUCOSE TEST (ACCU-CHEK GUIDE TEST STRIPS) STRIPS] Use 1 each As Directed daily. Dispense brand per patient's insurance at pharmacy discretion. 50 strip 3    cholecalciferol, vitamin D3, (VITAMIN D3) 2,000 unit capsule [CHOLECALCIFEROL, VITAMIN D3, (VITAMIN D3) 2,000 UNIT CAPSULE] Take 1,000 Units by mouth daily.      glipiZIDE (GLUCOTROL XL) 10 MG 24 hr tablet TAKE 1 TABLET BY MOUTH TWICE A  tablet 3    lisinopril-hydrochlorothiazide (ZESTORETIC) 20-12.5 MG tablet TAKE 1 TABLET BY MOUTH EVERY DAY 90 tablet 3    metFORMIN (GLUCOPHAGE) 1000 MG tablet TAKE 1 TABLET BY MOUTH TWICE A DAY WITH FOOD 180 tablet 2          Objective:    Vitals:    10/01/24 1004   BP: 120/70   Pulse: 80   Resp: 13   Temp: 98  F (36.7  C)   SpO2: 97%   Weight: 89.8 kg (198 lb)   Height: 1.715 m (5' 7.5\")      Body mass index is 30.55 kg/m .    Alert.  No apparent distress.  Monofilament testing normal.  Dorsalis pedis and posterior tibial pulses normal.  Pes planus deformity bilateral.  No ankle edema.      This note " has been dictated using voice recognition software and as a result may contain minor grammatical errors and unintended word substitutions.         Answers submitted by the patient for this visit:  Diabetes Visit (Submitted on 10/1/2024)  Chief Complaint: Chronic problems general questions HPI Form  Frequency of checking blood sugars:: a few times a month  What time of day are you checking your blood sugars : before and after meals  Have you had any blood sugars above 200?: No  Have you had any blood sugars below 70?: No  Hypoglycemia symptoms:: none  Diabetic concerns:: none  Paraesthesia present:: none of these symptoms  General Questionnaire (Submitted on 10/1/2024)  Chief Complaint: Chronic problems general questions HPI Form  How many servings of fruits and vegetables do you eat daily?: 0-1  On average, how many sweetened beverages do you drink each day (Examples: soda, juice, sweet tea, etc.  Do NOT count diet or artificially sweetened beverages)?: 0  How many minutes a day do you exercise enough to make your heart beat faster?: 9 or less  How many days a week do you exercise enough to make your heart beat faster?: 3 or less  How many days per week do you miss taking your medication?: 0

## 2024-11-14 DIAGNOSIS — E11.319 TYPE 2 DIABETES MELLITUS WITH RETINOPATHY, WITHOUT LONG-TERM CURRENT USE OF INSULIN, MACULAR EDEMA PRESENCE UNSPECIFIED, UNSPECIFIED LATERALITY, UNSPECIFIED RETINOPATHY SEVERITY (H): ICD-10-CM

## 2024-11-14 RX ORDER — GLIPIZIDE 10 MG/1
TABLET, FILM COATED, EXTENDED RELEASE ORAL
Qty: 180 TABLET | Refills: 3 | Status: SHIPPED | OUTPATIENT
Start: 2024-11-14

## 2024-11-21 NOTE — PROGRESS NOTES
"Subjective:    Clif Christie is seen today for follow-up evaluation type 2 diabetes.  Continues metformin 1000 g twice daily and glipizide extended release 10 mg twice daily.  Continues aspirin 81 mg daily.  No hypoglycemic symptoms noted.  Remains on lisinopril hydrochlorothiazide 20/12.5 one tablet daily for hypertension as well as renal protective effects.  Atorvastatin 40 mg daily for lipid management.  Has gained 3 pounds since prior office visit October 25, 2016.  Has skin tag in right groin that he'd like to have removed because of pain and irritation frequently.  Comprehensive review of systems as above otherwise all negative.  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 No pertinent surgical history.     Family History   Problem Relation Age of Onset     Diabetes Mother      Diabetes Father      Diabetes Brother         Past Medical History:   Diagnosis Date     Diabetes mellitus      Hypertension         Social History   Substance Use Topics     Smoking status: Light Tobacco Smoker     Types: Cigarettes     Smokeless tobacco: Never Used     Alcohol use Yes      Comment: rare        Current Outpatient Prescriptions   Medication Sig Dispense Refill     aspirin 81 MG EC tablet Take 81 mg by mouth daily.       atorvastatin (LIPITOR) 40 MG tablet TAKE ONE TABLET BY MOUTH ONE TIME DAILY 90 tablet 2     blood sugar diagnostic (GLUCOSE BLOOD) Strp Accu-Chek [place] in vitro strips.  Test twice daily.       glipiZIDE (GLUCOTROL) 10 MG 24 hr tablet TAKE ONE TABLET BY MOUTH TWICE DAILY 180 tablet 2     " Received message from radiology- overdue orders- pt did not complete Ct chest ILD   Will My chart message pt to complete CT scan   lisinopril-hydrochlorothiazide (PRINZIDE,ZESTORETIC) 20-12.5 mg per tablet TAKE ONE TABLET BY MOUTH ONE TIME DAILY 90 tablet 2     metFORMIN (GLUCOPHAGE) 1000 MG tablet TAKE ONE TABLET BY MOUTH TWICE A DAY WITH MEALS 180 tablet 2     No current facility-administered medications for this visit.           Objective:    Vitals:    03/03/17 0747   BP: 110/60   Pulse: 80   Weight: 221 lb (100.2 kg)      Body mass index is 34.61 kg/(m^2).    Alert.  No apparent distress.  Chest clear.  Cardiac exam regular.  BMI 34.6.  Right groin skin Brad noted.  This was removed today in typical fashion without complication.  Extremities warm and dry.      Assessment:    1. Type 2 diabetes mellitus  Glycosylated Hemoglobin A1c    glipiZIDE (GLUCOTROL) 10 MG 24 hr tablet    metFORMIN (GLUCOPHAGE) 1000 MG tablet    Microalbumin, Random Urine    Basic Metabolic Panel   2. Hyperlipidemia, unspecified hyperlipidemia type  atorvastatin (LIPITOR) 40 MG tablet    Lipid Cascade   3. Essential hypertension with goal blood pressure less than 130/80  lisinopril-hydrochlorothiazide (PRINZIDE,ZESTORETIC) 20-12.5 mg per tablet    Basic Metabolic Panel   4. Skin tag           Plan:    A1c did increase slightly from 7.5% to 7.7%.  Dietary and exercise modifications for weight goal less than 210 pounds initially, less than 200 pounds ideally for follow-up physical exam in 4 months.  Check basic metabolic panel to ensure stable renal function as well as glycemic control.  Check microalbumin screen.  Did provide refills on all meds.  Skin tag removal as noted above.  With the cascade pending.

## 2024-12-04 ENCOUNTER — TRANSFERRED RECORDS (OUTPATIENT)
Dept: HEALTH INFORMATION MANAGEMENT | Facility: CLINIC | Age: 69
End: 2024-12-04
Payer: COMMERCIAL

## 2025-01-07 DIAGNOSIS — E78.2 MIXED HYPERLIPIDEMIA: ICD-10-CM

## 2025-01-07 RX ORDER — ATORVASTATIN CALCIUM 40 MG/1
40 TABLET, FILM COATED ORAL DAILY
Qty: 90 TABLET | Refills: 1 | Status: SHIPPED | OUTPATIENT
Start: 2025-01-07

## 2025-01-07 NOTE — TELEPHONE ENCOUNTER
Patient calling because he changed pharmacies, all orders transferred fine except the atorvastatin. Pharmacy is telling patient that a new order for atorvastatin needs to be sent.     Chart updated with new pharmacy preference.     Rx pended and routed to PCP.    EVERTON Smalls, RN  [facility]

## 2025-02-12 ENCOUNTER — OFFICE VISIT (OUTPATIENT)
Dept: FAMILY MEDICINE | Facility: CLINIC | Age: 70
End: 2025-02-12
Payer: COMMERCIAL

## 2025-02-12 VITALS
TEMPERATURE: 97.7 F | HEIGHT: 67 IN | OXYGEN SATURATION: 95 % | RESPIRATION RATE: 16 BRPM | SYSTOLIC BLOOD PRESSURE: 120 MMHG | DIASTOLIC BLOOD PRESSURE: 60 MMHG | WEIGHT: 204 LBS | HEART RATE: 95 BPM | BODY MASS INDEX: 32.02 KG/M2

## 2025-02-12 DIAGNOSIS — E55.9 VITAMIN D DEFICIENCY: ICD-10-CM

## 2025-02-12 DIAGNOSIS — E11.319 TYPE 2 DIABETES MELLITUS WITH RETINOPATHY, WITHOUT LONG-TERM CURRENT USE OF INSULIN, MACULAR EDEMA PRESENCE UNSPECIFIED, UNSPECIFIED LATERALITY, UNSPECIFIED RETINOPATHY SEVERITY (H): ICD-10-CM

## 2025-02-12 DIAGNOSIS — I10 ESSENTIAL HYPERTENSION: ICD-10-CM

## 2025-02-12 DIAGNOSIS — Z23 ENCOUNTER FOR IMMUNIZATION: ICD-10-CM

## 2025-02-12 DIAGNOSIS — E66.811 CLASS 1 OBESITY WITH SERIOUS COMORBIDITY AND BODY MASS INDEX (BMI) OF 31.0 TO 31.9 IN ADULT, UNSPECIFIED OBESITY TYPE: ICD-10-CM

## 2025-02-12 DIAGNOSIS — Z00.00 MEDICARE ANNUAL WELLNESS VISIT, SUBSEQUENT: Primary | ICD-10-CM

## 2025-02-12 DIAGNOSIS — N18.31 STAGE 3A CHRONIC KIDNEY DISEASE (H): ICD-10-CM

## 2025-02-12 DIAGNOSIS — E78.5 HYPERLIPIDEMIA, UNSPECIFIED HYPERLIPIDEMIA TYPE: ICD-10-CM

## 2025-02-12 LAB
ERYTHROCYTE [DISTWIDTH] IN BLOOD BY AUTOMATED COUNT: 12.4 % (ref 10–15)
EST. AVERAGE GLUCOSE BLD GHB EST-MCNC: 163 MG/DL
HBA1C MFR BLD: 7.3 % (ref 0–5.6)
HCT VFR BLD AUTO: 46.3 % (ref 40–53)
HGB BLD-MCNC: 15.5 G/DL (ref 13.3–17.7)
HOLD SPECIMEN: NORMAL
HOLD SPECIMEN: NORMAL
MCH RBC QN AUTO: 29.4 PG (ref 26.5–33)
MCHC RBC AUTO-ENTMCNC: 33.5 G/DL (ref 31.5–36.5)
MCV RBC AUTO: 88 FL (ref 78–100)
PLATELET # BLD AUTO: 237 10E3/UL (ref 150–450)
RBC # BLD AUTO: 5.28 10E6/UL (ref 4.4–5.9)
WBC # BLD AUTO: 7.1 10E3/UL (ref 4–11)

## 2025-02-12 PROCEDURE — 83036 HEMOGLOBIN GLYCOSYLATED A1C: CPT | Performed by: FAMILY MEDICINE

## 2025-02-12 PROCEDURE — 82306 VITAMIN D 25 HYDROXY: CPT | Performed by: FAMILY MEDICINE

## 2025-02-12 PROCEDURE — 80061 LIPID PANEL: CPT | Performed by: FAMILY MEDICINE

## 2025-02-12 PROCEDURE — 91320 SARSCV2 VAC 30MCG TRS-SUC IM: CPT | Performed by: FAMILY MEDICINE

## 2025-02-12 PROCEDURE — 82043 UR ALBUMIN QUANTITATIVE: CPT | Performed by: FAMILY MEDICINE

## 2025-02-12 PROCEDURE — 90480 ADMN SARSCOV2 VAC 1/ONLY CMP: CPT | Performed by: FAMILY MEDICINE

## 2025-02-12 PROCEDURE — 99214 OFFICE O/P EST MOD 30 MIN: CPT | Mod: 25 | Performed by: FAMILY MEDICINE

## 2025-02-12 PROCEDURE — 80053 COMPREHEN METABOLIC PANEL: CPT | Performed by: FAMILY MEDICINE

## 2025-02-12 PROCEDURE — G0439 PPPS, SUBSEQ VISIT: HCPCS | Performed by: FAMILY MEDICINE

## 2025-02-12 PROCEDURE — 36415 COLL VENOUS BLD VENIPUNCTURE: CPT | Performed by: FAMILY MEDICINE

## 2025-02-12 PROCEDURE — 82570 ASSAY OF URINE CREATININE: CPT | Performed by: FAMILY MEDICINE

## 2025-02-12 PROCEDURE — G2211 COMPLEX E/M VISIT ADD ON: HCPCS | Performed by: FAMILY MEDICINE

## 2025-02-12 PROCEDURE — 85027 COMPLETE CBC AUTOMATED: CPT | Performed by: FAMILY MEDICINE

## 2025-02-12 SDOH — HEALTH STABILITY: PHYSICAL HEALTH: ON AVERAGE, HOW MANY DAYS PER WEEK DO YOU ENGAGE IN MODERATE TO STRENUOUS EXERCISE (LIKE A BRISK WALK)?: 0 DAYS

## 2025-02-12 ASSESSMENT — SOCIAL DETERMINANTS OF HEALTH (SDOH): HOW OFTEN DO YOU GET TOGETHER WITH FRIENDS OR RELATIVES?: TWICE A WEEK

## 2025-02-12 ASSESSMENT — PAIN SCALES - GENERAL: PAINLEVEL_OUTOF10: NO PAIN (0)

## 2025-02-12 NOTE — PROGRESS NOTES
Preventive Care Visit    [facility]  Mark Lin MD, Family Medicine  Feb 12, 2025      Assessment & Plan     Medicare annual wellness visit, subsequent  Annual Wellness Visit completed.  Risk questionaire reviewed in detail.  Appropriate preventive services were discussed with this patient, including applicable screening as appropriate for fall prevention, nutrition, physical activity, tobacco-use cessation, weight loss, and cognition.  Annual Wellness Visits recommended to continue.     Class 1 obesity with serious comorbidity and body mass index (BMI) of 31.0 to 31.9 in adult, unspecified obesity type  Dietary and exercise modifications reviewed.  Weight goal remains less than 200 pounds initially, less than 190 pounds ideally.    Type 2 diabetes mellitus with retinopathy, without long-term current use of insulin, macular edema presence unspecified, unspecified laterality, unspecified retinopathy severity (H)  Type 2 diabetes has worsened slightly from 7.1% to 7.3% and will continue working on dietary and exercise routine having gained 6 pounds unfortunately since October 1, 2024.  Continues glipizide XL 10 mg twice daily metformin 1000 mg twice daily.  Reassess at follow-up in late June 2025.  Microalbumin screen to be updated.  Diabetic eye exams to continue annually.  Monofilament testing normal.  - Lipid panel reflex to direct LDL Fasting  - Hemoglobin A1c  - Albumin Random Urine Quantitative with Creat Ratio  - Lipid panel reflex to direct LDL Fasting    Stage 3a chronic kidney disease (H)  History of CKD stage IIIa and will update renal function today.  Ensure normal hemoglobin and vitamin D level.  - Comprehensive metabolic panel  - CBC with platelets  - CBC with platelets  - Comprehensive metabolic panel    Essential Hypertension  Hypertension appears stable with lisinopril hydrochlorothiazide 20/12.5 daily.  - Comprehensive metabolic panel  - Comprehensive metabolic  "panel    Hyperlipidemia, unspecified hyperlipidemia type  Remains on atorvastatin 40 mg daily.    Encounter for immunization  COVID booster updated.  - COVID-19 12+ (PFIZER)    Vitamin D deficiency  Vitamin D deficiency and cut back from 4000 units down to 2000 units daily.  Will update vitamin D level.  - Vitamin D Deficiency  - Vitamin D Deficiency      Patient has been advised of split billing requirements and indicates understanding: Yes        Nicotine/Tobacco Cessation  He reports that he has been smoking cigarettes. He has never used smokeless tobacco.  Nicotine/Tobacco Cessation Plan  Information offered: Patient not interested at this time      BMI  Estimated body mass index is 31.95 kg/m  as calculated from the following:    Height as of this encounter: 1.702 m (5' 7\").    Weight as of this encounter: 92.5 kg (204 lb).   Weight management plan: Discussed healthy diet and exercise guidelines    Counseling  Appropriate preventive services were addressed with this patient via screening, questionnaire, or discussion as appropriate for fall prevention, nutrition, physical activity, Tobacco-use cessation, social engagement, weight loss and cognition.  Checklist reviewing preventive services available has been given to the patient.  Reviewed patient's diet, addressing concerns and/or questions.   Discussed possible causes of fatigue. The patient was provided with written information regarding signs of hearing loss.           Jerry Menezes is a 69 year old, presenting for the following:  Medicare Visit (Pt is fasting)        2/12/2025    11:50 AM   Additional Questions   Roomed by [facility]    Patient seen today for annual wellness visit.  In general doing well.  6 pound weight gain since October 1, 2024.  Continues glipizide XL 10 mg twice daily metformin 1000 mg twice daily for diabetes management.  Lisinopril hydrochlorothiazide 20/12.5 using 1 tablet daily.  Atorvastatin 40 mg daily for lipid " "management.  Low-dose aspirin continuing.  Has had mild PSA elevations and is followed by urology and does have scheduled follow-up June 20, 2025.  Tubular adenomas on colonoscopy September 27, 2024 with 5-year follow-up recommendation.  Comprehensive review of systems as above otherwise all negative      S.O. 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      2/12/2025   General Health   How would you rate your overall physical health? Good   Feel stress (tense, anxious, or unable to sleep) Not at all         2/12/2025   Nutrition   Diet: Low salt    Low fat/cholesterol    Diabetic    Breakfast skipped       Multiple values from one day are sorted in reverse-chronological order         2/12/2025   Exercise   Days per week of moderate/strenous exercise 0 days   (!) EXERCISE CONCERN      2/12/2025   Social Factors   Frequency of gathering with friends or relatives Twice a week   Worry food won't last until get money to buy more No   Food not last or not have enough money for food? No   Do you have housing? (Housing is defined as stable permanent housing and does not include staying ouside in a car, in a tent, in an abandoned building, in an overnight shelter, or couch-surfing.) Yes   Are you worried about losing your housing? No   Lack of transportation? No   Unable to get utilities (heat,electricity)? No         2/12/2025   Fall Risk   Fallen 2 or more times in the past year? No   Trouble with walking or balance? No          2/12/2025   Activities of Daily Living- " Home Safety   Needs help with the following daily activites None of the above   Safety concerns in the home None of the above         2/12/2025   Dental   Dentist two times every year? Yes         2/12/2025   Hearing Screening   Hearing concerns? (!) I FEEL THAT PEOPLE ARE MUMBLING OR NOT SPEAKING CLEARLY.    (!) I NEED TO ASK PEOPLE TO SPEAK UP OR REPEAT THEMSELVES.    (!) IT'S HARDER TO UNDERSTAND WOMEN'S VOICES THAN MEN'S VOICES.    (!) IT'S HARD TO FOLLOW A CONVERSATION IN A NOISY RESTAURANT OR CROWDED ROOM.    (!) TROUBLE UNDESTANDING A SPEAKER IN A PUBLIC MEETING OR [employer] SERVICE.    (!) TROUBLE UNDERSTANDING SOFT OR WHISPERED SPEECH.    (!) TROUBLE UNDERSTANDING SPEECH ON THE TELEPHONE       Multiple values from one day are sorted in reverse-chronological order         2/12/2025   Driving Risk Screening   Patient/family members have concerns about driving No         2/12/2025   General Alertness/Fatigue Screening   Have you been more tired than usual lately? (!) YES         2/12/2025   Urinary Incontinence Screening   Bothered by leaking urine in past 6 months No            Today's PHQ-2 Score:       2/12/2025    12:03 PM   PHQ-2 ( 1999 Pfizer)   Q1: Little interest or pleasure in doing things 0   Q2: Feeling down, depressed or hopeless 0   PHQ-2 Score 0    Q1: Little interest or pleasure in doing things Not at all   Q2: Feeling down, depressed or hopeless Not at all   PHQ-2 Score 0       Patient-reported           2/12/2025   Substance Use   Alcohol more than 3/day or more than 7/wk No   Do you have a current opioid prescription? No   How severe/bad is pain from 1 to 10? 0/10 (No Pain)   Do you use any other substances recreationally? (!) PRESCRIPTION DRUGS     Social History     Tobacco Use    Smoking status: Light Smoker     Types: Cigarettes    Smokeless tobacco: Never   Vaping Use    Vaping status: Never Used   Substance Use Topics    Alcohol use: Yes     Comment: Alcoholic Drinks/day: rare    Drug  use: No       ASCVD Risk   The ASCVD Risk score (Tammy JORGE, et al., 2019) failed to calculate for the following reasons:    The valid total cholesterol range is 130 to 320 mg/dL            Reviewed and updated as needed this visit by Provider                    Past Medical History:   Diagnosis Date    Diabetes mellitus (H)     Hypertension      No past surgical history on file.  Lab work is in process  Labs reviewed in EPIC  BP Readings from Last 3 Encounters:   02/12/25 120/60   10/01/24 120/70   05/28/24 110/70    Wt Readings from Last 3 Encounters:   02/12/25 92.5 kg (204 lb)   10/01/24 89.8 kg (198 lb)   05/28/24 91.2 kg (201 lb)                  Patient Active Problem List   Diagnosis    Dupuytren's Contracture    Type 2 diabetes mellitus with retinopathy, without long-term current use of insulin, macular edema presence unspecified, unspecified laterality, unspecified retinopathy severity (H)    Hyperlipidemia    Obesity    Essential Hypertension    Tubular adenoma of colon    Cataracts, bilateral    Vitreous floaters of both eyes    Vitamin D deficiency    Asymmetrical sensorineural hearing loss    Stage 3a chronic kidney disease (H)    Hyperbilirubinemia     No past surgical history on file.    Social History     Tobacco Use    Smoking status: Light Smoker     Types: Cigarettes    Smokeless tobacco: Never   Substance Use Topics    Alcohol use: Yes     Comment: Alcoholic Drinks/day: rare     Family History   Problem Relation Age of Onset    Diabetes Mother     Diabetes Father     Diabetes Brother          Current Outpatient Medications   Medication Sig Dispense Refill    aspirin 81 MG EC tablet [ASPIRIN 81 MG EC TABLET] Take 81 mg by mouth daily.      atorvastatin (LIPITOR) 40 MG tablet Take 1 tablet (40 mg) by mouth daily. 90 tablet 1    blood glucose (NO BRAND SPECIFIED) test strip Use to test blood sugar 1 times daily or as directed. 100 strip 3    blood glucose monitoring (NO BRAND SPECIFIED)  meter device kit Use to test blood sugar 1 times daily or as directed. 1 kit 0    blood glucose test (ACCU-CHEK GUIDE TEST STRIPS) strips [BLOOD GLUCOSE TEST (ACCU-CHEK GUIDE TEST STRIPS) STRIPS] Use 1 each As Directed daily. Dispense brand per patient's insurance at pharmacy discretion. 50 strip 3    cholecalciferol, vitamin D3, (VITAMIN D3) 2,000 unit capsule [CHOLECALCIFEROL, VITAMIN D3, (VITAMIN D3) 2,000 UNIT CAPSULE] Take 1,000 Units by mouth daily.      glipiZIDE (GLUCOTROL XL) 10 MG 24 hr tablet TAKE 1 TABLET BY MOUTH TWICE A  tablet 3    lisinopril-hydrochlorothiazide (ZESTORETIC) 20-12.5 MG tablet TAKE 1 TABLET BY MOUTH EVERY DAY 90 tablet 3    metFORMIN (GLUCOPHAGE) 1000 MG tablet TAKE 1 TABLET BY MOUTH TWICE A DAY WITH FOOD 180 tablet 2     No Known Allergies  Recent Labs   Lab Test 02/12/25  1155 10/01/24  1000 05/28/24  0916 01/24/24  0930 05/04/23  0906 12/30/22  1103 08/30/22  0930 04/26/22  0933 12/23/21  1042 08/24/21  0840 04/22/21  1000 04/22/21  0922 12/22/20  0900   A1C 7.3* 7.1* 7.2* 7.1*   < >  --    < > 7.4*  --    < >  --    < >  --    LDL  --   --   --  51  --  53  --   --  53  --  56  --   --    HDL  --   --   --  36*  --  38*  --   --  37*  --  33*  --   --    TRIG  --   --   --  153*  --  131  --   --  145  --  160*  --   --    ALT  --   --   --  30  --  33  --  38 32  --  36  --   --    CR  --   --  1.39* 1.29*   < > 1.38*   < > 1.43* 1.25   < > 1.40*  --  1.30   GFRESTIMATED  --   --  55* 60*   < > 56*   < > 54* 63   < > 51*  --  55*   GFRESTBLACK  --   --   --   --   --   --   --   --   --   --  >60  --  >60   POTASSIUM  --   --  4.8 4.9   < > 4.9   < > 4.5 4.1   < > 4.5  --  4.1    < > = values in this interval not displayed.      Current providers sharing in care for this patient include:  Patient Care Team:  Mark Lin MD as PCP - General  Mark Lin MD as Assigned PCP    The following health maintenance items are reviewed in Epic and correct as of  "today:  Health Maintenance   Topic Date Due    LUNG CANCER SCREENING  Never done    RSV VACCINE (1 - Risk 60-74 years 1-dose series) Never done    DIABETIC FOOT EXAM  12/23/2022    MICROALBUMIN  05/04/2024    INFLUENZA VACCINE (1) Never done    COVID-19 Vaccine (6 - 2024-25 season) 09/01/2024    LIPID  01/24/2025    MEDICARE ANNUAL WELLNESS VISIT  01/24/2025    BMP  05/28/2025    EYE EXAM  07/15/2025    A1C  08/12/2025    NICOTINE/TOBACCO CESSATION COUNSELING Q 1 YR  02/12/2026    ANNUAL REVIEW OF HM ORDERS  02/12/2026    FALL RISK ASSESSMENT  02/12/2026    HEMOGLOBIN  02/12/2026    DTAP/TDAP/TD IMMUNIZATION (3 - Td or Tdap) 07/05/2027    COLORECTAL CANCER SCREENING  09/27/2029    ADVANCE CARE PLANNING  02/12/2030    HEPATITIS C SCREENING  Completed    PHQ-2 (once per calendar year)  Completed    Pneumococcal Vaccine: 50+ Years  Completed    URINALYSIS  Completed    ZOSTER IMMUNIZATION  Completed    AORTIC ANEURYSM SCREENING (SYSTEM ASSIGNED)  Completed    HPV IMMUNIZATION  Aged Out    MENINGITIS IMMUNIZATION  Aged Out         Review of Systems  Constitutional, HEENT, cardiovascular, pulmonary, GI, , musculoskeletal, neuro, skin, endocrine and psych systems are negative, except as otherwise noted.     Objective    Exam  /60   Pulse 95   Temp 97.7  F (36.5  C)   Resp 16   Ht 1.702 m (5' 7\")   Wt 92.5 kg (204 lb)   SpO2 95%   BMI 31.95 kg/m     Estimated body mass index is 31.95 kg/m  as calculated from the following:    Height as of this encounter: 1.702 m (5' 7\").    Weight as of this encounter: 92.5 kg (204 lb).    Physical Exam    GENERAL: alert and no distress.  BMI = 31.95   EYES: Eyes grossly normal to inspection, PERRL and conjunctivae and sclerae normal  HENT: ear canals and TM's normal, nose and mouth without ulcers or lesions  NECK: no adenopathy, no asymmetry, masses, or scars  RESP: lungs clear to auscultation - no rales, rhonchi or wheezes  CV: regular rate and rhythm, normal S1 S2, no S3 " or S4, no murmur, click or rub, no peripheral edema  ABDOMEN: soft, nontender, no hepatosplenomegaly, no masses and bowel sounds normal   (male): normal male genitalia without lesions or urethral discharge, no hernia  RECTAL: normal sphincter tone, no rectal masses, prostate mildly enlarged size, smooth, nontender without nodules or masses  MS: no gross musculoskeletal defects noted, no edema  SKIN: no suspicious lesions or rashes  NEURO: Normal strength and tone, mentation intact and speech normal  PSYCH: mentation appears normal, affect normal/bright        2/12/2025   Mini Cog   Clock Draw Score 2 Normal   3 Item Recall 3 objects recalled   Mini Cog Total Score 5              Signed Electronically by: Mark Lin MD

## 2025-02-12 NOTE — LETTER
February 13, 2025      Clif AMBROSIO Pratik  [address]    [facility] [address]        Dear ,    We are writing to inform you of your test results.    Continue your current diabetes management as discussed in order to further improve.  Goal A1c remains < 7.0% ideally.     Your complete blood count results were normal.  No evidence for anemia, etc.     Your kidney function remains mildly reduced.  It appears stable.    Your liver function tests were normal.     Your cholesterol results are at goal.     Your vitamin D level is normal.  Continue your current management.      Your urine screen was normal.  No evidence for significant protein in your urine.  We will plan to repeat this test on a yearly basis.       Resulted Orders   Hemoglobin A1c   Result Value Ref Range    Estimated Average Glucose 163 (H) <117 mg/dL    Hemoglobin A1C 7.3 (H) 0.0 - 5.6 %      Comment:      Normal <5.7%   Prediabetes 5.7-6.4%    Diabetes 6.5% or higher     Note: Adopted from ADA consensus guidelines.   CBC with platelets   Result Value Ref Range    WBC Count 7.1 4.0 - 11.0 10e3/uL    RBC Count 5.28 4.40 - 5.90 10e6/uL    Hemoglobin 15.5 13.3 - 17.7 g/dL    Hematocrit 46.3 40.0 - 53.0 %    MCV 88 78 - 100 fL    MCH 29.4 26.5 - 33.0 pg    MCHC 33.5 31.5 - 36.5 g/dL    RDW 12.4 10.0 - 15.0 %    Platelet Count 237 150 - 450 10e3/uL   Comprehensive metabolic panel   Result Value Ref Range    Sodium 145 135 - 145 mmol/L    Potassium 4.5 3.4 - 5.3 mmol/L    Carbon Dioxide (CO2) 23 22 - 29 mmol/L    Anion Gap 18 (H) 7 - 15 mmol/L    Urea Nitrogen 24.5 (H) 8.0 - 23.0 mg/dL    Creatinine 1.31 (H) 0.67 - 1.17 mg/dL    GFR Estimate 59 (L) >60 mL/min/1.73m2      Comment:      eGFR calculated using 2021 CKD-EPI equation.    Calcium 9.5 8.8 - 10.4 mg/dL    Chloride 104 98 - 107 mmol/L    Glucose 154 (H) 70 - 99 mg/dL    Alkaline Phosphatase 70 40 - 150 U/L    AST 21 0 - 45 U/L    ALT 24 0 - 70 U/L    Protein Total 6.7 6.4 - 8.3  g/dL    Albumin 4.2 3.5 - 5.2 g/dL    Bilirubin Total 0.8 <=1.2 mg/dL   Lipid panel reflex to direct LDL Fasting   Result Value Ref Range    Cholesterol 115 <200 mg/dL    Triglycerides 118 <150 mg/dL    Direct Measure HDL 40 >=40 mg/dL    LDL Cholesterol Calculated 51 <100 mg/dL    Non HDL Cholesterol 75 <130 mg/dL    Narrative    Cholesterol  Desirable: < 200 mg/dL  Borderline High: 200 - 239 mg/dL  High: >= 240 mg/dL    Triglycerides  Normal: < 150 mg/dL  Borderline High: 150 - 199 mg/dL  High: 200-499 mg/dL  Very High: >= 500 mg/dL    Direct Measure HDL  Female: >= 50 mg/dL   Male: >= 40 mg/dL    LDL Cholesterol  Desirable: < 100 mg/dL  Above Desirable: 100 - 129 mg/dL   Borderline High: 130 - 159 mg/dL   High:  160 - 189 mg/dL   Very High: >= 190 mg/dL    Non HDL Cholesterol  Desirable: < 130 mg/dL  Above Desirable: 130 - 159 mg/dL  Borderline High: 160 - 189 mg/dL  High: 190 - 219 mg/dL  Very High: >= 220 mg/dL   Vitamin D Deficiency   Result Value Ref Range    Vitamin D, Total (25-Hydroxy) 44 20 - 50 ng/mL      Comment:      optimum levels    Narrative    Season, race, dietary intake, and treatment affect the concentration of 25-hydroxy-Vitamin D. Values may decrease during winter months and increase during summer months.    Vitamin D determination is routinely performed by an immunoassay specific for 25 hydroxyvitamin D3.  If an individual is on vitamin D2(ergocalciferol) supplementation, please specify 25 OH vitamin D2 and D3 level determination by LCMSMS test [id number].     Albumin Random Urine Quantitative with Creat Ratio   Result Value Ref Range    Creatinine Urine mg/dL 310.0 mg/dL      Comment:      The reference ranges have not been established in urine creatinine. The results should be integrated into the clinical context for interpretation.    Albumin Urine mg/L 36.3 mg/L      Comment:      The reference ranges have not been established in urine albumin. The results should be integrated into the  clinical context for interpretation.    Albumin Urine mg/g Cr 11.71 0.00 - 17.00 mg/g Cr      Comment:      Microalbuminuria is defined as an albumin:creatinine ratio of 17 to 299 for males and 25 to 299 for females. A ratio of albumin:creatinine of 300 or higher is indicative of overt proteinuria.  Due to biologic variability, positive results should be confirmed by a second, first-morning random or 24-hour timed urine specimen. If there is discrepancy, a third specimen is recommended. When 2 out of 3 results are in the microalbuminuria range, this is evidence for incipient nephropathy and warrants increased efforts at glucose control, blood pressure control, and institution of therapy with an angiotensin-converting-enzyme (ACE) inhibitor (if the patient can tolerate it).         If you have any questions or concerns, please call the clinic at the number listed above.       Sincerely,      Mark Lin MD    Electronically signed

## 2025-02-13 LAB
ALBUMIN SERPL BCG-MCNC: 4.2 G/DL (ref 3.5–5.2)
ALP SERPL-CCNC: 70 U/L (ref 40–150)
ALT SERPL W P-5'-P-CCNC: 24 U/L (ref 0–70)
ANION GAP SERPL CALCULATED.3IONS-SCNC: 18 MMOL/L (ref 7–15)
AST SERPL W P-5'-P-CCNC: 21 U/L (ref 0–45)
BILIRUB SERPL-MCNC: 0.8 MG/DL
BUN SERPL-MCNC: 24.5 MG/DL (ref 8–23)
CALCIUM SERPL-MCNC: 9.5 MG/DL (ref 8.8–10.4)
CHLORIDE SERPL-SCNC: 104 MMOL/L (ref 98–107)
CHOLEST SERPL-MCNC: 115 MG/DL
CREAT SERPL-MCNC: 1.31 MG/DL (ref 0.67–1.17)
CREAT UR-MCNC: 310 MG/DL
EGFRCR SERPLBLD CKD-EPI 2021: 59 ML/MIN/1.73M2
GLUCOSE SERPL-MCNC: 154 MG/DL (ref 70–99)
HCO3 SERPL-SCNC: 23 MMOL/L (ref 22–29)
HDLC SERPL-MCNC: 40 MG/DL
LDLC SERPL CALC-MCNC: 51 MG/DL
MICROALBUMIN UR-MCNC: 36.3 MG/L
MICROALBUMIN/CREAT UR: 11.71 MG/G CR (ref 0–17)
NONHDLC SERPL-MCNC: 75 MG/DL
POTASSIUM SERPL-SCNC: 4.5 MMOL/L (ref 3.4–5.3)
PROT SERPL-MCNC: 6.7 G/DL (ref 6.4–8.3)
SODIUM SERPL-SCNC: 145 MMOL/L (ref 135–145)
TRIGL SERPL-MCNC: 118 MG/DL
VIT D+METAB SERPL-MCNC: 44 NG/ML (ref 20–50)

## 2025-04-08 ENCOUNTER — TRANSFERRED RECORDS (OUTPATIENT)
Dept: HEALTH INFORMATION MANAGEMENT | Facility: CLINIC | Age: 70
End: 2025-04-08
Payer: COMMERCIAL

## 2025-04-14 DIAGNOSIS — I10 ESSENTIAL HYPERTENSION: ICD-10-CM

## 2025-04-14 RX ORDER — LISINOPRIL AND HYDROCHLOROTHIAZIDE 12.5; 2 MG/1; MG/1
TABLET ORAL
Qty: 90 TABLET | Refills: 1 | Status: SHIPPED | OUTPATIENT
Start: 2025-04-14

## 2025-06-12 ENCOUNTER — TRANSFERRED RECORDS (OUTPATIENT)
Dept: HEALTH INFORMATION MANAGEMENT | Facility: CLINIC | Age: 70
End: 2025-06-12
Payer: COMMERCIAL

## 2025-06-20 ENCOUNTER — TRANSFERRED RECORDS (OUTPATIENT)
Dept: HEALTH INFORMATION MANAGEMENT | Facility: CLINIC | Age: 70
End: 2025-06-20
Payer: COMMERCIAL

## 2025-06-26 ENCOUNTER — OFFICE VISIT (OUTPATIENT)
Dept: FAMILY MEDICINE | Facility: CLINIC | Age: 70
End: 2025-06-26
Payer: COMMERCIAL

## 2025-06-26 VITALS
TEMPERATURE: 97.7 F | SYSTOLIC BLOOD PRESSURE: 100 MMHG | HEART RATE: 90 BPM | WEIGHT: 198 LBS | BODY MASS INDEX: 31.08 KG/M2 | HEIGHT: 67 IN | DIASTOLIC BLOOD PRESSURE: 60 MMHG | RESPIRATION RATE: 17 BRPM | OXYGEN SATURATION: 96 %

## 2025-06-26 DIAGNOSIS — N18.31 STAGE 3A CHRONIC KIDNEY DISEASE (H): ICD-10-CM

## 2025-06-26 DIAGNOSIS — I10 ESSENTIAL HYPERTENSION: ICD-10-CM

## 2025-06-26 DIAGNOSIS — R97.20 ELEVATED PROSTATE SPECIFIC ANTIGEN (PSA): ICD-10-CM

## 2025-06-26 DIAGNOSIS — E11.319 TYPE 2 DIABETES MELLITUS WITH RETINOPATHY, WITHOUT LONG-TERM CURRENT USE OF INSULIN, MACULAR EDEMA PRESENCE UNSPECIFIED, UNSPECIFIED LATERALITY, UNSPECIFIED RETINOPATHY SEVERITY (H): Primary | ICD-10-CM

## 2025-06-26 DIAGNOSIS — E78.5 HYPERLIPIDEMIA, UNSPECIFIED HYPERLIPIDEMIA TYPE: ICD-10-CM

## 2025-06-26 LAB
ANION GAP SERPL CALCULATED.3IONS-SCNC: 13 MMOL/L (ref 7–15)
BUN SERPL-MCNC: 26.7 MG/DL (ref 8–23)
CALCIUM SERPL-MCNC: 9.5 MG/DL (ref 8.8–10.4)
CHLORIDE SERPL-SCNC: 104 MMOL/L (ref 98–107)
CREAT SERPL-MCNC: 1.34 MG/DL (ref 0.67–1.17)
CREAT UR-MCNC: 455 MG/DL
EGFRCR SERPLBLD CKD-EPI 2021: 57 ML/MIN/1.73M2
EST. AVERAGE GLUCOSE BLD GHB EST-MCNC: 157 MG/DL
GLUCOSE SERPL-MCNC: 174 MG/DL (ref 70–99)
HBA1C MFR BLD: 7.1 % (ref 0–5.6)
HCO3 SERPL-SCNC: 24 MMOL/L (ref 22–29)
HOLD SPECIMEN: NORMAL
HOLD SPECIMEN: NORMAL
MICROALBUMIN UR-MCNC: 43.1 MG/L
MICROALBUMIN/CREAT UR: 9.47 MG/G CR (ref 0–17)
POTASSIUM SERPL-SCNC: 3.9 MMOL/L (ref 3.4–5.3)
SODIUM SERPL-SCNC: 141 MMOL/L (ref 135–145)

## 2025-06-26 ASSESSMENT — PAIN SCALES - GENERAL: PAINLEVEL_OUTOF10: NO PAIN (0)

## 2025-06-26 NOTE — PROGRESS NOTES
Assessment/Plan:    Type 2 diabetes mellitus with retinopathy, without long-term current use of insulin, macular edema presence unspecified, unspecified laterality, unspecified retinopathy severity (H)  Type 2 diabetes with slight improvement from 7.3% to 7.1% and remains on metformin 1000 mg twice daily and glipizide XL 10 mg daily.  No hypoglycemic episodes.  Will reassess at follow-up office visit in 4 months.  Microalbumin screen updated today.  Has diabetic eye exam next week.  Monofilament testing today was normal.  - Hemoglobin A1c  - Hemoglobin A1c  - Albumin Random Urine Quantitative with Creat Ratio  - Basic metabolic panel  - Albumin Random Urine Quantitative with Creat Ratio  - Basic metabolic panel    Essential hypertension  Lisinopril hydrochlorothiazide 20/12.5 daily.    Stage 3a chronic kidney disease (H)  CKD stage IIIa.  Base metabolic panel for med monitoring.  - Basic metabolic panel  - Basic metabolic panel    Hyperlipidemia, unspecified hyperlipidemia type  Atorvastatin 40 mg daily continuing.    Elevated prostate specific antigen (PSA)  Recent PSA 5.8 as described by urologist last Friday.    The longitudinal plan of care for the diagnosis(es)/condition(s) as documented were addressed during this visit. Due to the added complexity in care, I will continue to support Clif in the subsequent management and with ongoing continuity of care.            Subjective:    Clif Christie is seen today for follow-up assessment.  Type 2 diabetes.  Prior A1c increasing slightly from 7.1% up to 7.3% February 12, 2025.  Has lost 6 pounds since that time and walking more for exercise.  Continues atorvastatin 40 mg daily for lipid management with lipid cascade February 12, 2025 at goal.  Lisinopril hydrochlorothiazide 20/12.5 daily for hypertension management reviewed.  PSA elevation in the past.  Most recent PSA 5.8 last Friday through with urology office and they will continue to monitor.  Patient with  "CKD stage IIIa with prior creatinine 1.31 GFR 59.  Comprehensive review of systems as above otherwise all negative.  Has eye exam scheduled for next week.  Last wellness visit February 12, 2025.      S.O. \"Johanna\" - now resides at [place]  1 daughter \"Sierra\"  2 dogs - Valery Crouch (both are chihuahuas...)  Dad - dec 62 sudden death, DM  Mom - DM  3 younger bros - Kurtis (DM), Billy, and Will  [employer] [facility]  < 1/2 ppd - quit 12/08 (occasional cheat with cigarette...) - quit 3/13/13 again  Infrequent EtOH  No surgeries  No hospitalizations           No past surgical history on file.     Family History   Problem Relation Age of Onset    Diabetes Mother     Diabetes Father     Diabetes Brother         Past Medical History:   Diagnosis Date    Diabetes mellitus (H)     Hypertension         Social History     Tobacco Use    Smoking status: Light Smoker     Types: Cigarettes    Smokeless tobacco: Never   Vaping Use    Vaping status: Never Used   Substance Use Topics    Alcohol use: Yes     Comment: Alcoholic Drinks/day: rare    Drug use: No        Current Outpatient Medications   Medication Sig Dispense Refill    aspirin 81 MG EC tablet [ASPIRIN 81 MG EC TABLET] Take 81 mg by mouth daily.      atorvastatin (LIPITOR) 40 MG tablet Take 1 tablet (40 mg) by mouth daily. 90 tablet 1    blood glucose (NO BRAND SPECIFIED) test strip Use to test blood sugar 1 times daily or as directed. 100 strip 3    blood glucose monitoring (NO BRAND SPECIFIED) meter device kit Use to test blood sugar 1 times daily or as directed. 1 kit 0    blood glucose test (ACCU-CHEK GUIDE TEST STRIPS) strips [BLOOD GLUCOSE TEST (ACCU-CHEK GUIDE TEST STRIPS) STRIPS] Use 1 each As Directed daily. Dispense brand per patient's insurance at pharmacy discretion. 50 strip 3    cholecalciferol, vitamin D3, (VITAMIN D3) 2,000 unit capsule [CHOLECALCIFEROL, VITAMIN D3, (VITAMIN D3) 2,000 UNIT CAPSULE] Take 1,000 Units by mouth " "daily.      glipiZIDE (GLUCOTROL XL) 10 MG 24 hr tablet TAKE 1 TABLET BY MOUTH TWICE A  tablet 3    lisinopril-hydrochlorothiazide (ZESTORETIC) 20-12.5 MG tablet TALE 1 TABLET BY MOUTH EVERY DAY 90 tablet 1    metFORMIN (GLUCOPHAGE) 1000 MG tablet TAKE 1 TABLET BY MOUTH TWICE DAILY WITH FOOD. 180 tablet 1          Objective:    Vitals:    06/26/25 1046   BP: 100/60   Pulse: 90   Resp: 17   Temp: 97.7  F (36.5  C)   SpO2: 96%   Weight: 89.8 kg (198 lb)   Height: 1.708 m (5' 7.25\")      Body mass index is 30.78 kg/m .    Alert.  No apparent distress with chest clear.  Cardiac exam regular.  Monofilament testing was normal.      This note has been dictated using voice recognition software and as a result may contain minor grammatical errors and unintended word substitutions.   Answers submitted by the patient for this visit:  Lipid Visit (Submitted on 6/26/2025)  Chief Complaint: Chronic problems general questions HPI Form  Are you regularly taking any medication or supplement to lower your cholesterol?: Yes  Are you having muscle aches or other side effects that you think could be caused by your cholesterol lowering medication?: No  Hypertension Visit (Submitted on 6/26/2025)  Chief Complaint: Chronic problems general questions HPI Form  Do you check your blood pressure regularly outside of the clinic?: No  Are your blood pressures ever more than 140 on the top number (systolic) OR more than 90 on the bottom number (diastolic)? (For example, greater than 140/90): No  Are you following a low salt diet?: Yes  Diabetes Visit (Submitted on 6/26/2025)  Chief Complaint: Chronic problems general questions HPI Form  Frequency of checking blood sugars:: a few times a month  What time of day are you checking your blood sugars : before and after meals  Have you had any blood sugars above 200?: No  Have you had any blood sugars below 70?: No  Hypoglycemia symptoms:: none  Diabetic concerns:: none  Paraesthesia present:: " none of these symptoms  Have you had a diabetic eye exam within the last year?: Yes  Date of last eye exam: : julyy  Where was this eye exam done?: [facility]  General Questionnaire (Submitted on 6/26/2025)  Chief Complaint: Chronic problems general questions HPI Form  How many servings of fruits and vegetables do you eat daily?: 2-3  On average, how many sweetened beverages do you drink each day (Examples: soda, juice, sweet tea, etc.  Do NOT count diet or artificially sweetened beverages)?: 1  How many minutes a day do you exercise enough to make your heart beat faster?: 9 or less  How many days a week do you exercise enough to make your heart beat faster?: 3 or less  How many days per week do you miss taking your medication?: 0  Questionnaire about: Chronic problems general questions HPI Form (Submitted on 6/26/2025)  Chief Complaint: Chronic problems general questions HPI Form

## 2025-07-22 ENCOUNTER — TRANSFERRED RECORDS (OUTPATIENT)
Dept: HEALTH INFORMATION MANAGEMENT | Facility: CLINIC | Age: 70
End: 2025-07-22
Payer: COMMERCIAL